# Patient Record
Sex: FEMALE | Race: WHITE | NOT HISPANIC OR LATINO | URBAN - METROPOLITAN AREA
[De-identification: names, ages, dates, MRNs, and addresses within clinical notes are randomized per-mention and may not be internally consistent; named-entity substitution may affect disease eponyms.]

---

## 2017-03-29 ENCOUNTER — OFFICE (OUTPATIENT)
Dept: URBAN - METROPOLITAN AREA CLINIC 75 | Facility: CLINIC | Age: 32
End: 2017-03-29

## 2017-03-29 VITALS
HEIGHT: 60 IN | DIASTOLIC BLOOD PRESSURE: 88 MMHG | SYSTOLIC BLOOD PRESSURE: 120 MMHG | WEIGHT: 180 LBS | HEART RATE: 72 BPM

## 2017-03-29 DIAGNOSIS — R11.0 NAUSEA: ICD-10-CM

## 2017-03-29 DIAGNOSIS — R93.3 ABNORMAL FINDINGS ON DIAGNOSTIC IMAGING OF OTHER PARTS OF DI: ICD-10-CM

## 2017-03-29 DIAGNOSIS — R10.13 EPIGASTRIC PAIN: ICD-10-CM

## 2017-03-29 DIAGNOSIS — R14.0 ABDOMINAL DISTENSION (GASEOUS): ICD-10-CM

## 2017-03-29 PROCEDURE — 99214 OFFICE O/P EST MOD 30 MIN: CPT | Performed by: INTERNAL MEDICINE

## 2017-03-29 RX ORDER — NITAZOXANIDE 500 MG/1
TABLET ORAL
Qty: 20 | Refills: 0 | Status: COMPLETED
Start: 2017-03-29 | End: 2017-08-31

## 2017-05-17 VITALS
SYSTOLIC BLOOD PRESSURE: 113 MMHG | RESPIRATION RATE: 23 BRPM | DIASTOLIC BLOOD PRESSURE: 66 MMHG | OXYGEN SATURATION: 100 % | HEART RATE: 98 BPM | HEART RATE: 120 BPM | OXYGEN SATURATION: 99 % | HEART RATE: 95 BPM | WEIGHT: 180 LBS | SYSTOLIC BLOOD PRESSURE: 137 MMHG | SYSTOLIC BLOOD PRESSURE: 128 MMHG | HEIGHT: 60 IN | OXYGEN SATURATION: 98 % | SYSTOLIC BLOOD PRESSURE: 139 MMHG | RESPIRATION RATE: 18 BRPM | DIASTOLIC BLOOD PRESSURE: 82 MMHG | HEART RATE: 96 BPM | DIASTOLIC BLOOD PRESSURE: 69 MMHG | HEART RATE: 104 BPM | DIASTOLIC BLOOD PRESSURE: 86 MMHG | SYSTOLIC BLOOD PRESSURE: 132 MMHG | HEART RATE: 106 BPM | HEART RATE: 86 BPM | HEART RATE: 102 BPM | SYSTOLIC BLOOD PRESSURE: 118 MMHG | HEART RATE: 93 BPM | RESPIRATION RATE: 22 BRPM | OXYGEN SATURATION: 97 % | RESPIRATION RATE: 16 BRPM | DIASTOLIC BLOOD PRESSURE: 87 MMHG | DIASTOLIC BLOOD PRESSURE: 89 MMHG | SYSTOLIC BLOOD PRESSURE: 110 MMHG | TEMPERATURE: 99.2 F | OXYGEN SATURATION: 94 % | HEART RATE: 97 BPM | SYSTOLIC BLOOD PRESSURE: 107 MMHG | DIASTOLIC BLOOD PRESSURE: 76 MMHG | RESPIRATION RATE: 20 BRPM | DIASTOLIC BLOOD PRESSURE: 78 MMHG | RESPIRATION RATE: 24 BRPM | SYSTOLIC BLOOD PRESSURE: 108 MMHG | SYSTOLIC BLOOD PRESSURE: 121 MMHG | TEMPERATURE: 98 F | DIASTOLIC BLOOD PRESSURE: 73 MMHG | SYSTOLIC BLOOD PRESSURE: 120 MMHG

## 2017-05-18 ENCOUNTER — AMBULATORY SURGICAL CENTER (OUTPATIENT)
Dept: URBAN - METROPOLITAN AREA SURGERY 17 | Facility: SURGERY | Age: 32
End: 2017-05-18
Payer: COMMERCIAL

## 2017-05-18 ENCOUNTER — OFFICE (OUTPATIENT)
Dept: URBAN - METROPOLITAN AREA CLINIC 64 | Facility: CLINIC | Age: 32
End: 2017-05-18
Payer: COMMERCIAL

## 2017-05-18 DIAGNOSIS — R14.0 ABDOMINAL DISTENSION (GASEOUS): ICD-10-CM

## 2017-05-18 DIAGNOSIS — R93.3 ABNORMAL FINDINGS ON DIAGNOSTIC IMAGING OF OTHER PARTS OF DI: ICD-10-CM

## 2017-05-18 DIAGNOSIS — R11.0 NAUSEA: ICD-10-CM

## 2017-05-18 DIAGNOSIS — K31.7 POLYP OF STOMACH AND DUODENUM: ICD-10-CM

## 2017-05-18 DIAGNOSIS — K21.0 GASTRO-ESOPHAGEAL REFLUX DISEASE WITH ESOPHAGITIS: ICD-10-CM

## 2017-05-18 DIAGNOSIS — R10.10 UPPER ABDOMINAL PAIN, UNSPECIFIED: ICD-10-CM

## 2017-05-18 DIAGNOSIS — K29.50 UNSPECIFIED CHRONIC GASTRITIS WITHOUT BLEEDING: ICD-10-CM

## 2017-05-18 DIAGNOSIS — R10.13 EPIGASTRIC PAIN: ICD-10-CM

## 2017-05-18 DIAGNOSIS — K29.70 GASTRITIS, UNSPECIFIED, WITHOUT BLEEDING: ICD-10-CM

## 2017-05-18 LAB
GI HISTOLOGY: A. SELECT: (no result)
GI HISTOLOGY: B. SELECT: (no result)
GI HISTOLOGY: C. UNSPECIFIED: (no result)
GI HISTOLOGY: D. UNSPECIFIED: (no result)
GI HISTOLOGY: PDF REPORT: (no result)

## 2017-05-18 PROCEDURE — 88305 TISSUE EXAM BY PATHOLOGIST: CPT | Performed by: INTERNAL MEDICINE

## 2017-05-18 PROCEDURE — 88342 IMHCHEM/IMCYTCHM 1ST ANTB: CPT | Performed by: INTERNAL MEDICINE

## 2017-05-18 PROCEDURE — 45378 DIAGNOSTIC COLONOSCOPY: CPT | Performed by: INTERNAL MEDICINE

## 2017-05-18 PROCEDURE — 43239 EGD BIOPSY SINGLE/MULTIPLE: CPT | Performed by: INTERNAL MEDICINE

## 2017-05-18 RX ADMIN — PROPOFOL 50 MG: 10 INJECTION, EMULSION INTRAVENOUS at 15:03

## 2017-05-18 RX ADMIN — LIDOCAINE HYDROCHLORIDE 25 MG: 10 INJECTION, SOLUTION EPIDURAL; INFILTRATION; INTRACAUDAL; PERINEURAL at 15:00

## 2017-05-18 RX ADMIN — PROPOFOL 50 MG: 10 INJECTION, EMULSION INTRAVENOUS at 15:06

## 2017-05-18 RX ADMIN — PROPOFOL 100 MG: 10 INJECTION, EMULSION INTRAVENOUS at 15:00

## 2017-05-18 RX ADMIN — PROPOFOL 50 MG: 10 INJECTION, EMULSION INTRAVENOUS at 15:07

## 2017-05-18 RX ADMIN — PROPOFOL 50 MG: 10 INJECTION, EMULSION INTRAVENOUS at 15:02

## 2017-05-18 RX ADMIN — PROPOFOL 50 MG: 10 INJECTION, EMULSION INTRAVENOUS at 15:13

## 2017-05-18 RX ADMIN — PROPOFOL 50 MG: 10 INJECTION, EMULSION INTRAVENOUS at 15:10

## 2017-05-18 RX ADMIN — PROPOFOL 50 MG: 10 INJECTION, EMULSION INTRAVENOUS at 15:18

## 2017-05-18 RX ADMIN — PROPOFOL 50 MG: 10 INJECTION, EMULSION INTRAVENOUS at 15:16

## 2017-08-31 ENCOUNTER — OFFICE (OUTPATIENT)
Dept: URBAN - METROPOLITAN AREA CLINIC 75 | Facility: CLINIC | Age: 32
End: 2017-08-31
Payer: COMMERCIAL

## 2017-08-31 VITALS — WEIGHT: 181 LBS | HEIGHT: 60 IN | SYSTOLIC BLOOD PRESSURE: 134 MMHG | DIASTOLIC BLOOD PRESSURE: 84 MMHG

## 2017-08-31 DIAGNOSIS — R93.3 ABNORMAL FINDINGS ON DIAGNOSTIC IMAGING OF OTHER PARTS OF DI: ICD-10-CM

## 2017-08-31 DIAGNOSIS — K58.9 IRRITABLE BOWEL SYNDROME WITHOUT DIARRHEA: ICD-10-CM

## 2017-08-31 DIAGNOSIS — R10.10 UPPER ABDOMINAL PAIN, UNSPECIFIED: ICD-10-CM

## 2017-08-31 PROCEDURE — 99213 OFFICE O/P EST LOW 20 MIN: CPT | Performed by: INTERNAL MEDICINE

## 2017-08-31 RX ORDER — HYOSCYAMINE SULFATE 0.12 MG/1
TABLET ORAL
Qty: 120 | Refills: 6 | Status: ACTIVE
Start: 2017-08-31

## 2018-05-16 ENCOUNTER — OFFICE VISIT (OUTPATIENT)
Dept: CARDIOLOGY | Facility: CLINIC | Age: 33
End: 2018-05-16

## 2018-05-16 VITALS
DIASTOLIC BLOOD PRESSURE: 76 MMHG | HEIGHT: 60 IN | SYSTOLIC BLOOD PRESSURE: 106 MMHG | WEIGHT: 176 LBS | HEART RATE: 82 BPM | BODY MASS INDEX: 34.55 KG/M2

## 2018-05-16 DIAGNOSIS — G90.A POTS (POSTURAL ORTHOSTATIC TACHYCARDIA SYNDROME): ICD-10-CM

## 2018-05-16 DIAGNOSIS — E78.5 HYPERLIPIDEMIA, UNSPECIFIED HYPERLIPIDEMIA TYPE: ICD-10-CM

## 2018-05-16 DIAGNOSIS — I10 ESSENTIAL HYPERTENSION: Primary | ICD-10-CM

## 2018-05-16 PROCEDURE — 93000 ELECTROCARDIOGRAM COMPLETE: CPT | Performed by: INTERNAL MEDICINE

## 2018-05-16 PROCEDURE — 99204 OFFICE O/P NEW MOD 45 MIN: CPT | Performed by: INTERNAL MEDICINE

## 2018-05-16 RX ORDER — AMLODIPINE BESYLATE 2.5 MG/1
2.5 TABLET ORAL DAILY
Qty: 90 TABLET | Refills: 3 | Status: SHIPPED | OUTPATIENT
Start: 2018-05-16 | End: 2018-11-20

## 2018-05-16 RX ORDER — DESONIDE 0.5 MG/ML
LOTION TOPICAL AS NEEDED
COMMUNITY
End: 2019-05-13

## 2018-05-16 RX ORDER — OMEPRAZOLE 20 MG/1
20 CAPSULE, DELAYED RELEASE ORAL AS NEEDED
COMMUNITY
End: 2018-07-09

## 2018-05-16 NOTE — PROGRESS NOTES
Date of Office Visit: 2018  Encounter Provider: Jazz Hardin MD  Place of Service: [facility] CARDIOLOGY  Patient Name: Marycarmen Gauthier  :1985      Patient ID:  Marycarmen Gauthier is a 33 y.o. female is here for          History of Present Illness    She had a tilt table study done 17 which showed inappropriate sinus tachycardia but no evidence of vasovagal syncope.  Tachycardia occurred after nitroglycerin.    She was seen at the [facility] 2018.  She went there for POTS.  In , she had premature rupture of membranes and had her son prematurely at 29 weeks.  He was in the  intensive care unit for 3 months.  After that, she felt like her nerves are shot.  She began having nausea and sweating.  She also started noticing episodes of anxiety, short windedness and tachycardia.  She was having difficulty eating caring for her son because when these episodes of dizziness and tachycardia, on, she has to lie down.  She's checked her blood pressure before when she's had the episodes and she could not even get a blood pressure on her blood pressure monitor.  He does have a history of fibromyalgia, hypermobile joints and posttraumatic stress disorder.  Her autonomic testing done at [facility] showed a normal QSART so no peripheral autonomic neuropathy.  Her tilt table study showed postural tachycardia but stable blood pressure.  They thought she had postural orthostatic tachycardia syndrome and recommended exercise and consideration of beta blockers.  In addition they recommended 3-5 g of sodium a day, to 2.5 L of fluid per day and sleeping with her head raised 6-10 inches as well as compression stockings avoiding alcohol and large meals.    She has had issues with her blood pressure being elevated.  She still has episodes of near syncope with her heart racing.  She particularly notices the heart racing at night.  She's not work since February  2018 due to severe fatigue and recurrent episodes of near syncope.  She has no lower extremity edema.  She does have short windedness activity.  She's trying to work with a  to increase her exercise tolerance.  She is not having chest pain or pressure.  She does have some mild nausea.  Her blood pressure still at times gets high.  She is on atenolol and hydrochlorothiazide currently for that.    She had an echocardiogram done 2017 which was normal.  She sees Dr. Lomas from neurology at [facility] for left arm numbness.    She does have joint hypermobility and sicca syndrome and sees Dr. Crook.  She has fibromyalgia.  She also has endometriosis.    She is  and has one child who was born premature.  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  She has occasional alcohol.  She has one cup of coffee per day.  There is no family history of premature cardiovascular disease.    Past Medical History:   Diagnosis Date   • Anxiety    • Asthma    • Autonomic dysfunction    • Chronic pain    • Chronic sinus infection    • Depression    • Dizziness    • Fatigue    • Fibromyalgia    • Flexor hallucis longus tendinitis 2013   • History of anemia    • Hypermobile joints    • Numbness and tingling in both hands    • Obesity    • Osteoarthritis    • Palpitations    • POTS (postural orthostatic tachycardia syndrome) 2017   • PROM (premature rupture of membranes)    • PTSD (post-traumatic stress disorder) 2014   • Snoring    • SOB (shortness of breath)    • Syncope and collapse          Past Surgical History:   Procedure Laterality Date   • ANKLE SURGERY Right     FHL tendon release   • BREAST SURGERY  10/15/2013    Reduction   • CARPAL TUNNEL RELEASE Right 2017    REVISE MEDIAN N/CARPAL TUNNEL SURG   •  SECTION     • ROTATOR CUFF REPAIR Right    • SINUS SURGERY     • WISDOM TOOTH EXTRACTION         Current Outpatient Prescriptions on File Prior  to Visit   Medication Sig Dispense Refill   • ATENOLOL PO Take 12.5 mg by mouth 2 (Two) Times a Day.     • Citalopram Hydrobromide (CELEXA PO) Take 60 mg by mouth Daily.     • DULoxetine HCl (CYMBALTA PO) Take 60 mg by mouth Daily.     • Fexofenadine HCl (ALLEGRA ALLERGY PO) Take 180 mg by mouth As Needed.     • fluticasone (FLONASE) 50 MCG/ACT nasal spray 2 sprays into each nostril Daily.     • Ibuprofen (ADVIL PO) Take 1 tablet by mouth Daily.     • Pregabalin (LYRICA PO) Take 75 mg by mouth 2 (Two) Times a Day.     • [DISCONTINUED] HYDROCHLOROTHIAZIDE PO Take 25 mg by mouth Daily.       No current facility-administered medications on file prior to visit.        Social History     Social History   • Marital status:      Spouse name: N/A   • Number of children: N/A   • Years of education: N/A     Occupational History   • Not on file.     Social History Main Topics   • Smoking status: Never Smoker   • Smokeless tobacco: Never Used      Comment: CAFFEINE USE   • Alcohol use 0.6 oz/week     1 Standard drinks or equivalent per week      Comment: occ   • Drug use: No   • Sexual activity: Yes     Partners: Male     Birth control/ protection: Condom, None     Other Topics Concern   • Not on file     Social History Narrative   • No narrative on file           Review of Systems   Constitution: Negative.   HENT: Negative for congestion.    Eyes: Negative for vision loss in left eye and vision loss in right eye.   Respiratory: Negative.  Negative for cough, hemoptysis, shortness of breath, sleep disturbances due to breathing, snoring, sputum production and wheezing.    Endocrine: Negative.    Hematologic/Lymphatic: Negative.    Skin: Negative for poor wound healing and rash.   Musculoskeletal: Negative for falls, gout, muscle cramps and myalgias.   Gastrointestinal: Negative for abdominal pain, diarrhea, dysphagia, hematemesis, melena, nausea and vomiting.   Neurological: Negative for excessive daytime sleepiness,  "dizziness, headaches, light-headedness, loss of balance, seizures and vertigo.   Psychiatric/Behavioral: Negative for depression and substance abuse. The patient is not nervous/anxious.        Procedures    ECG 12 Lead  Date/Time: 5/16/2018 12:07 PM  Performed by: THAI GIPSON  Authorized by: THAI GIPSON   Comparison: compared with previous ECG   Similar to previous ECG  Rhythm: sinus rhythm  Clinical impression: normal ECG                Objective:      Vitals:    05/16/18 1107 05/16/18 1111   BP: 110/72 106/76   BP Location: Right arm Left arm   Patient Position: Sitting Sitting   Pulse: 82    Weight: 79.8 kg (176 lb)    Height: 152.4 cm (60\")      Body mass index is 34.37 kg/m².    Physical Exam   Constitutional: She is oriented to person, place, and time. She appears well-developed and well-nourished. No distress.   HENT:   Head: Normocephalic and atraumatic.   Eyes: Conjunctivae are normal. No scleral icterus.   Neck: Neck supple. No JVD present. Carotid bruit is not present. No thyromegaly present.   Cardiovascular: Normal rate, regular rhythm, S1 normal, S2 normal, normal heart sounds and intact distal pulses.   No extrasystoles are present. PMI is not displaced.  Exam reveals no gallop.    No murmur heard.  Pulses:       Carotid pulses are 2+ on the right side, and 2+ on the left side.       Radial pulses are 2+ on the right side, and 2+ on the left side.        Dorsalis pedis pulses are 2+ on the right side, and 2+ on the left side.        Posterior tibial pulses are 2+ on the right side, and 2+ on the left side.   Pulmonary/Chest: Effort normal and breath sounds normal. No respiratory distress. She has no wheezes. She has no rhonchi. She has no rales. She exhibits no tenderness.   Abdominal: Soft. Bowel sounds are normal. She exhibits no distension, no abdominal bruit and no mass. There is no tenderness.   Musculoskeletal: She exhibits no edema or deformity.   Lymphadenopathy:     She has " no cervical adenopathy.   Neurological: She is alert and oriented to person, place, and time. No cranial nerve deficit.   Skin: Skin is warm and dry. No rash noted. She is not diaphoretic. No cyanosis. No pallor. Nails show no clubbing.   Psychiatric: She has a normal mood and affect. Judgment normal.   Vitals reviewed.      Lab Review:       Assessment:      Diagnosis Plan   1. Essential hypertension     2. Hyperlipidemia, unspecified hyperlipidemia type     3. POTS (postural orthostatic tachycardia syndrome)       1. POTS, consider corlanor.  Continue atenolol this time.  Stop HCTZ.  Use recumbent bike, pool therapy and yoga.  Continued to maintain hydration and salt intake.  Use lean protein sources and fresh fruits and vegetables.  Avoid carbohydrates.  Use cooling scars during the hot summer months as she is very sensitive to this.  2. Hypermobile joints.   3. Hypertension, try amlodipine 2.5mg daily.      Plan:       See back in 8 weeks.

## 2018-07-03 ENCOUNTER — OFFICE VISIT (OUTPATIENT)
Dept: CARDIOLOGY | Facility: CLINIC | Age: 33
End: 2018-07-03

## 2018-07-03 VITALS
DIASTOLIC BLOOD PRESSURE: 80 MMHG | BODY MASS INDEX: 33.02 KG/M2 | HEIGHT: 60 IN | SYSTOLIC BLOOD PRESSURE: 110 MMHG | WEIGHT: 168.2 LBS | HEART RATE: 95 BPM

## 2018-07-03 DIAGNOSIS — E78.5 HYPERLIPIDEMIA, UNSPECIFIED HYPERLIPIDEMIA TYPE: ICD-10-CM

## 2018-07-03 DIAGNOSIS — I10 ESSENTIAL HYPERTENSION: ICD-10-CM

## 2018-07-03 DIAGNOSIS — G90.A POTS (POSTURAL ORTHOSTATIC TACHYCARDIA SYNDROME): Primary | ICD-10-CM

## 2018-07-03 PROCEDURE — 99214 OFFICE O/P EST MOD 30 MIN: CPT | Performed by: INTERNAL MEDICINE

## 2018-07-03 PROCEDURE — 93000 ELECTROCARDIOGRAM COMPLETE: CPT | Performed by: INTERNAL MEDICINE

## 2018-07-03 NOTE — PROGRESS NOTES
Date of Office Visit: 2018  Encounter Provider: Jazz Hardin MD  Place of Service: [facility] CARDIOLOGY  Patient Name: Marycarmen Gauthier  :1985      Patient ID:  Marycarmen Gauthier is a 33 y.o. female is here for  followup for POTS.         History of Present Illness    She had a tilt table study done 17 which showed inappropriate sinus tachycardia but no evidence of vasovagal syncope.  Tachycardia occurred after nitroglycerin.     She was seen at the [facility] 2018.  She went there for POTS.  In , she had premature rupture of membranes and had her son prematurely at 29 weeks.  He was in the  intensive care unit for 3 months.  After that, she felt like her nerves are shot.  She began having nausea and sweating.  She also started noticing episodes of anxiety, short windedness and tachycardia.  She was having difficulty eating caring for her son because when these episodes of dizziness and tachycardia, on, she has to lie down.  She's checked her blood pressure before when she's had the episodes and she could not even get a blood pressure on her blood pressure monitor.  He does have a history of fibromyalgia, hypermobile joints and posttraumatic stress disorder.  Her autonomic testing done at [facility] showed a normal QSART so no peripheral autonomic neuropathy.  Her tilt table study showed postural tachycardia but stable blood pressure.  They thought she had postural orthostatic tachycardia syndrome and recommended exercise and consideration of beta blockers.  In addition they recommended 3-5 g of sodium a day, to 2.5 L of fluid per day and sleeping with her head raised 6-10 inches as well as compression stockings avoiding alcohol and large meals.       She had an echocardiogram done 2017 which was normal.  She sees Dr. Lomas from neurology at [facility] for left arm numbness.     She does have joint hypermobility and sicca  syndrome and sees Dr. Crook.  She has fibromyalgia.  She also has endometriosis.     She is  and has one child who was born premature.  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  She has occasional alcohol.  She has one cup of coffee per day.  There is no family history of premature cardiovascular disease.       At her first visit with me, I recommended discontinuation of hydrochlorothiazide.  I recommended exercise, hydration, discontinuation of carbohydrates, cooling scars and salt.  She is doing better with this.  She is not active baseline but her heart rate is better.  She's lost 10 pounds.  She's been exercising.  She's tolerating amlodipine for high blood pressure.    Past Medical History:   Diagnosis Date   • Anxiety    • Asthma    • Autonomic dysfunction    • Chronic pain    • Chronic sinus infection    • Depression    • Dizziness    • Essential hypertension    • Fatigue    • Fibromyalgia    • Flexor hallucis longus tendinitis 2013   • History of anemia    • Hyperlipidemia    • Hypermobile joints    • Numbness and tingling in both hands    • Obesity    • Osteoarthritis    • Palpitations    • POTS (postural orthostatic tachycardia syndrome) 2017   • PROM (premature rupture of membranes)    • PTSD (post-traumatic stress disorder) 2014   • Snoring    • SOB (shortness of breath)    • Syncope and collapse          Past Surgical History:   Procedure Laterality Date   • ANKLE SURGERY Right     FHL tendon release   • BREAST SURGERY  10/15/2013    Reduction   • CARPAL TUNNEL RELEASE Right 2017    REVISE MEDIAN N/CARPAL TUNNEL SURG   •  SECTION     • ROTATOR CUFF REPAIR Right    • SINUS SURGERY     • WISDOM TOOTH EXTRACTION         Current Outpatient Prescriptions on File Prior to Visit   Medication Sig Dispense Refill   • ALBUTEROL SULFATE HFA IN Inhale.     • amLODIPine (NORVASC) 2.5 MG tablet Take 1 tablet by mouth Daily. 90 tablet 3   • ATENOLOL  PO Take 12.5 mg by mouth 2 (Two) Times a Day.     • Citalopram Hydrobromide (CELEXA PO) Take 60 mg by mouth Daily.     • desonide (DESOWEN) 0.05 % lotion Apply  topically As Needed.     • DULoxetine HCl (CYMBALTA PO) Take 60 mg by mouth Daily.     • Fexofenadine HCl (ALLEGRA ALLERGY PO) Take 180 mg by mouth As Needed.     • fluticasone (FLONASE) 50 MCG/ACT nasal spray 2 sprays into each nostril Daily.     • Ibuprofen (ADVIL PO) Take 1 tablet by mouth Daily.     • Multiple Vitamin (MULTI-VITAMIN DAILY PO) Take 1 tablet by mouth Daily.     • omeprazole (priLOSEC) 20 MG capsule Take 20 mg by mouth As Needed.     • Pregabalin (LYRICA PO) Take 75 mg by mouth 2 (Two) Times a Day.     • Probiotic Product (PROBIOTIC PO) Take 1 tablet by mouth Daily.       No current facility-administered medications on file prior to visit.        Social History     Social History   • Marital status:      Spouse name: N/A   • Number of children: N/A   • Years of education: N/A     Occupational History   • Not on file.     Social History Main Topics   • Smoking status: Never Smoker   • Smokeless tobacco: Never Used      Comment: CAFFEINE USE   • Alcohol use 0.6 oz/week     1 Standard drinks or equivalent per week      Comment: occ   • Drug use: No   • Sexual activity: Yes     Partners: Male     Birth control/ protection: Condom, None     Other Topics Concern   • Not on file     Social History Narrative   • No narrative on file           Review of Systems   Constitution: Negative.   HENT: Negative for congestion.    Eyes: Negative for vision loss in left eye and vision loss in right eye.   Respiratory: Negative.  Negative for cough, hemoptysis, shortness of breath, sleep disturbances due to breathing, snoring, sputum production and wheezing.    Endocrine: Negative.    Hematologic/Lymphatic: Negative.    Skin: Negative for poor wound healing and rash.   Musculoskeletal: Negative for falls, gout, muscle cramps and myalgias.  "  Gastrointestinal: Negative for abdominal pain, diarrhea, dysphagia, hematemesis, melena, nausea and vomiting.   Neurological: Negative for excessive daytime sleepiness, dizziness, headaches, light-headedness, loss of balance, seizures and vertigo.   Psychiatric/Behavioral: Negative for depression and substance abuse. The patient is not nervous/anxious.        Procedures    ECG 12 Lead  Date/Time: 7/3/2018 9:51 AM  Performed by: THAI GIPSON  Authorized by: THAI GIPSON   Comparison: compared with previous ECG   Similar to previous ECG  Rhythm: sinus rhythm  Clinical impression: normal ECG                Objective:      Vitals:    07/03/18 0936   BP: 110/80   BP Location: Left arm   Patient Position: Sitting   Pulse: 95   Weight: 76.3 kg (168 lb 3.2 oz)   Height: 152.4 cm (60\")     Body mass index is 32.85 kg/m².    Physical Exam   Constitutional: She is oriented to person, place, and time. She appears well-developed and well-nourished. No distress.   HENT:   Head: Normocephalic and atraumatic.   Eyes: Conjunctivae are normal. No scleral icterus.   Neck: Neck supple. No JVD present. Carotid bruit is not present. No thyromegaly present.   Cardiovascular: Normal rate, regular rhythm, S1 normal, S2 normal, normal heart sounds and intact distal pulses.   No extrasystoles are present. PMI is not displaced.  Exam reveals no gallop.    No murmur heard.  Pulses:       Carotid pulses are 2+ on the right side, and 2+ on the left side.       Radial pulses are 2+ on the right side, and 2+ on the left side.        Dorsalis pedis pulses are 2+ on the right side, and 2+ on the left side.        Posterior tibial pulses are 2+ on the right side, and 2+ on the left side.   Pulmonary/Chest: Effort normal and breath sounds normal. No respiratory distress. She has no wheezes. She has no rhonchi. She has no rales. She exhibits no tenderness.   Abdominal: Soft. Bowel sounds are normal. She exhibits no distension, no " abdominal bruit and no mass. There is no tenderness.   Musculoskeletal: She exhibits no edema or deformity.   Lymphadenopathy:     She has no cervical adenopathy.   Neurological: She is alert and oriented to person, place, and time. No cranial nerve deficit.   Skin: Skin is warm and dry. No rash noted. She is not diaphoretic. No cyanosis. No pallor. Nails show no clubbing.   Psychiatric: She has a normal mood and affect. Judgment normal.   Vitals reviewed.      Lab Review:       Assessment:      Diagnosis Plan   1. POTS (postural orthostatic tachycardia syndrome)     2. Essential hypertension     3. Hyperlipidemia, unspecified hyperlipidemia type          1. POTS,  Stop atenolol this time.  Using recumbent bike, pool therapy and yoga.  Continued to maintain hydration and salt intake.  Use lean protein sources and fresh fruits and vegetables.  Avoid carbohydrates.  Use cooling scars during the hot summer months as she is very sensitive to this.  2. Hypermobile joints.   3. Hypertension,  amlodipine 2.5mg daily. Well controlled.      Plan:       See back in 4 months.

## 2018-07-09 ENCOUNTER — PATIENT MESSAGE (OUTPATIENT)
Dept: INTERNAL MEDICINE | Facility: CLINIC | Age: 33
End: 2018-07-09

## 2018-07-09 ENCOUNTER — OFFICE VISIT (OUTPATIENT)
Dept: INTERNAL MEDICINE | Facility: CLINIC | Age: 33
End: 2018-07-09

## 2018-07-09 VITALS
HEART RATE: 115 BPM | WEIGHT: 167 LBS | HEIGHT: 60 IN | OXYGEN SATURATION: 99 % | SYSTOLIC BLOOD PRESSURE: 118 MMHG | BODY MASS INDEX: 32.79 KG/M2 | DIASTOLIC BLOOD PRESSURE: 78 MMHG

## 2018-07-09 DIAGNOSIS — R53.82 CHRONIC FATIGUE: Chronic | ICD-10-CM

## 2018-07-09 DIAGNOSIS — I10 ESSENTIAL HYPERTENSION: Chronic | ICD-10-CM

## 2018-07-09 DIAGNOSIS — E78.01 FAMILIAL HYPERCHOLESTEROLEMIA: Chronic | ICD-10-CM

## 2018-07-09 DIAGNOSIS — G90.A POTS (POSTURAL ORTHOSTATIC TACHYCARDIA SYNDROME): Primary | Chronic | ICD-10-CM

## 2018-07-09 DIAGNOSIS — M35.7 HYPERMOBILITY SYNDROME: Chronic | ICD-10-CM

## 2018-07-09 PROBLEM — R00.0 TACHYCARDIA: Status: ACTIVE | Noted: 2017-09-07

## 2018-07-09 PROBLEM — R11.0 NAUSEA: Status: ACTIVE | Noted: 2017-01-01

## 2018-07-09 PROBLEM — R06.09 DOE (DYSPNEA ON EXERTION): Status: ACTIVE | Noted: 2017-08-07

## 2018-07-09 PROBLEM — D80.2 IGA DEFICIENCY, SELECTIVE (HCC): Status: ACTIVE | Noted: 2017-03-09

## 2018-07-09 PROBLEM — G62.9 SMALL FIBER NEUROPATHY: Status: ACTIVE | Noted: 2017-02-13

## 2018-07-09 PROBLEM — J30.1 CHRONIC SEASONAL ALLERGIC RHINITIS DUE TO POLLEN: Status: ACTIVE | Noted: 2017-11-16

## 2018-07-09 PROBLEM — M54.50 LBP (LOW BACK PAIN): Status: ACTIVE | Noted: 2018-07-09

## 2018-07-09 PROBLEM — N20.0 NEPHROLITHIASIS: Status: ACTIVE | Noted: 2017-08-07

## 2018-07-09 PROBLEM — R00.2 HEART PALPITATIONS: Status: ACTIVE | Noted: 2017-08-07

## 2018-07-09 PROBLEM — J32.1 CHRONIC FRONTAL SINUSITIS: Status: ACTIVE | Noted: 2017-11-16

## 2018-07-09 PROCEDURE — 99214 OFFICE O/P EST MOD 30 MIN: CPT | Performed by: INTERNAL MEDICINE

## 2018-07-09 RX ORDER — NITROFURANTOIN 25; 75 MG/1; MG/1
CAPSULE ORAL
COMMUNITY
Start: 2018-07-05 | End: 2018-08-27

## 2018-07-09 NOTE — TELEPHONE ENCOUNTER
From: Marycarmen Gauthier  To: Gene Diaz MD  Sent: 7/9/2018 9:19 AM EDT  Subject: Visit Follow-Up Question    Dr. Hardin didn’t complete paperwork all the way and wrote to see notes but she doesn’t reference any limitations or disabilities. Dr. Diaz and I spoke this am he said he would complete for me to help with my appeal to [employer]. Thanks   ----- Message -----  From: Denice PROCTOR  Sent: 7/9/2018 9:13 AM EDT  To: Marycarmen Gauthier  Subject: RE: Visit Follow-Up Question  Marycarmen,    I will talk to Dr. Diaz but Dr. Hardin is involved and completed paperwork as well. It should truly come from her anyway for POTS. Will check with Dr. Diaz.     Thank you,    Whit     ----- Message -----   From: Marycarmen Gauthier   Sent: 7/9/2018 8:45 AM EDT   To: Gene Diaz MD  Subject: Visit Follow-Up Question    Dr. CRAIG,   I have been out on medical leave per my ARNP (Primary Care) since 2/9/18. [employer]/Short term disability has denied my claim based off lack of documentation by a MD “specifically listing my limitations and restrictions.” I am hopeful you can help me with a note listing my limitations/restrictions due to POTS and filling out this form. (2 page form attached)  Thanks for your kindness and help,   Marycarmen   [phone]  [email or web address]

## 2018-07-09 NOTE — TELEPHONE ENCOUNTER
From: Marycarmen Gauthier  To: Gene Diaz MD  Sent: 7/9/2018 8:45 AM EDT  Subject: Visit Follow-Up Question    Dr. CRAIG,   I have been out on medical leave per my ARNP (Primary Care) since 2/9/18. [employer]/Short term disability has denied my claim based off lack of documentation by a MD “specifically listing my limitations and restrictions.” I am hopeful you can help me with a note listing my limitations/restrictions due to POTS and filling out this form. (2 page form attached)  Thanks for your kindness and help,   Marycarmen   [phone]  [email or web address]

## 2018-07-09 NOTE — PROGRESS NOTES
Subjective   Marycarmen Gauthier is a 33 y.o. female.     History of Present Illness     The following portions of the patient's history were reviewed and updated as appropriate: allergies, current medications, past family history, past medical history, past social history, past surgical history and problem list.  34 yo/f with a chief complaint of profound fatigue, myalgias, episodic blood pressure shifts, palpitations, HTN, Hyperlipidemia, depression here for follow up. She has been evaluated at the [facility] and currently is working with her cardiologist and her Rheumatologist. She remains unable to carry on AODL. She has tried multiple theraputic approaches with mixed results. She remains unable to return to work at this time.  Review of Systems   Constitutional: Positive for fatigue.   HENT: Negative.    Eyes: Negative.    Respiratory: Positive for shortness of breath.    Cardiovascular: Positive for palpitations.   Gastrointestinal: Negative.    Endocrine: Negative.    Genitourinary: Negative.    Musculoskeletal: Positive for arthralgias and myalgias.   Skin: Negative.    Allergic/Immunologic: Negative.    Neurological: Negative.    Hematological: Negative.    Psychiatric/Behavioral: Positive for dysphoric mood.       Objective   Physical Exam   Constitutional: She is oriented to person, place, and time. She appears well-developed and well-nourished.   HENT:   Head: Normocephalic and atraumatic.   Eyes: EOM are normal. Pupils are equal, round, and reactive to light.   Neck: Normal range of motion.   Cardiovascular: Normal rate, regular rhythm and normal heart sounds.    Pulmonary/Chest: Effort normal and breath sounds normal.   Abdominal: Soft. Bowel sounds are normal.   Musculoskeletal:   Hyper mobility of her joints   Neurological: She is alert and oriented to person, place, and time.   Skin: Skin is warm and dry.   Psychiatric: She has a normal mood and affect. Her behavior is normal. Judgment and  thought content normal.   Nursing note and vitals reviewed.        Assessment/Plan   Marycarmen was seen today for establish care.    Diagnoses and all orders for this visit:    POTS (postural orthostatic tachycardia syndrome)  Comments:  working with a cardiologist    Essential hypertension  Comments:  well controlled at present    Familial hypercholesterolemia  Comments:  diet and exercise controlled    Hypermobility syndrome  Comments:  currently working with a Rheumatologist    Chronic fatigue  Comments:  cannot function at a normal level for her AODL

## 2018-08-02 ENCOUNTER — TRANSCRIBE ORDERS (OUTPATIENT)
Dept: ADMINISTRATIVE | Facility: HOSPITAL | Age: 33
End: 2018-08-02

## 2018-08-02 DIAGNOSIS — R16.0 LIVER MASS: Primary | ICD-10-CM

## 2018-08-07 ENCOUNTER — TELEPHONE (OUTPATIENT)
Dept: INTERNAL MEDICINE | Facility: CLINIC | Age: 33
End: 2018-08-07

## 2018-08-07 NOTE — TELEPHONE ENCOUNTER
Need patient to come in Thursday over the noon hour (not an appointment), to go over the form with me before I sign off on it. I want to make sure that all the information relayed on the form is correct. If she can't come in on Thursday, I can do a phone conference with her Friday morning.

## 2018-08-07 NOTE — TELEPHONE ENCOUNTER
----- Message from Marjan Magdaleno sent at 8/7/2018 12:51 PM EDT -----  Contact: pt - Dr Diaz's pt - RE: FAMLA & disability forms  Pt calling and would like a return call regarding pt's FMLA & Disability paper work. Could you please call pt to discuss if forms were rec'd and if completed? Pt would like to inform emplyer of status. Please advise Thanks      Pt # [phone]

## 2018-08-08 ENCOUNTER — TELEPHONE (OUTPATIENT)
Dept: INTERNAL MEDICINE | Facility: CLINIC | Age: 33
End: 2018-08-08

## 2018-08-08 NOTE — TELEPHONE ENCOUNTER
Left message informing pt of comments.  Advised pt to contact the office to see which day works best for her.

## 2018-08-08 NOTE — TELEPHONE ENCOUNTER
----- Message from Windy Garcia sent at 8/8/2018  2:22 PM EDT -----  Contact: Pt  Pt would like to do a phone conference call Friday.  She will be available any time and will have her phone on her.      Pt# [phone]

## 2018-08-10 ENCOUNTER — APPOINTMENT (OUTPATIENT)
Dept: MRI IMAGING | Facility: HOSPITAL | Age: 33
End: 2018-08-10
Attending: UROLOGY

## 2018-08-27 ENCOUNTER — OFFICE VISIT (OUTPATIENT)
Dept: INTERNAL MEDICINE | Facility: CLINIC | Age: 33
End: 2018-08-27

## 2018-08-27 VITALS
DIASTOLIC BLOOD PRESSURE: 78 MMHG | WEIGHT: 167 LBS | SYSTOLIC BLOOD PRESSURE: 110 MMHG | HEIGHT: 60 IN | BODY MASS INDEX: 32.79 KG/M2

## 2018-08-27 DIAGNOSIS — R20.0 BILATERAL NUMBNESS AND TINGLING OF ARMS AND LEGS: Primary | ICD-10-CM

## 2018-08-27 DIAGNOSIS — R20.2 BILATERAL NUMBNESS AND TINGLING OF ARMS AND LEGS: Primary | ICD-10-CM

## 2018-08-27 DIAGNOSIS — M54.2 NECK PAIN: ICD-10-CM

## 2018-08-27 DIAGNOSIS — F43.10 PTSD (POST-TRAUMATIC STRESS DISORDER): Chronic | ICD-10-CM

## 2018-08-27 DIAGNOSIS — G90.A POTS (POSTURAL ORTHOSTATIC TACHYCARDIA SYNDROME): ICD-10-CM

## 2018-08-27 PROCEDURE — 99214 OFFICE O/P EST MOD 30 MIN: CPT | Performed by: INTERNAL MEDICINE

## 2018-08-27 RX ORDER — MINOCYCLINE HYDROCHLORIDE 100 MG/1
CAPSULE ORAL
COMMUNITY
Start: 2018-07-26 | End: 2018-11-20

## 2018-08-27 RX ORDER — DULOXETIN HYDROCHLORIDE 60 MG/1
CAPSULE, DELAYED RELEASE ORAL
COMMUNITY
Start: 2018-08-13 | End: 2018-11-20 | Stop reason: SDUPTHER

## 2018-08-27 RX ORDER — COBAMAMIDE 100 %
POWDER (GRAM) MISCELLANEOUS
COMMUNITY
Start: 2018-05-29 | End: 2020-02-24

## 2018-08-27 RX ORDER — OXYCODONE HYDROCHLORIDE AND ACETAMINOPHEN 5; 325 MG/1; MG/1
1 TABLET ORAL EVERY 8 HOURS PRN
Qty: 90 TABLET | Refills: 0 | Status: SHIPPED | OUTPATIENT
Start: 2018-08-27 | End: 2019-06-25

## 2018-08-27 NOTE — PROGRESS NOTES
Subjective   Marycarmen Gauthier is a 33 y.o. female.     History of Present Illness     The following portions of the patient's history were reviewed and updated as appropriate: allergies, current medications, past family history, past medical history, past social history, past surgical history and problem list.  32 yo/f with a chief complaint of worsening neck pain secondary to CDD, bilateral arm numbness, PTSD, POTS here for follow up. She is working with a neurosurgeon and I recommended PT as well. The pain is bad enough to interfere with her daily functioning.  Review of Systems   Constitutional: Positive for fatigue.   HENT: Positive for congestion.    Eyes: Positive for visual disturbance.   Respiratory: Negative.    Cardiovascular: Negative.    Gastrointestinal: Negative.    Endocrine: Positive for cold intolerance.   Genitourinary: Negative.    Musculoskeletal: Positive for arthralgias, back pain and neck pain.   Skin: Negative.    Allergic/Immunologic: Negative.    Neurological: Negative.    Hematological: Negative.    Psychiatric/Behavioral: Negative.        Objective   Physical Exam   Constitutional: She is oriented to person, place, and time. She appears well-developed and well-nourished.   HENT:   Head: Normocephalic and atraumatic.   Eyes: Pupils are equal, round, and reactive to light. EOM are normal.   Neck:   Reduced ROM with a step off at the cervical thoracic juncture   Cardiovascular: Normal rate, regular rhythm and normal heart sounds.    Pulmonary/Chest: Effort normal and breath sounds normal.   Abdominal: Soft. Bowel sounds are normal.   Musculoskeletal:   Chronic neck and back pain   Neurological: She is alert and oriented to person, place, and time.   Skin: Skin is warm and dry.   Psychiatric: She has a normal mood and affect. Her behavior is normal. Judgment and thought content normal.   Nursing note and vitals reviewed.        Assessment/Plan   Marycarmen was seen today for neck  pain.    Diagnoses and all orders for this visit:    Bilateral numbness and tingling of arms and legs  Comments:  has documented cervical disc disease per MRI    PTSD (post-traumatic stress disorder)  Comments:  stable on her current medications    Neck pain  Comments:  return to neurosurgeon  and will treat with percocet 5-325 mg TID  Orders:  -     Ambulatory Referral to Physical Therapy    POTS (postural orthostatic tachycardia syndrome)  Comments:  manageable with lifestyle changes    Other orders  -     oxyCODONE-acetaminophen (PERCOCET) 5-325 MG per tablet; Take 1 tablet by mouth Every 8 (Eight) Hours As Needed for Severe Pain .

## 2018-10-01 ENCOUNTER — TELEPHONE (OUTPATIENT)
Dept: INTERNAL MEDICINE | Facility: CLINIC | Age: 33
End: 2018-10-01

## 2018-11-20 ENCOUNTER — OFFICE VISIT (OUTPATIENT)
Dept: CARDIOLOGY | Facility: CLINIC | Age: 33
End: 2018-11-20

## 2018-11-20 VITALS
DIASTOLIC BLOOD PRESSURE: 78 MMHG | SYSTOLIC BLOOD PRESSURE: 120 MMHG | WEIGHT: 154.6 LBS | BODY MASS INDEX: 30.35 KG/M2 | HEIGHT: 60 IN | HEART RATE: 92 BPM

## 2018-11-20 DIAGNOSIS — I10 ESSENTIAL HYPERTENSION: ICD-10-CM

## 2018-11-20 DIAGNOSIS — R55 SYNCOPE AND COLLAPSE: ICD-10-CM

## 2018-11-20 DIAGNOSIS — G90.A POTS (POSTURAL ORTHOSTATIC TACHYCARDIA SYNDROME): Primary | ICD-10-CM

## 2018-11-20 PROCEDURE — 99214 OFFICE O/P EST MOD 30 MIN: CPT | Performed by: INTERNAL MEDICINE

## 2018-11-20 PROCEDURE — 93000 ELECTROCARDIOGRAM COMPLETE: CPT | Performed by: INTERNAL MEDICINE

## 2018-11-20 RX ORDER — DULOXETIN HYDROCHLORIDE 60 MG/1
1 CAPSULE, DELAYED RELEASE ORAL DAILY
COMMUNITY
Start: 2018-02-26 | End: 2019-05-13

## 2018-11-20 NOTE — PROGRESS NOTES
Date of Office Visit: 2018  Encounter Provider: Jazz Hardin MD  Place of Service: [facility] CARDIOLOGY  Patient Name: Marycarmen Gauthier  :1985      Patient ID:  Marycarmen Gauthier is a 33 y.o. female is here for  followup for         History of Present Illness    She had a tilt table study done 17 which showed inappropriate sinus tachycardia but no evidence of vasovagal syncope.  Tachycardia occurred after nitroglycerin.     She was seen at the [facility] 2018.  She went there for POTS.  In , she had premature rupture of membranes and had her son prematurely at 29 weeks.  He was in the  intensive care unit for 3 months.  After that, she felt like her nerves are shot.  She began having nausea and sweating.  She also started noticing episodes of anxiety, short windedness and tachycardia.  She was having difficulty eating caring for her son because when these episodes of dizziness and tachycardia, on, she has to lie down.  She's checked her blood pressure before when she's had the episodes and she could not even get a blood pressure on her blood pressure monitor.  He does have a history of fibromyalgia, hypermobile joints and posttraumatic stress disorder.  Her autonomic testing done at [facility] showed a normal QSART so no peripheral autonomic neuropathy.  Her tilt table study showed postural tachycardia but stable blood pressure.  They thought she had postural orthostatic tachycardia syndrome and recommended exercise and consideration of beta blockers.  In addition they recommended 3-5 g of sodium a day, to 2.5 L of fluid per day and sleeping with her head raised 6-10 inches as well as compression stockings avoiding alcohol and large meals.        She had an echocardiogram done 2017 which was normal.  She sees Dr. Lomas from neurology at [facility] for left arm numbness.     She does have joint hypermobility and sicca syndrome  and sees Dr. Crook.  She has fibromyalgia.  She also has endometriosis.     She is  and has one child who was born premature.  He is hyperactive.  She is an RN.  She doesn't smoke.  She has occasional alcohol.  She has one cup of coffee per day.  There is no family history of premature cardiovascular disease.    She's been diagnosed with lyme disease.  She is seeing occupational therapy appointment today as well as a physical therapist.  She is doing recumbent bike exercise.  She is not having as much tachycardia.  She's had no syncope.  She has days where she has severe fatigue.  Her blood pressure has been well-controlled.  She had a hysterectomy with Dr. Mason 2018.    Past Medical History:   Diagnosis Date   • Anxiety    • Asthma    • Autonomic dysfunction    • Chronic pain    • Chronic sinus infection    • Depression    • Dizziness    • Essential hypertension    • Fatigue    • Fibromyalgia    • Flexor hallucis longus tendinitis 2013   • History of anemia    • Hyperlipidemia    • Hypermobile joints    • Numbness and tingling in both hands    • Obesity    • Osteoarthritis    • Palpitations    • POTS (postural orthostatic tachycardia syndrome) 2017   • PROM (premature rupture of membranes)    • PTSD (post-traumatic stress disorder) 2014   • Snoring    • SOB (shortness of breath)    • Syncope and collapse          Past Surgical History:   Procedure Laterality Date   • ANKLE SURGERY Right     FHL tendon release   • BREAST SURGERY  10/15/2013    Reduction   • CARPAL TUNNEL RELEASE Right 2017    REVISE MEDIAN N/CARPAL TUNNEL SURG   •  SECTION     • ROTATOR CUFF REPAIR Right    • SINUS SURGERY     • WISDOM TOOTH EXTRACTION         Current Outpatient Medications on File Prior to Visit   Medication Sig Dispense Refill   • 7-Keto DHEA powder      • Acetylcysteine ( PO)      • ALBUTEROL SULFATE HFA IN Inhale.     • amLODIPine (NORVASC) 2.5 MG tablet Take 1  tablet by mouth Daily. 90 tablet 3   • ASHWAGANDHA PO Take 450 mg by mouth Daily.     • Charcoal 260 MG capsule      • Cholecalciferol (VITAMIN D-3) 1000 units capsule Take 1,000 Units by mouth Daily.     • Cobamamide (ADENOSYLCOBALAMIN) powder      • desonide (DESOWEN) 0.05 % lotion Apply  topically As Needed.     • DULoxetine (CYMBALTA) 60 MG capsule Take 1 capsule by mouth Daily.     • Fexofenadine HCl (ALLEGRA ALLERGY PO) Take 180 mg by mouth As Needed.     • Ibuprofen (ADVIL PO) Take 1 tablet by mouth Daily.     • LITHIUM PO Take 5 mg by mouth Daily.     • LYRICA 75 MG capsule      • Multiple Vitamin (MULTI-VITAMIN DAILY PO) Take 1 tablet by mouth Daily.     • Naltrexone HCl powder      • Omega-3 Fatty Acids (OMEGA-3 FISH OIL PO) Take 1 tablet by mouth Daily.     • oxyCODONE-acetaminophen (PERCOCET) 5-325 MG per tablet Take 1 tablet by mouth Every 8 (Eight) Hours As Needed for Severe Pain . 90 tablet 0   • Pregabalin (LYRICA PO) Take 75 mg by mouth 2 (Two) Times a Day.     • Probiotic Product (PROBIOTIC-10 ULTIMATE PO)      • Semaglutide (OZEMPIC) 1 MG/DOSE solution pen-injector      • Unable to find      • Unable to find      • Unable to find      • Unable to find      • Unable to find   1   • [DISCONTINUED] DULoxetine (CYMBALTA) 60 MG capsule      • [DISCONTINUED] DULoxetine HCl (CYMBALTA PO) Take 60 mg by mouth Daily.     • [DISCONTINUED] minocycline (MINOCIN,DYNACIN) 100 MG capsule      • [DISCONTINUED] Minocycline HCl 100 MG reconstituted solution      • [DISCONTINUED] TINIDAZOLE PO        No current facility-administered medications on file prior to visit.        Social History     Socioeconomic History   • Marital status:      Spouse name: Not on file   • Number of children: Not on file   • Years of education: Not on file   • Highest education level: Not on file   Social Needs   • Financial resource strain: Not on file   • Food insecurity - worry: Not on file   • Food insecurity - inability: Not  "on file   • Transportation needs - medical: Not on file   • Transportation needs - non-medical: Not on file   Occupational History   • Not on file   Tobacco Use   • Smoking status: Never Smoker   • Smokeless tobacco: Never Used   • Tobacco comment: CAFFEINE USE   Substance and Sexual Activity   • Alcohol use: Yes     Alcohol/week: 0.6 oz     Types: 1 Standard drinks or equivalent per week     Comment: occ   • Drug use: No   • Sexual activity: Yes     Partners: Male     Birth control/protection: Condom, None   Other Topics Concern   • Not on file   Social History Narrative   • Not on file           Review of Systems   Constitution: Negative.   HENT: Negative for congestion.    Eyes: Negative for vision loss in left eye and vision loss in right eye.   Respiratory: Negative.  Negative for cough, hemoptysis, shortness of breath, sleep disturbances due to breathing, snoring, sputum production and wheezing.    Endocrine: Negative.    Hematologic/Lymphatic: Negative.    Skin: Negative for poor wound healing and rash.   Musculoskeletal: Negative for falls, gout, muscle cramps and myalgias.   Gastrointestinal: Negative for abdominal pain, diarrhea, dysphagia, hematemesis, melena, nausea and vomiting.   Neurological: Negative for excessive daytime sleepiness, dizziness, headaches, light-headedness, loss of balance, seizures and vertigo.   Psychiatric/Behavioral: Negative for depression and substance abuse. The patient is not nervous/anxious.        Procedures    ECG 12 Lead  Date/Time: 11/20/2018 3:39 PM  Performed by: Jazz Hardin MD  Authorized by: Jazz Hardin MD   Comparison: compared with previous ECG   Similar to previous ECG  Rhythm: sinus rhythm  Clinical impression: normal ECG                Objective:      Vitals:    11/20/18 1458   BP: 120/78   BP Location: Right arm   Patient Position: Sitting   Pulse: 92   Weight: 70.1 kg (154 lb 9.6 oz)   Height: 152.4 cm (60\")     Body mass index is 30.19 " kg/m².    Physical Exam   Constitutional: She is oriented to person, place, and time. She appears well-developed and well-nourished. No distress.   HENT:   Head: Normocephalic and atraumatic.   Eyes: Conjunctivae are normal. No scleral icterus.   Neck: Neck supple. No JVD present. Carotid bruit is not present. No thyromegaly present.   Cardiovascular: Normal rate, regular rhythm, S1 normal, S2 normal, normal heart sounds and intact distal pulses.  No extrasystoles are present. PMI is not displaced. Exam reveals no gallop.   No murmur heard.  Pulses:       Carotid pulses are 2+ on the right side, and 2+ on the left side.       Radial pulses are 2+ on the right side, and 2+ on the left side.        Dorsalis pedis pulses are 2+ on the right side, and 2+ on the left side.        Posterior tibial pulses are 2+ on the right side, and 2+ on the left side.   Pulmonary/Chest: Effort normal and breath sounds normal. No respiratory distress. She has no wheezes. She has no rhonchi. She has no rales. She exhibits no tenderness.   Abdominal: Soft. Bowel sounds are normal. She exhibits no distension, no abdominal bruit and no mass. There is no tenderness.   Musculoskeletal: She exhibits no edema or deformity.   Lymphadenopathy:     She has no cervical adenopathy.   Neurological: She is alert and oriented to person, place, and time. No cranial nerve deficit.   Skin: Skin is warm and dry. No rash noted. She is not diaphoretic. No cyanosis. No pallor. Nails show no clubbing.   Psychiatric: She has a normal mood and affect. Judgment normal.   Vitals reviewed.      Lab Review:       Assessment:      Diagnosis Plan   1. POTS (postural orthostatic tachycardia syndrome)     2. Essential hypertension     3. Syncope and collapse       1. POTS,  Stop atenolol this time.  Using recumbent bike, pool therapy and yoga.  Continued to maintain hydration and salt intake.  Use lean protein sources and fresh fruits and vegetables.  Avoid  carbohydrates.  Use cooling scarves during the hot summer months as she is very sensitive to this.  2. Hypermobile joints.   3. Hypertension,  Well controlled.   4. Lyme disease, treated by Dr. Wesley.      Plan:       Stop amlodipine, watch BP at home and f/u with Francy in 6 months.  BP target is <120/80.

## 2018-12-27 ENCOUNTER — OFFICE VISIT (OUTPATIENT)
Dept: INTERNAL MEDICINE | Facility: CLINIC | Age: 33
End: 2018-12-27

## 2018-12-27 VITALS
SYSTOLIC BLOOD PRESSURE: 110 MMHG | WEIGHT: 154 LBS | DIASTOLIC BLOOD PRESSURE: 78 MMHG | HEIGHT: 60 IN | BODY MASS INDEX: 30.23 KG/M2

## 2018-12-27 DIAGNOSIS — D72.829 LEUKOCYTOSIS, UNSPECIFIED TYPE: ICD-10-CM

## 2018-12-27 DIAGNOSIS — Z90.710 H/O ABDOMINAL HYSTERECTOMY: Chronic | ICD-10-CM

## 2018-12-27 DIAGNOSIS — G90.A POTS (POSTURAL ORTHOSTATIC TACHYCARDIA SYNDROME): ICD-10-CM

## 2018-12-27 DIAGNOSIS — I10 ESSENTIAL HYPERTENSION: Primary | Chronic | ICD-10-CM

## 2018-12-27 PROBLEM — M50.30 DDD (DEGENERATIVE DISC DISEASE), CERVICAL: Status: ACTIVE | Noted: 2018-12-17

## 2018-12-27 PROBLEM — M47.812 CERVICAL SPONDYLOSIS: Status: ACTIVE | Noted: 2018-11-16

## 2018-12-27 PROCEDURE — 99214 OFFICE O/P EST MOD 30 MIN: CPT | Performed by: INTERNAL MEDICINE

## 2018-12-27 RX ORDER — PEN NEEDLE, DIABETIC 29 G X1/2"
NEEDLE, DISPOSABLE MISCELLANEOUS
COMMUNITY
Start: 2018-12-26 | End: 2020-02-24

## 2018-12-27 RX ORDER — METRONIDAZOLE 7.5 MG/G
GEL VAGINAL
Refills: 0 | COMMUNITY
Start: 2018-12-11 | End: 2019-05-13

## 2018-12-27 RX ORDER — AMLODIPINE BESYLATE AND ATORVASTATIN CALCIUM 2.5; 1 MG/1; MG/1
1 TABLET, FILM COATED ORAL DAILY
COMMUNITY
End: 2018-12-27

## 2018-12-27 RX ORDER — TINIDAZOLE 250 MG/1
TABLET ORAL DAILY
COMMUNITY
End: 2019-05-13

## 2018-12-27 RX ORDER — MULTIVIT WITH MINERALS/LUTEIN
1000 TABLET ORAL DAILY
COMMUNITY
End: 2021-02-09

## 2018-12-27 NOTE — PROGRESS NOTES
Subjective   Marycarmen Gauthier is a 33 y.o. female. Presents with a chief complaint of POTS, Hypertension, fatigue, s/p hysterectomy with a recent set of labs that showed an elevated WBC of 13.2!    History of Present Illness worsening fatigue and associated temperatures with night sweats    The following portions of the patient's history were reviewed and updated as appropriate: allergies, current medications, past family history, past medical history, past social history, past surgical history and problem list.    Review of Systems   Cardiovascular: Positive for palpitations.   Gastrointestinal: Positive for abdominal pain and nausea.   Musculoskeletal: Positive for back pain and neck pain.   Neurological: Positive for dizziness and light-headedness. Negative for confusion.       Objective   Physical Exam   Constitutional: She is oriented to person, place, and time. She appears well-developed and well-nourished.   HENT:   Head: Normocephalic and atraumatic.   Eyes: EOM are normal. Pupils are equal, round, and reactive to light.   Neck: Normal range of motion. Neck supple.   Cardiovascular: Normal rate, regular rhythm and normal heart sounds.   Pulmonary/Chest: Effort normal and breath sounds normal.   Abdominal: Soft. Bowel sounds are normal.   Genitourinary:   Genitourinary Comments: S/p abdominal hysterectomy   Musculoskeletal:   Diffuse joint and muscle pain   Neurological: She is alert and oriented to person, place, and time.   Skin: Skin is warm and dry.   Psychiatric: She has a normal mood and affect. Her behavior is normal. Judgment and thought content normal.   Nursing note and vitals reviewed.        Assessment/Plan   Diagnoses and all orders for this visit:    Essential hypertension  Comments:  well controlled    POTS (postural orthostatic tachycardia syndrome)  Comments:  stable for now    H/O abdominal hysterectomy  Comments:  doing well post surgery    Leukocytosis, unspecified  type  Comments:  Hematology follow up  Orders:  -     Ambulatory Referral to Hematology

## 2019-01-14 ENCOUNTER — APPOINTMENT (OUTPATIENT)
Dept: MRI IMAGING | Facility: HOSPITAL | Age: 34
End: 2019-01-14
Attending: EMERGENCY MEDICINE

## 2019-01-14 ENCOUNTER — HOSPITAL ENCOUNTER (EMERGENCY)
Facility: HOSPITAL | Age: 34
Discharge: HOME | End: 2019-01-14
Attending: EMERGENCY MEDICINE | Admitting: EMERGENCY MEDICINE

## 2019-01-14 VITALS
DIASTOLIC BLOOD PRESSURE: 83 MMHG | HEIGHT: 60 IN | OXYGEN SATURATION: 97 % | SYSTOLIC BLOOD PRESSURE: 123 MMHG | RESPIRATION RATE: 18 BRPM | WEIGHT: 155 LBS | TEMPERATURE: 98.5 F | BODY MASS INDEX: 30.43 KG/M2 | HEART RATE: 115 BPM

## 2019-01-14 DIAGNOSIS — H53.2 DIPLOPIA: Primary | ICD-10-CM

## 2019-01-14 DIAGNOSIS — R10.30 LOWER ABDOMINAL PAIN: ICD-10-CM

## 2019-01-14 LAB
ALBUMIN SERPL-MCNC: 4.2 G/DL (ref 3.5–5.2)
ALBUMIN/GLOB SERPL: 1.4 G/DL
ALP SERPL-CCNC: 81 U/L (ref 39–117)
ALT SERPL W P-5'-P-CCNC: 17 U/L (ref 1–33)
ANION GAP SERPL CALCULATED.3IONS-SCNC: 10.2 MMOL/L
AST SERPL-CCNC: 18 U/L (ref 1–32)
BASOPHILS # BLD AUTO: 0.03 10*3/MM3 (ref 0–0.2)
BASOPHILS NFR BLD AUTO: 0.4 % (ref 0–1.5)
BILIRUB SERPL-MCNC: 0.2 MG/DL (ref 0.1–1.2)
BILIRUB UR QL STRIP: NEGATIVE
BUN BLD-MCNC: 8 MG/DL (ref 6–20)
BUN/CREAT SERPL: 14.3 (ref 7–25)
CALCIUM SPEC-SCNC: 9.1 MG/DL (ref 8.6–10.5)
CHLORIDE SERPL-SCNC: 102 MMOL/L (ref 98–107)
CLARITY UR: CLEAR
CO2 SERPL-SCNC: 28.8 MMOL/L (ref 22–29)
COLOR UR: YELLOW
CREAT BLD-MCNC: 0.56 MG/DL (ref 0.57–1)
DEPRECATED RDW RBC AUTO: 44.4 FL (ref 37–54)
EOSINOPHIL # BLD AUTO: 0.14 10*3/MM3 (ref 0–0.7)
EOSINOPHIL NFR BLD AUTO: 1.9 % (ref 0.3–6.2)
ERYTHROCYTE [DISTWIDTH] IN BLOOD BY AUTOMATED COUNT: 13.2 % (ref 11.7–13)
GFR SERPL CREATININE-BSD FRML MDRD: 122 ML/MIN/1.73
GLOBULIN UR ELPH-MCNC: 3.1 GM/DL
GLUCOSE BLD-MCNC: 86 MG/DL (ref 65–99)
GLUCOSE UR STRIP-MCNC: NEGATIVE MG/DL
HCT VFR BLD AUTO: 40.5 % (ref 35.6–45.5)
HGB BLD-MCNC: 13.5 G/DL (ref 11.9–15.5)
HGB UR QL STRIP.AUTO: NEGATIVE
HOLD SPECIMEN: NORMAL
HOLD SPECIMEN: NORMAL
IMM GRANULOCYTES # BLD AUTO: 0.03 10*3/MM3 (ref 0–0.03)
IMM GRANULOCYTES NFR BLD AUTO: 0.4 % (ref 0–0.5)
KETONES UR QL STRIP: NEGATIVE
LEUKOCYTE ESTERASE UR QL STRIP.AUTO: NEGATIVE
LYMPHOCYTES # BLD AUTO: 2.81 10*3/MM3 (ref 0.9–4.8)
LYMPHOCYTES NFR BLD AUTO: 38.2 % (ref 19.6–45.3)
MCH RBC QN AUTO: 30.8 PG (ref 26.9–32)
MCHC RBC AUTO-ENTMCNC: 33.3 G/DL (ref 32.4–36.3)
MCV RBC AUTO: 92.5 FL (ref 80.5–98.2)
MONOCYTES # BLD AUTO: 0.54 10*3/MM3 (ref 0.2–1.2)
MONOCYTES NFR BLD AUTO: 7.3 % (ref 5–12)
NEUTROPHILS # BLD AUTO: 3.83 10*3/MM3 (ref 1.9–8.1)
NEUTROPHILS NFR BLD AUTO: 52.2 % (ref 42.7–76)
NITRITE UR QL STRIP: NEGATIVE
NRBC BLD AUTO-RTO: 0 /100 WBC (ref 0–0)
PH UR STRIP.AUTO: 7 [PH] (ref 5–8)
PLATELET # BLD AUTO: 335 10*3/MM3 (ref 140–500)
PMV BLD AUTO: 9.2 FL (ref 6–12)
POTASSIUM BLD-SCNC: 3.7 MMOL/L (ref 3.5–5.2)
PROT SERPL-MCNC: 7.3 G/DL (ref 6–8.5)
PROT UR QL STRIP: NEGATIVE
RBC # BLD AUTO: 4.38 10*6/MM3 (ref 3.9–5.2)
SODIUM BLD-SCNC: 141 MMOL/L (ref 136–145)
SP GR UR STRIP: 1.02 (ref 1–1.03)
UROBILINOGEN UR QL STRIP: NORMAL
WBC NRBC COR # BLD: 7.35 10*3/MM3 (ref 4.5–10.7)
WHOLE BLOOD HOLD SPECIMEN: NORMAL
WHOLE BLOOD HOLD SPECIMEN: NORMAL

## 2019-01-14 PROCEDURE — A9577 INJ MULTIHANCE: HCPCS | Performed by: EMERGENCY MEDICINE

## 2019-01-14 PROCEDURE — 80053 COMPREHEN METABOLIC PANEL: CPT | Performed by: EMERGENCY MEDICINE

## 2019-01-14 PROCEDURE — 70553 MRI BRAIN STEM W/O & W/DYE: CPT

## 2019-01-14 PROCEDURE — 99283 EMERGENCY DEPT VISIT LOW MDM: CPT

## 2019-01-14 PROCEDURE — 0 GADOBENATE DIMEGLUMINE 529 MG/ML SOLUTION: Performed by: EMERGENCY MEDICINE

## 2019-01-14 PROCEDURE — 85025 COMPLETE CBC W/AUTO DIFF WBC: CPT | Performed by: EMERGENCY MEDICINE

## 2019-01-14 PROCEDURE — 81003 URINALYSIS AUTO W/O SCOPE: CPT | Performed by: EMERGENCY MEDICINE

## 2019-01-14 RX ORDER — SODIUM CHLORIDE 0.9 % (FLUSH) 0.9 %
10 SYRINGE (ML) INJECTION AS NEEDED
Status: DISCONTINUED | OUTPATIENT
Start: 2019-01-14 | End: 2019-01-14 | Stop reason: HOSPADM

## 2019-01-14 RX ADMIN — GADOBENATE DIMEGLUMINE 15 ML: 529 INJECTION, SOLUTION INTRAVENOUS at 11:45

## 2019-01-14 NOTE — ED PROVIDER NOTES
EMERGENCY DEPARTMENT ENCOUNTER    CHIEF COMPLAINT  Chief Complaint: Diplopia  History given by: Patient  Room Number: 02/02  PMD: Gene Diaz MD   Neurologist: Dr. Frandy Lomas      HPI:  Pt is a 34 y.o. female with hx of Lyme and autonomic dysfunction with POTS who presents complaining of intermittent horizontal diplopia that began upon waking up this morning. Reports episodes are persistent for a few minutes, self resolve, and then return. Notes similar episode ~1 week ago that lasted ~1 hour and resolved after sleep. Also reports intermittent moderate to severe abd pain and distention x2-3 weeks, and worsening fatigue x1 month. Denies headache. No other complaints at this time.     Duration/Onset/Timing: hours, sudden, intermittent   Location: Bilateral eyes   Radiation: n/a  Quality: n/a  Intensity/Severity: n/a  Associated Symptoms: abd pain, fatigue  Aggravating or Alleviating Factors: none reported   Previous Episodes: Similar symptoms 1 week ago, resolved after sleep, was not evaluated by a provider at that time.       PAST MEDICAL HISTORY  Active Ambulatory Problems     Diagnosis Date Noted   • POTS (postural orthostatic tachycardia syndrome) 05/16/2018   • Essential hypertension 05/16/2018   • Hyperlipidemia 05/16/2018   • Aftercare following left ankle joint replacement surgery 07/30/2015   • Bilateral numbness and tingling of arms and legs 02/03/2016   • Anxiety 12/12/2012   • Carpal tunnel syndrome on both sides 06/06/2016   • Chronic frontal sinusitis 11/16/2017   • Chronic seasonal allergic rhinitis due to pollen 11/16/2017   • Chronic sinusitis 01/01/2006   • VICKERS (dyspnea on exertion) 08/07/2017   • Falls 01/01/2014   • Fatigue 12/18/2014   • Heart palpitations 08/07/2017   • Hypermobile joint syndrome of multiple sites 08/29/2016   • Hypermobility syndrome 01/01/2014   • IgA deficiency, selective (CMS/HCC) 03/09/2017   • LBP (low back pain) 07/09/2018   • Low back pain at multiple sites  2015   • Myalgia 2016   • Nausea 2017   • Nephrolithiasis 2017   • Nystagmus, optokinetic 2016   • Osteoarthritis 2016   • PTSD (post-traumatic stress disorder) 2014   • RAD (reactive airway disease) 2016   • Reflux 2014   • Small fiber neuropathy 2017   • Syncope and collapse 2016   • Tachycardia 2017   • Cervical spondylosis 2018   • DDD (degenerative disc disease), cervical 2018   • H/O abdominal hysterectomy 2018   • Leukocytosis 2018     Resolved Ambulatory Problems     Diagnosis Date Noted   • No Resolved Ambulatory Problems     Past Medical History:   Diagnosis Date   • Anxiety    • Asthma    • Autonomic dysfunction    • Chronic pain    • Chronic sinus infection    • Depression    • Dizziness    • Essential hypertension    • Fatigue    • Fibromyalgia    • Flexor hallucis longus tendinitis 2013   • History of anemia    • Hyperlipidemia    • Hypermobile joints    • Leukocytosis    • Numbness and tingling in both hands    • Obesity    • Osteoarthritis    • Palpitations    • POTS (postural orthostatic tachycardia syndrome) 2017   • PROM (premature rupture of membranes)    • PTSD (post-traumatic stress disorder) 2014   • PTSD (post-traumatic stress disorder)    • Snoring    • SOB (shortness of breath)    • Syncope and collapse        PAST SURGICAL HISTORY  Past Surgical History:   Procedure Laterality Date   • ANKLE SURGERY Right 2014    FHL tendon release   • BREAST SURGERY  10/15/2013    Reduction   • CARPAL TUNNEL RELEASE Right 2017    REVISE MEDIAN N/CARPAL TUNNEL SURG   •  SECTION     • ROTATOR CUFF REPAIR Right    • SINUS SURGERY     • WISDOM TOOTH EXTRACTION         FAMILY HISTORY  Family History   Problem Relation Age of Onset   • Hypertension Mother    • Other Mother         Lipids   • Obesity Mother    • Cancer Father         Non-Hodgkin's Lymphoma   • Obesity Father     • Cancer Sister         Hodgkin's Lymphoma   • Depression Sister    • Obesity Sister    • Colon cancer Maternal Grandmother    • Hypertension Maternal Grandmother    • Stroke Maternal Grandmother    • Heart disease Paternal Grandfather    • Hyperlipidemia Paternal Grandfather    • Sudden death Maternal Grandfather    • Aneurysm Maternal Grandfather    • Clotting disorder Neg Hx    • Diabetes Neg Hx        SOCIAL HISTORY  Social History     Socioeconomic History   • Marital status:      Spouse name: Not on file   • Number of children: 1   • Years of education: College   • Highest education level: Not on file   Social Needs   • Financial resource strain: Not on file   • Food insecurity - worry: Not on file   • Food insecurity - inability: Not on file   • Transportation needs - medical: Not on file   • Transportation needs - non-medical: Not on file   Occupational History   • Occupation: RN     Employer: [employer]   Tobacco Use   • Smoking status: Never Smoker   • Smokeless tobacco: Never Used   • Tobacco comment: CAFFEINE USE   Substance and Sexual Activity   • Alcohol use: Yes     Alcohol/week: 0.6 oz     Types: 1 Standard drinks or equivalent per week     Comment: occasional   • Drug use: No   • Sexual activity: Yes     Partners: Male     Birth control/protection: Condom, None   Other Topics Concern   • Not on file   Social History Narrative   • Not on file       ALLERGIES  Benadryl  [diphenhydramine hcl]; Diphenhydramine; Latex; and Sulfamethoxazole-trimethoprim    REVIEW OF SYSTEMS  Review of Systems   Constitutional: Positive for fatigue. Negative for fever.   HENT: Negative.  Negative for sore throat.    Eyes: Positive for visual disturbance (diplopia).   Respiratory: Negative.  Negative for cough and shortness of breath.    Cardiovascular: Negative.  Negative for chest pain.   Gastrointestinal: Positive for abdominal pain (moderate). Negative for blood in stool and diarrhea.   Genitourinary:  Negative.  Negative for dysuria and hematuria.   Musculoskeletal: Negative.  Negative for back pain.   Skin: Negative.  Negative for rash.   Neurological: Negative.  Negative for headaches.   All other systems reviewed and are negative.      PHYSICAL EXAM  ED Triage Vitals   Temp Heart Rate Resp BP SpO2   01/14/19 0917 01/14/19 0917 01/14/19 0937 01/14/19 0936 01/14/19 0917   98.5 °F (36.9 °C) 115 18 123/83 97 %      Temp src Heart Rate Source Patient Position BP Location FiO2 (%)   01/14/19 0917 01/14/19 0917 -- -- --   Tympanic Monitor          Physical Exam   Constitutional: No distress.   HENT:   Head: Normocephalic and atraumatic.   Mouth/Throat: Oropharynx is clear and moist.   Eyes: Conjunctivae and EOM are normal.   Cardiovascular: Regular rhythm. Tachycardia present.   Pulmonary/Chest: Breath sounds normal. No respiratory distress.   Abdominal: There is no tenderness.   Musculoskeletal: She exhibits no edema or tenderness.   Neurological: She is alert. She has normal motor skills, normal sensation, normal strength and intact cranial nerves. She displays normal speech. No cranial nerve deficit or sensory deficit. She has a normal Finger-Nose-Finger Test and a normal Heel to Oconnell Test.   Skin: No rash noted.   Nursing note and vitals reviewed.      LAB RESULTS  Lab Results (last 24 hours)     Procedure Component Value Units Date/Time    CBC & Differential [[id number]] Collected:  01/14/19 0939    Specimen:  Blood Updated:  01/14/19 1054    Narrative:       The following orders were created for panel order CBC & Differential.  Procedure                               Abnormality         Status                     ---------                               -----------         ------                     CBC Auto Differential[[id number]]        Abnormal            Final result                 Please view results for these tests on the individual orders.    Comprehensive Metabolic Panel [[id number]]  (Abnormal)  Collected:  01/14/19 0939    Specimen:  Blood Updated:  01/14/19 1105     Glucose 86 mg/dL      BUN 8 mg/dL      Creatinine 0.56 mg/dL      Sodium 141 mmol/L      Potassium 3.7 mmol/L      Chloride 102 mmol/L      CO2 28.8 mmol/L      Calcium 9.1 mg/dL      Total Protein 7.3 g/dL      Albumin 4.20 g/dL      ALT (SGPT) 17 U/L      AST (SGOT) 18 U/L      Alkaline Phosphatase 81 U/L      Total Bilirubin 0.2 mg/dL      eGFR Non African Amer 122 mL/min/1.73      Globulin 3.1 gm/dL      A/G Ratio 1.4 g/dL      BUN/Creatinine Ratio 14.3     Anion Gap 10.2 mmol/L     CBC Auto Differential [[id number]]  (Abnormal) Collected:  01/14/19 0939    Specimen:  Blood Updated:  01/14/19 1054     WBC 7.35 10*3/mm3      RBC 4.38 10*6/mm3      Hemoglobin 13.5 g/dL      Hematocrit 40.5 %      MCV 92.5 fL      MCH 30.8 pg      MCHC 33.3 g/dL      RDW 13.2 %      RDW-SD 44.4 fl      MPV 9.2 fL      Platelets 335 10*3/mm3      Neutrophil % 52.2 %      Lymphocyte % 38.2 %      Monocyte % 7.3 %      Eosinophil % 1.9 %      Basophil % 0.4 %      Immature Grans % 0.4 %      Neutrophils, Absolute 3.83 10*3/mm3      Lymphocytes, Absolute 2.81 10*3/mm3      Monocytes, Absolute 0.54 10*3/mm3      Eosinophils, Absolute 0.14 10*3/mm3      Basophils, Absolute 0.03 10*3/mm3      Immature Grans, Absolute 0.03 10*3/mm3      nRBC 0.0 /100 WBC     Urinalysis With Culture If Indicated - Urine, Clean Catch [[id number]]  (Normal) Collected:  01/14/19 0943    Specimen:  Urine, Clean Catch Updated:  01/14/19 1011     Color, UA Yellow     Appearance, UA Clear     pH, UA 7.0     Specific Gravity, UA 1.018     Glucose, UA Negative     Ketones, UA Negative     Bilirubin, UA Negative     Blood, UA Negative     Protein, UA Negative     Leuk Esterase, UA Negative     Nitrite, UA Negative     Urobilinogen, UA 0.2 E.U./dL    Narrative:       Urine microscopic not indicated.          I ordered the above labs and reviewed the results    RADIOLOGY  MRI Brain With & Without  Contrast   Final Result   No acute infarct. No enhancing lesions of brain. No prominent white   matter disease. Follow-up as indications persist.           This report was finalized on 1/14/2019 12:35 PM by Dr. Rafal Landa M.D.               I ordered the above noted radiological studies. Interpreted by radiologist. Reviewed by me in PACS.       PROCEDURES  Procedures      PROGRESS AND CONSULTS       1040: Informed of plan for labs, MRI brain.     1254: MRI and labs reviewed, negative for acute pathology.     1301: Rechecked pt. Pt is resting comfortably. Notified pt of normal results. Discussed the plan to discharge the pt home. I instructed the pt to follow up with PCP, GI, opthomology. Pt understands and agrees with the plan, all questions answered.        MEDICAL DECISION MAKING  Results were reviewed/discussed with the patient and they were also made aware of online access. Pt also made aware that some labs, such as cultures, will not be resulted during ER visit and follow up with PMD is necessary.     MDM       DIAGNOSIS  Final diagnoses:   Diplopia   Lower abdominal pain       DISPOSITION  DISCHARGE    Patient discharged in stable condition.    Reviewed implications of results, diagnosis, meds, responsibility to follow up, warning signs and symptoms of possible worsening, potential complications and reasons to return to ER.    Patient/Family voiced understanding of above instructions.    Discussed plan for discharge, as there is no emergent indication for admission. Patient referred to primary care provider for BP management due to today's BP. Pt/family is agreeable and understands need for follow up and repeat testing.  Pt is aware that discharge does not mean that nothing is wrong but it indicates no emergency is present that requires admission and they must continue care with follow-up as given below or physician of their choice.     FOLLOW-UP  Gene Diaz MD  [facility] [place]  [facility]  [address] [place] [address]  [phone]    In 1 week  If Not Better         Medication List      No changes were made to your prescriptions during this visit.           Latest Documented Vital Signs:  As of 1:02 PM  BP- 123/83 HR- 115 Temp- 98.5 °F (36.9 °C) (Tympanic) O2 sat- 97%    --  Documentation assistance provided by karla Rapp for Dr. Sanz. Information recorded by the scribe was done at my direction and has been verified and validated by me.            Uma Rapp  01/14/19 [id number]       Yfn Sanz MD  01/14/19 [id number]

## 2019-01-16 ENCOUNTER — APPOINTMENT (OUTPATIENT)
Dept: LAB | Facility: HOSPITAL | Age: 34
End: 2019-01-16

## 2019-01-16 ENCOUNTER — CONSULT (OUTPATIENT)
Dept: ONCOLOGY | Facility: CLINIC | Age: 34
End: 2019-01-16

## 2019-01-16 VITALS
RESPIRATION RATE: 16 BRPM | HEART RATE: 100 BPM | BODY MASS INDEX: 30.73 KG/M2 | OXYGEN SATURATION: 100 % | DIASTOLIC BLOOD PRESSURE: 97 MMHG | HEIGHT: 60 IN | WEIGHT: 156.5 LBS | TEMPERATURE: 98.1 F | SYSTOLIC BLOOD PRESSURE: 140 MMHG

## 2019-01-16 DIAGNOSIS — R14.0 ABDOMINAL DISTENSION: Primary | ICD-10-CM

## 2019-01-16 DIAGNOSIS — R06.09 DOE (DYSPNEA ON EXERTION): ICD-10-CM

## 2019-01-16 PROCEDURE — 99245 OFF/OP CONSLTJ NEW/EST HI 55: CPT | Performed by: INTERNAL MEDICINE

## 2019-01-16 PROCEDURE — G0463 HOSPITAL OUTPT CLINIC VISIT: HCPCS | Performed by: INTERNAL MEDICINE

## 2019-01-16 RX ORDER — SENNOSIDES 8.6 MG
650 CAPSULE ORAL AS NEEDED
COMMUNITY

## 2019-01-16 RX ORDER — CYCLOSPORINE 0.5 MG/ML
1 EMULSION OPHTHALMIC 2 TIMES DAILY
COMMUNITY

## 2019-01-16 RX ORDER — BORIC ACID
600 POWDER (GRAM) MISCELLANEOUS
COMMUNITY
End: 2019-06-27

## 2019-01-16 NOTE — PROGRESS NOTES
REFERRING PROVIDER:    Gene Diaz MD  [address] [address]    REASON FOR CONSULTATION:    Leukocytosis    HISTORY OF PRESENT ILLNESS:  Marycarmen Gauthier is a 34 y.o. female who is referred today for further evaluation of leukocytosis.    On  she was found to have a mild leukocytosis with white blood cell count 13.2.  The differential showed 84% neutrophils and 10% lymphocytes.  Hemoglobin and platelets were normal.  She was referred for further evaluation of this.    Her recent history dates to about 6 years ago.  She had a  section and prematurely delivered her son who spent several months in the  intensive care unit.  She developed some PTSD as result of this.  She subsequently has been diagnosed with autonomic dysfunction.  She was seen at the [facility] for evaluation of this.  She was also evaluated by rheumatology there.    She's had numbness in her fingertips for couple of years.  This started with burning in her fingers.  She has seen neurology for this.  She is on medication with improvement in the burning but she still has numbness.  She has chronic shortness of breath and dyspnea on exertion.    More recently over the past 4-6 weeks she has had some worsening fatigue.  She has had some night sweats.  No fevers.    She had a hysterectomy in 2018.  She subsequently had some vaginal infections following this.  After surgery she continues to have some issues with abdominal fullness and early satiety with anorexia.  She is worried that something is going on in her abdomen.  She has occasional abdominal pain but mostly discomfort associated with the bloating.  No urinary symptoms.  She does have some nausea that seems to be associated with the autonomic dysfunction.    She was diagnosed with possible chronic Lyme disease.  She was treated with antibiotics for 3 months with no change in her issues.    There is concern for hematologic  disorder as her father has non-Hodgkin's lymphoma and her sister had Hodgkin's lymphoma diagnosed at age 27.        Past Medical History:   Diagnosis Date   • Anxiety    • Asthma    • Autonomic dysfunction    • Bulging lumbar disc    • Chronic pain    • Chronic sinus infection    • Depression    • Dizziness    • Essential hypertension    • Fatigue    • Fibromyalgia    • Flexor hallucis longus tendinitis 2013   • H/O Bilateral carpal tunnel syndrome    • History of abnormal cervical Pap smear    • History of anemia    • History of tachycardia     POTS   • Hyperlipidemia    • Hypermobile joint syndrome of multiple sites    • Hypermobile joints    • Leukocytosis    • Numbness and tingling in both hands    • Obesity    • Osteoarthritis    • Palpitations    • POTS (postural orthostatic tachycardia syndrome) 2017   • PROM (premature rupture of membranes)    • PTSD (post-traumatic stress disorder) 2014   • PTSD (post-traumatic stress disorder)    • Seasonal allergies    • Snoring    • SOB (shortness of breath)    • Syncope and collapse        Past Surgical History:   Procedure Laterality Date   • ANKLE SURGERY Right 2014    FHL tendon release   • BREAST SURGERY  10/15/2013    Reduction   • CARPAL TUNNEL RELEASE Right 2017    REVISE MEDIAN N/CARPAL TUNNEL SURG   •  SECTION     • ROTATOR CUFF REPAIR Right    • SINUS SURGERY     • WISDOM TOOTH EXTRACTION         SOCIAL HISTORY:   reports that  has never smoked. she has never used smokeless tobacco. She reports that she does not drink alcohol or use drugs.    FAMILY HISTORY:  family history includes Aneurysm in her maternal grandfather; Cancer in her father and sister; Colon cancer in her maternal grandmother; Depression in her sister; Heart disease in her paternal grandfather; Hyperlipidemia in her paternal grandfather; Hypertension in her maternal grandmother and mother; Obesity in her father, mother, and sister; Other in her  mother; Stroke in her maternal grandmother; Sudden death in her maternal grandfather.    ALLERGIES:  Allergies   Allergen Reactions   • Benadryl  [Diphenhydramine Hcl] Other (See Comments)   • Diphenhydramine Other (See Comments)     Agitated, and restless   • Latex Rash   • Sulfamethoxazole-Trimethoprim Itching and Rash       MEDICATIONS:  The medication list has been reviewed with the patient by the medical assistant, and the list has been updated in the electronic medical record, which I reviewed.  Medication dosages and frequencies were confirmed to be accurate.    Review of Systems   Constitutional: Positive for diaphoresis and fatigue. Negative for appetite change, chills, fever and unexpected weight change.   HENT: Negative for congestion, dental problem, facial swelling, hearing loss, mouth sores, nosebleeds, rhinorrhea, sore throat, tinnitus, trouble swallowing and voice change.    Eyes: Positive for visual disturbance.   Respiratory: Positive for cough. Negative for chest tightness, shortness of breath, wheezing and stridor.    Cardiovascular: Positive for chest pain. Negative for palpitations and leg swelling.   Gastrointestinal: Positive for abdominal distention, constipation and nausea. Negative for abdominal pain, blood in stool, diarrhea and vomiting.   Endocrine: Negative.    Genitourinary: Negative for difficulty urinating, dysuria, flank pain, frequency, hematuria, menstrual problem, pelvic pain, vaginal bleeding and vaginal discharge.   Musculoskeletal: Positive for arthralgias and back pain. Negative for joint swelling, myalgias, neck pain and neck stiffness.   Skin: Negative for color change, rash and wound.   Allergic/Immunologic: Negative for environmental allergies.   Neurological: Positive for dizziness, light-headedness and numbness. Negative for seizures, syncope, weakness and headaches.   Hematological: Negative for adenopathy. Does not bruise/bleed easily.   Psychiatric/Behavioral:  "Negative for agitation, confusion and sleep disturbance. The patient is not nervous/anxious.    All other systems reviewed and are negative.      Vitals:    01/16/19 1514   BP: 140/97   Pulse: 100   Resp: 16   Temp: 98.1 °F (36.7 °C)   TempSrc: Oral   SpO2: 100%   Weight: 71 kg (156 lb 8 oz)   Height: 152.4 cm (60\")   PainSc: 0-No pain  Comment: leukocytosis       Physical Exam   Constitutional: She is oriented to person, place, and time. She appears well-developed and well-nourished.   HENT:   Head: Normocephalic and atraumatic.   Nose: Nose normal.   Eyes: Conjunctivae and EOM are normal. Pupils are equal, round, and reactive to light.   Neck: Neck supple.   Cardiovascular: Normal rate, regular rhythm, S1 normal, S2 normal and normal heart sounds. Exam reveals no gallop and no friction rub.   No murmur heard.  Pulmonary/Chest: Effort normal and breath sounds normal. No stridor. No respiratory distress. She has no wheezes. She has no rhonchi. She has no rales. She exhibits no tenderness.   Abdominal: Soft. Bowel sounds are normal. She exhibits no distension and no mass. There is tenderness (mild diffuse). There is no rebound and no guarding.   Musculoskeletal: Normal range of motion. She exhibits no edema.   Lymphadenopathy:     She has no cervical adenopathy.     She has no axillary adenopathy.        Right: No inguinal and no supraclavicular adenopathy present.        Left: No inguinal and no supraclavicular adenopathy present.   Neurological: She is alert and oriented to person, place, and time. No cranial nerve deficit or sensory deficit.   Skin: Skin is warm and dry. No rash noted. No erythema.   Psychiatric: She has a normal mood and affect. Her behavior is normal. Judgment and thought content normal.   Vitals reviewed.      DIAGNOSTIC DATA:  Results for orders placed or performed during the hospital encounter of 01/14/19   Urinalysis With Culture If Indicated - Urine, Clean Catch   Result Value Ref Range    " Color, UA Yellow Yellow, Straw    Appearance, UA Clear Clear    pH, UA 7.0 5.0 - 8.0    Specific Gravity, UA 1.018 1.005 - 1.030    Glucose, UA Negative Negative    Ketones, UA Negative Negative    Bilirubin, UA Negative Negative    Blood, UA Negative Negative    Protein, UA Negative Negative    Leuk Esterase, UA Negative Negative    Nitrite, UA Negative Negative    Urobilinogen, UA 0.2 E.U./dL 0.2 - 1.0 E.U./dL   Comprehensive Metabolic Panel   Result Value Ref Range    Glucose 86 65 - 99 mg/dL    BUN 8 6 - 20 mg/dL    Creatinine 0.56 (L) 0.57 - 1.00 mg/dL    Sodium 141 136 - 145 mmol/L    Potassium 3.7 3.5 - 5.2 mmol/L    Chloride 102 98 - 107 mmol/L    CO2 28.8 22.0 - 29.0 mmol/L    Calcium 9.1 8.6 - 10.5 mg/dL    Total Protein 7.3 6.0 - 8.5 g/dL    Albumin 4.20 3.50 - 5.20 g/dL    ALT (SGPT) 17 1 - 33 U/L    AST (SGOT) 18 1 - 32 U/L    Alkaline Phosphatase 81 39 - 117 U/L    Total Bilirubin 0.2 0.1 - 1.2 mg/dL    eGFR Non African Amer 122 >60 mL/min/1.73    Globulin 3.1 gm/dL    A/G Ratio 1.4 g/dL    BUN/Creatinine Ratio 14.3 7.0 - 25.0    Anion Gap 10.2 mmol/L   CBC Auto Differential   Result Value Ref Range    WBC 7.35 4.50 - 10.70 10*3/mm3    RBC 4.38 3.90 - 5.20 10*6/mm3    Hemoglobin 13.5 11.9 - 15.5 g/dL    Hematocrit 40.5 35.6 - 45.5 %    MCV 92.5 80.5 - 98.2 fL    MCH 30.8 26.9 - 32.0 pg    MCHC 33.3 32.4 - 36.3 g/dL    RDW 13.2 (H) 11.7 - 13.0 %    RDW-SD 44.4 37.0 - 54.0 fl    MPV 9.2 6.0 - 12.0 fL    Platelets 335 140 - 500 10*3/mm3    Neutrophil % 52.2 42.7 - 76.0 %    Lymphocyte % 38.2 19.6 - 45.3 %    Monocyte % 7.3 5.0 - 12.0 %    Eosinophil % 1.9 0.3 - 6.2 %    Basophil % 0.4 0.0 - 1.5 %    Immature Grans % 0.4 0.0 - 0.5 %    Neutrophils, Absolute 3.83 1.90 - 8.10 10*3/mm3    Lymphocytes, Absolute 2.81 0.90 - 4.80 10*3/mm3    Monocytes, Absolute 0.54 0.20 - 1.20 10*3/mm3    Eosinophils, Absolute 0.14 0.00 - 0.70 10*3/mm3    Basophils, Absolute 0.03 0.00 - 0.20 10*3/mm3    Immature Grans, Absolute  0.03 0.00 - 0.03 10*3/mm3    nRBC 0.0 0.0 - 0.0 /100 WBC   Light Blue Top   Result Value Ref Range    Extra Tube hold for add-on    Green Top (Gel)   Result Value Ref Range    Extra Tube Hold for add-ons.    Lavender Top   Result Value Ref Range    Extra Tube hold for add-on    Gold Top - SST   Result Value Ref Range    Extra Tube Hold for add-ons.        IMAGING:    I personally reviewed MRI images of her brain from 1/14/2019.  No infarction.  No lesions.    ASSESSMENT:  This is a 34 y.o. female with:  1.  Multiple medical problems including autonomic dysfunction, possible Lyme disease, postural orthostatic tachycardia syndrome, cervical radiculopathy, PTSD, fibromyalgia, chronic fatigue, history of endometriosis status post hysterectomy in September 2018, SICCA syndrome, and others outlined above.  2.  Recent mild leukocytosis with neutrophilia which has resolved.  Her white blood cell count and differential on 1/14/2019 were normal.  3.  Abdominal distention and discomfort: CT scan of the abdomen and pelvis with contrast  4.  Chest pain and dyspnea on exertion: CT chest with contrast  5.  Family history of lymphoma:  Father with non-Hodgkin lymphoma diagnosed at age 61.  Sr. with a history of Hodgkin lymphoma diagnosed at age 27.  No obvious signs of lymphoma by her history or physical exam at this point.     PLAN:   1.  Her CBC is normal but given her family history and given her symptoms we will go ahead and obtain a CT scan of her chest abdomen and pelvis with contrast within the next couple of weeks.  I will see her back after that with a CBC.

## 2019-01-22 ENCOUNTER — TELEPHONE (OUTPATIENT)
Dept: ONCOLOGY | Facility: CLINIC | Age: 34
End: 2019-01-22

## 2019-01-22 ENCOUNTER — APPOINTMENT (OUTPATIENT)
Dept: ONCOLOGY | Facility: CLINIC | Age: 34
End: 2019-01-22

## 2019-01-22 ENCOUNTER — APPOINTMENT (OUTPATIENT)
Dept: OTHER | Facility: HOSPITAL | Age: 34
End: 2019-01-22

## 2019-01-22 NOTE — TELEPHONE ENCOUNTER
Called [facility] and cancelled CT also called patient and left message that CT had been cancelled and we are waiting to hear from Dr. Carbajal as to how he would like to proceed.  FARHEEN Urban 1/22/19 16:30    ----- Message from Rosei Calix sent at 1/22/2019  4:18 PM EST -----  Regarding: please cancel CT scans that are scheduled tomorrow 01/23/2019  Hello,    Can someone please cancel CT scans that are scheduled tomorrow @ [facility] .    Patients insurance has denied & I am still waiting on an answer from Dr Carbajal, as well on what he would like to do.      Thank you

## 2019-01-23 ENCOUNTER — TELEPHONE (OUTPATIENT)
Dept: ONCOLOGY | Facility: CLINIC | Age: 34
End: 2019-01-23

## 2019-01-23 DIAGNOSIS — R06.09 DOE (DYSPNEA ON EXERTION): Primary | ICD-10-CM

## 2019-01-23 DIAGNOSIS — R14.0 ABDOMINAL DISTENSION: ICD-10-CM

## 2019-01-23 NOTE — TELEPHONE ENCOUNTER
----- Message from Rosie Calix sent at 1/23/2019  9:12 AM EST -----  Regarding: PLEASE SCHEDULE SCANS BEFORE 02/08/19 PATIENTS NEXT APPT WITH DR CARBAJAL   Good Morning,    New orders are being placed per Dr Carbajal. Once Mag Teague enters in epic please schedule scans before patients next appointment with Dr Carbajal on 02/08/2019.    Questions, please let me know  Thank you  Have a blessed day      ----- Message -----  From: Adam Carbajal MD  Sent: 1/23/2019   7:56 AM  To: Rosie Calix, #  Subject: RE: insurance has denied.  wanting xray & US#    Can start with CXR and abd u/s as suggested.  [facility]    ----- Message -----  From: Rosie Calix  Sent: 1/22/2019   3:07 PM  To: Adam Carbajal MD, #  Subject: insurance has denied.  wanting xray & US fir#    Hello,  Can someone please review reason CT SCANS scheduled tomorrow @ [facility] MRI has denied.    Please advise if you agree with chest xray & US AP.  If so please place orders.  IF not then I believe a peer to peer is the next step      [address] CT CHEST; with contrast Denied Based on [employer] Chest Imaging Guidelines, we are unable to approve the requested study. A recent chest x-ray is supported within the last 60 days for the clinical indication(s) presented. The clinical information provided does not describe these results and, therefore, the requested chest imaging is not indicated at this time.    [address] 1 CT ABDOMEN and PELVIS; with contrast Denied Based on [employer] Abdomen Imaging Guidelines, we are unable to approve the requested procedure. Guidelines support a recent ultrasound for the clinical indication(s) presented. Ultrasound may help confirm the diagnosis or may help determine the most appropriate next imaging test. Advanced imaging might be indicated when recent ultrasounds have been performed that are technically limited or non-diagnostic. The clinical information provided does not describe these results and, therefore, the requested imaging is not  indicated at this time.         Thank you

## 2019-01-23 NOTE — PROGRESS NOTES
Orders placed for U/S abd and CXR per VO Dr Carbajal for Dyspnea on exertion and abd distension    Insurance denied CT chest abd pelvis

## 2019-01-30 ENCOUNTER — HOSPITAL ENCOUNTER (OUTPATIENT)
Dept: GENERAL RADIOLOGY | Facility: HOSPITAL | Age: 34
Discharge: HOME | End: 2019-01-30
Attending: INTERNAL MEDICINE

## 2019-01-30 ENCOUNTER — HOSPITAL ENCOUNTER (OUTPATIENT)
Dept: ULTRASOUND IMAGING | Facility: HOSPITAL | Age: 34
Discharge: HOME | End: 2019-01-30
Attending: INTERNAL MEDICINE | Admitting: INTERNAL MEDICINE

## 2019-01-30 DIAGNOSIS — R06.09 DOE (DYSPNEA ON EXERTION): ICD-10-CM

## 2019-01-30 DIAGNOSIS — R14.0 ABDOMINAL DISTENSION: ICD-10-CM

## 2019-01-30 PROCEDURE — 76700 US EXAM ABDOM COMPLETE: CPT

## 2019-01-30 PROCEDURE — 71046 X-RAY EXAM CHEST 2 VIEWS: CPT

## 2019-02-08 ENCOUNTER — OFFICE VISIT (OUTPATIENT)
Dept: ONCOLOGY | Facility: CLINIC | Age: 34
End: 2019-02-08

## 2019-02-08 ENCOUNTER — LAB (OUTPATIENT)
Dept: OTHER | Facility: HOSPITAL | Age: 34
End: 2019-02-08

## 2019-02-08 ENCOUNTER — APPOINTMENT (OUTPATIENT)
Dept: OTHER | Facility: HOSPITAL | Age: 34
End: 2019-02-08

## 2019-02-08 VITALS
OXYGEN SATURATION: 100 % | DIASTOLIC BLOOD PRESSURE: 88 MMHG | HEART RATE: 100 BPM | WEIGHT: 155.9 LBS | BODY MASS INDEX: 30.61 KG/M2 | TEMPERATURE: 98 F | RESPIRATION RATE: 16 BRPM | HEIGHT: 60 IN | SYSTOLIC BLOOD PRESSURE: 133 MMHG

## 2019-02-08 DIAGNOSIS — D72.829 LEUKOCYTOSIS, UNSPECIFIED TYPE: Primary | ICD-10-CM

## 2019-02-08 DIAGNOSIS — R06.09 DOE (DYSPNEA ON EXERTION): ICD-10-CM

## 2019-02-08 LAB
BASOPHILS # BLD AUTO: 0.03 10*3/MM3 (ref 0–0.2)
BASOPHILS NFR BLD AUTO: 0.4 % (ref 0–1.5)
DEPRECATED RDW RBC AUTO: 40.6 FL (ref 37–54)
EOSINOPHIL # BLD AUTO: 0.14 10*3/MM3 (ref 0–0.7)
EOSINOPHIL NFR BLD AUTO: 1.8 % (ref 0.3–6.2)
ERYTHROCYTE [DISTWIDTH] IN BLOOD BY AUTOMATED COUNT: 12.6 % (ref 11.7–13)
HCT VFR BLD AUTO: 37.7 % (ref 35.6–45.5)
HGB BLD-MCNC: 12.9 G/DL (ref 11.9–15.5)
IMM GRANULOCYTES # BLD AUTO: 0.02 10*3/MM3 (ref 0–0.03)
IMM GRANULOCYTES NFR BLD AUTO: 0.3 % (ref 0–0.5)
LYMPHOCYTES # BLD AUTO: 3.26 10*3/MM3 (ref 0.9–4.8)
LYMPHOCYTES NFR BLD AUTO: 41.8 % (ref 19.6–45.3)
MCH RBC QN AUTO: 30.1 PG (ref 26.9–32)
MCHC RBC AUTO-ENTMCNC: 34.2 G/DL (ref 32.4–36.3)
MCV RBC AUTO: 87.9 FL (ref 80.5–98.2)
MONOCYTES # BLD AUTO: 0.59 10*3/MM3 (ref 0.2–1.2)
MONOCYTES NFR BLD AUTO: 7.6 % (ref 5–12)
NEUTROPHILS # BLD AUTO: 3.76 10*3/MM3 (ref 1.9–8.1)
NEUTROPHILS NFR BLD AUTO: 48.1 % (ref 42.7–76)
NRBC BLD AUTO-RTO: 0 /100 WBC (ref 0–0)
PLATELET # BLD AUTO: 305 10*3/MM3 (ref 140–500)
PMV BLD AUTO: 9 FL (ref 6–12)
RBC # BLD AUTO: 4.29 10*6/MM3 (ref 3.9–5.2)
WBC NRBC COR # BLD: 7.8 10*3/MM3 (ref 4.5–10.7)

## 2019-02-08 PROCEDURE — 99214 OFFICE O/P EST MOD 30 MIN: CPT | Performed by: INTERNAL MEDICINE

## 2019-02-08 PROCEDURE — 85025 COMPLETE CBC W/AUTO DIFF WBC: CPT | Performed by: INTERNAL MEDICINE

## 2019-02-08 RX ORDER — VALACYCLOVIR HYDROCHLORIDE 1 G/1
TABLET, FILM COATED ORAL
COMMUNITY
Start: 2019-02-07 | End: 2019-05-13 | Stop reason: SDUPTHER

## 2019-02-08 NOTE — PROGRESS NOTES
[facility] GROUP OUTPATIENT FOLLOW UP CLINIC VISIT    REASON FOR FOLLOW-UP:    Leukocytosis    HISTORY OF PRESENT ILLNESS:  Marycarmen Gauthier is a 34 y.o. female who returns today for follow up of the above issue.      She was seen initially on 2019.  At that time we plan for CT imaging of her chest abdomen and pelvis.  However, only a chest x-ray and abdominal ultrasound were approved by her insurance.    She continues to feel about the same no new problems today.  Please see her history below.        HEMATOLOGIC HISTORY:  On  she was found to have a mild leukocytosis with white blood cell count 13.2.  The differential showed 84% neutrophils and 10% lymphocytes.  Hemoglobin and platelets were normal.  She was referred for further evaluation of this.     Her recent history dates to about 6 years ago.  She had a  section and prematurely delivered her son who spent several months in the  intensive care unit.  She developed some PTSD as result of this.  She subsequently has been diagnosed with autonomic dysfunction.  She was seen at the [facility] for evaluation of this.  She was also evaluated by rheumatology there.     She's had numbness in her fingertips for couple of years.  This started with burning in her fingers.  She has seen neurology for this.  She is on medication with improvement in the burning but she still has numbness.  She has chronic shortness of breath and dyspnea on exertion.     More recently over the past 4-6 weeks she has had some worsening fatigue.  She has had some night sweats.  No fevers.     She had a hysterectomy in 2018.  She subsequently had some vaginal infections following this.  After surgery she continues to have some issues with abdominal fullness and early satiety with anorexia.  She is worried that something is going on in her abdomen.  She has occasional abdominal pain but mostly discomfort associated with the bloating.  No  urinary symptoms.  She does have some nausea that seems to be associated with the autonomic dysfunction.     She was diagnosed with possible chronic Lyme disease.  She was treated with antibiotics for 3 months with no change in her issues.     There is concern for hematologic disorder as her father has non-Hodgkin's lymphoma and her sister had Hodgkin's lymphoma diagnosed at age 27.            ALLERGIES:  Allergies   Allergen Reactions   • Benadryl  [Diphenhydramine Hcl] Other (See Comments)   • Diphenhydramine Other (See Comments)     Agitated, and restless   • Latex Rash   • Sulfamethoxazole-Trimethoprim Itching and Rash       MEDICATIONS:  The medication list has been reviewed with the patient by the medical assistant, and the list has been updated in the electronic medical record, which I reviewed.  Medication dosages and frequencies were confirmed to be accurate.    REVIEW OF SYSTEMS:  Constitutional: Positive for diaphoresis and fatigue. Negative for appetite change, chills, fever and unexpected weight change.   HENT: Negative for congestion, dental problem, facial swelling, hearing loss, mouth sores, nosebleeds, rhinorrhea, sore throat, tinnitus, trouble swallowing and voice change.    Eyes: Positive for visual disturbance.   Respiratory: Positive for cough. Negative for chest tightness, shortness of breath, wheezing and stridor.    Cardiovascular: Positive for chest pain. Negative for palpitations and leg swelling.   Gastrointestinal: Positive for abdominal distention, constipation and nausea. Negative for abdominal pain, blood in stool, diarrhea and vomiting.   Endocrine: Negative.    Genitourinary: Negative for difficulty urinating, dysuria, flank pain, frequency, hematuria, menstrual problem, pelvic pain, vaginal bleeding and vaginal discharge.   Musculoskeletal: Positive for arthralgias and back pain. Negative for joint swelling, myalgias, neck pain and neck stiffness.   Skin: Negative for color change,  "rash and wound.   Allergic/Immunologic: Negative for environmental allergies.   Neurological: Positive for dizziness, light-headedness and numbness. Negative for seizures, syncope, weakness and headaches.   Hematological: Negative for adenopathy. Does not bruise/bleed easily.   Psychiatric/Behavioral: Negative for agitation, confusion and sleep disturbance. The patient is not nervous/anxious.    All other systems reviewed and are negative.          Vitals:    02/08/19 1410   BP: 133/88   Pulse: 100   Resp: 16   Temp: 98 °F (36.7 °C)   TempSrc: Oral   SpO2: 100%   Weight: 70.7 kg (155 lb 14.4 oz)   Height: 152.4 cm (60\")   PainSc: 0-No pain       PHYSICAL EXAMINATION:  GENERAL:  Well-developed well-nourished female; awake, alert and oriented, in no acute distress.  SKIN:  Warm and dry, without rashes, purpura, or petechiae.  HEAD:  Normocephalic, atraumatic.  EYES:  Pupils equal, round and reactive to light.  Extraocular movements intact.  Conjunctivae normal.  EARS:  Hearing intact.  NOSE:  Septum midline.  No excoriations or nasal discharge.  MOUTH:  No stomatitis or ulcers.  Lips are normal.  THROAT:  Oropharynx without lesions or exudates.  NECK:  Supple with good range of motion; no thyromegaly or masses; no JVD or bruits.  LYMPHATICS:  No cervical, supraclavicular, or axillary lymphadenopathy.  CHEST:  Lungs are clear to auscultation bilaterally.  No wheezes, rales, or rhonchi.  HEART:  Regular rate; normal rhythm.  No murmurs, gallops or rubs.  ABDOMEN:  Soft, non-tender, non-distended.  Normal active bowel sounds.  No organomegaly.  EXTREMITIES:  No clubbing cyanosis or edema.  NEUROLOGICAL:  No focal neurologic deficits.    DIAGNOSTIC DATA:  Results for orders placed or performed in visit on 02/08/19   CBC Auto Differential   Result Value Ref Range    WBC 7.80 4.50 - 10.70 10*3/mm3    RBC 4.29 3.90 - 5.20 10*6/mm3    Hemoglobin 12.9 11.9 - 15.5 g/dL    Hematocrit 37.7 35.6 - 45.5 %    MCV 87.9 80.5 - 98.2 fL "    MCH 30.1 26.9 - 32.0 pg    MCHC 34.2 32.4 - 36.3 g/dL    RDW 12.6 11.7 - 13.0 %    RDW-SD 40.6 37.0 - 54.0 fl    MPV 9.0 6.0 - 12.0 fL    Platelets 305 140 - 500 10*3/mm3    Neutrophil % 48.1 42.7 - 76.0 %    Lymphocyte % 41.8 19.6 - 45.3 %    Monocyte % 7.6 5.0 - 12.0 %    Eosinophil % 1.8 0.3 - 6.2 %    Basophil % 0.4 0.0 - 1.5 %    Immature Grans % 0.3 0.0 - 0.5 %    Neutrophils, Absolute 3.76 1.90 - 8.10 10*3/mm3    Lymphocytes, Absolute 3.26 0.90 - 4.80 10*3/mm3    Monocytes, Absolute 0.59 0.20 - 1.20 10*3/mm3    Eosinophils, Absolute 0.14 0.00 - 0.70 10*3/mm3    Basophils, Absolute 0.03 0.00 - 0.20 10*3/mm3    Immature Grans, Absolute 0.02 0.00 - 0.03 10*3/mm3    nRBC 0.0 0.0 - 0.0 /100 WBC       IMAGING:  Chest x-ray images 1/30/2019 images personally reviewed.  No acute cardiopulmonary findings.    Ultrasound abdomen 1/30/2019  1. No abnormalities demonstrated in the gallbladder.  2. There are 2 echogenic lesions in the liver which may represent  hemangiomas. Suggest correlation to any prior outside CT scans of the  abdomen or abdominal ultrasound studies. If none are available  short-term followup abdominal ultrasound or a hemangioma protocol CT of  the abdomen with contrast recommended.      ASSESSMENT:  This is a 34 y.o. female with:  1. Multiple medical problems including autonomic dysfunction, possible Lyme disease, postural orthostatic tachycardia syndrome, cervical radiculopathy, PTSD, fibromyalgia, chronic fatigue, history of endometriosis status post hysterectomy in September 2018, SICCA syndrome, and others outlined above.  2.  Recent mild leukocytosis with neutrophilia which resolved.  Her white blood cell count remains normal today.  3.  Abdominal distention and discomfort: Abdominal ultrasound unremarkable aside from too small liver lesions.  She had an MRI of her abdomen done last year in August that confirmed two hemangiomas but there were no other acute findings.  I don't think any further  evaluation is necessary.  4.  History of chest pain and dyspnea on exertion: No chest x-ray was normal.  We will not pursue CT imaging.  5.  Family history of lymphoma: Father with non-Hodgkin lymphoma diagnosed at age 61 and a sister with a history of Hodgkin lymphoma diagnosed at age 27.  She has no specific signs or symptoms of lymphoma.    PLAN:  1.  At this point no further imaging is planned and because her CBC is normal I can see her back as needed.  She will follow-up with her other physicians otherwise.

## 2019-05-13 ENCOUNTER — OFFICE VISIT (OUTPATIENT)
Dept: INTERNAL MEDICINE | Facility: CLINIC | Age: 34
End: 2019-05-13

## 2019-05-13 VITALS
WEIGHT: 186 LBS | DIASTOLIC BLOOD PRESSURE: 70 MMHG | HEIGHT: 60 IN | TEMPERATURE: 98.4 F | BODY MASS INDEX: 36.52 KG/M2 | OXYGEN SATURATION: 99 % | SYSTOLIC BLOOD PRESSURE: 110 MMHG | HEART RATE: 121 BPM

## 2019-05-13 DIAGNOSIS — R11.2 NON-INTRACTABLE VOMITING WITH NAUSEA, UNSPECIFIED VOMITING TYPE: Primary | ICD-10-CM

## 2019-05-13 DIAGNOSIS — R51.9 NONINTRACTABLE HEADACHE, UNSPECIFIED CHRONICITY PATTERN, UNSPECIFIED HEADACHE TYPE: ICD-10-CM

## 2019-05-13 DIAGNOSIS — R10.10 PAIN OF UPPER ABDOMEN: ICD-10-CM

## 2019-05-13 DIAGNOSIS — R19.7 DIARRHEA, UNSPECIFIED TYPE: ICD-10-CM

## 2019-05-13 LAB
ALBUMIN SERPL-MCNC: 4.1 G/DL (ref 3.5–5.2)
ALBUMIN/GLOB SERPL: 1.2 G/DL
ALP SERPL-CCNC: 100 U/L (ref 39–117)
ALT SERPL W P-5'-P-CCNC: 12 U/L (ref 1–33)
ANION GAP SERPL CALCULATED.3IONS-SCNC: 10.4 MMOL/L
AST SERPL-CCNC: 15 U/L (ref 1–32)
BASOPHILS # BLD AUTO: 0.01 10*3/MM3 (ref 0–0.2)
BASOPHILS NFR BLD AUTO: 0.1 % (ref 0–1.5)
BILIRUB SERPL-MCNC: 0.5 MG/DL (ref 0.2–1.2)
BILIRUB UR QL STRIP: NEGATIVE
BUN BLD-MCNC: 9 MG/DL (ref 6–20)
BUN/CREAT SERPL: 12.7 (ref 7–25)
CALCIUM SPEC-SCNC: 9.4 MG/DL (ref 8.6–10.5)
CHLORIDE SERPL-SCNC: 102 MMOL/L (ref 98–107)
CLARITY UR: ABNORMAL
CO2 SERPL-SCNC: 25.6 MMOL/L (ref 22–29)
COLOR UR: YELLOW
CREAT BLD-MCNC: 0.71 MG/DL (ref 0.57–1)
DEPRECATED RDW RBC AUTO: 43.3 FL (ref 37–54)
EOSINOPHIL # BLD AUTO: 0.33 10*3/MM3 (ref 0–0.4)
EOSINOPHIL NFR BLD AUTO: 2.2 % (ref 0.3–6.2)
ERYTHROCYTE [DISTWIDTH] IN BLOOD BY AUTOMATED COUNT: 12.8 % (ref 12.3–15.4)
GFR SERPL CREATININE-BSD FRML MDRD: 93 ML/MIN/1.73
GLOBULIN UR ELPH-MCNC: 3.3 GM/DL
GLUCOSE BLD-MCNC: 91 MG/DL (ref 65–99)
GLUCOSE UR STRIP-MCNC: NEGATIVE MG/DL
HCT VFR BLD AUTO: 41.8 % (ref 34–46.6)
HGB BLD-MCNC: 14 G/DL (ref 12–15.9)
HGB UR QL STRIP.AUTO: NEGATIVE
KETONES UR QL STRIP: ABNORMAL
LEUKOCYTE ESTERASE UR QL STRIP.AUTO: NEGATIVE
LYMPHOCYTES # BLD AUTO: 2.04 10*3/MM3 (ref 0.7–3.1)
LYMPHOCYTES NFR BLD AUTO: 13.4 % (ref 19.6–45.3)
MCH RBC QN AUTO: 31.8 PG (ref 26.6–33)
MCHC RBC AUTO-ENTMCNC: 33.5 G/DL (ref 31.5–35.7)
MCV RBC AUTO: 95 FL (ref 79–97)
MONOCYTES # BLD AUTO: 1.07 10*3/MM3 (ref 0.1–0.9)
MONOCYTES NFR BLD AUTO: 7 % (ref 5–12)
NEUTROPHILS # BLD AUTO: 11.75 10*3/MM3 (ref 1.7–7)
NEUTROPHILS NFR BLD AUTO: 77.3 % (ref 42.7–76)
NITRITE UR QL STRIP: NEGATIVE
PH UR STRIP.AUTO: 6 [PH] (ref 5–8)
PLATELET # BLD AUTO: 319 10*3/MM3 (ref 140–450)
PMV BLD AUTO: 9.6 FL (ref 6–12)
POTASSIUM BLD-SCNC: 4.3 MMOL/L (ref 3.5–5.2)
PROT SERPL-MCNC: 7.4 G/DL (ref 6–8.5)
PROT UR QL STRIP: ABNORMAL
RBC # BLD AUTO: 4.4 10*6/MM3 (ref 3.77–5.28)
SODIUM BLD-SCNC: 138 MMOL/L (ref 136–145)
SP GR UR STRIP: 1.02 (ref 1–1.03)
UROBILINOGEN UR QL STRIP: ABNORMAL
WBC NRBC COR # BLD: 15.2 10*3/MM3 (ref 3.4–10.8)

## 2019-05-13 PROCEDURE — 85025 COMPLETE CBC W/AUTO DIFF WBC: CPT | Performed by: NURSE PRACTITIONER

## 2019-05-13 PROCEDURE — 99214 OFFICE O/P EST MOD 30 MIN: CPT | Performed by: NURSE PRACTITIONER

## 2019-05-13 PROCEDURE — 80053 COMPREHEN METABOLIC PANEL: CPT | Performed by: NURSE PRACTITIONER

## 2019-05-13 PROCEDURE — 81003 URINALYSIS AUTO W/O SCOPE: CPT | Performed by: NURSE PRACTITIONER

## 2019-05-13 PROCEDURE — 36415 COLL VENOUS BLD VENIPUNCTURE: CPT | Performed by: NURSE PRACTITIONER

## 2019-05-13 RX ORDER — VALACYCLOVIR HYDROCHLORIDE 500 MG/1
500 TABLET, FILM COATED ORAL DAILY
Refills: 0 | COMMUNITY
Start: 2019-05-10

## 2019-05-13 RX ORDER — DULOXETINE 40 MG/1
2 CAPSULE, DELAYED RELEASE ORAL DAILY
Refills: 3 | COMMUNITY
Start: 2019-05-08 | End: 2020-01-07 | Stop reason: SDUPTHER

## 2019-05-13 RX ORDER — ONDANSETRON 4 MG/1
4 TABLET, FILM COATED ORAL EVERY 8 HOURS PRN
Qty: 30 TABLET | Refills: 0 | Status: SHIPPED | OUTPATIENT
Start: 2019-05-13 | End: 2019-09-09

## 2019-05-13 NOTE — PROGRESS NOTES
Subjective   Marycarmen Gauthier is a 34 y.o. female.     No recent air travel. Her son was sick last weekend (ear infection-vomiting, diarrhea, fever). She reports on Saturday morning, she started with belching, then vomiting. On Friday night her family and her made home made pizzas. She rested. She has vomited three times since then. She started with diarrhea on Saturday (two episodes). She has had hot and cold flashes. No fever. No vision changes. She is having abdominal cramps, intermittent. It is relieved with vomiting.  She started with headache yesterday. Her appetite has been decreased. She has not been drinking caffeine  (coke -2 day) since Saturday. She does have her gallbladder. She denies any urinary symptoms.       Vomiting    This is a new problem. The current episode started in the past 7 days. The problem occurs 2 to 4 times per day. The problem has been unchanged. There has been no fever. Associated symptoms include abdominal pain, arthralgias, chills, diarrhea, headaches and myalgias. Pertinent negatives include no chest pain, coughing, dizziness, fever, sweats, URI or weight loss. Risk factors include ill contacts. She has tried nothing for the symptoms.   Diarrhea    This is a new problem. The current episode started in the past 7 days. The problem occurs 2 to 4 times per day. The problem has been unchanged. The stool consistency is described as watery. The patient states that diarrhea does not awaken her from sleep. Associated symptoms include abdominal pain, arthralgias, bloating, chills, headaches, increased flatus, myalgias and vomiting. Pertinent negatives include no coughing, fever, sweats, URI or weight loss. Risk factors include ill contacts. She has tried nothing for the symptoms.   Headache    This is a new problem. The current episode started in the past 7 days. The problem occurs constantly. The pain is located in the frontal region. The pain does not radiate. The pain quality is not  similar to prior headaches. Quality: sharp. Associated symptoms include abdominal pain, phonophobia, sinus pressure (maxillary) and vomiting. Pertinent negatives include no coughing, dizziness, fever, photophobia or weight loss. Exacerbated by: loud noises  She has tried nothing for the symptoms. Her past medical history is significant for sinus disease. There is no history of migraine headaches or recent head traumas.        The following portions of the patient's history were reviewed and updated as appropriate: allergies, current medications, past family history, past medical history, past social history, past surgical history and problem list.    Review of Systems   Constitutional: Positive for chills. Negative for activity change, appetite change, fatigue, fever and weight loss.   HENT: Positive for sinus pressure (maxillary).    Eyes: Negative for photophobia.   Respiratory: Negative for cough, shortness of breath and wheezing.    Cardiovascular: Negative for chest pain, palpitations and leg swelling.   Gastrointestinal: Positive for abdominal pain, bloating, diarrhea, flatus and vomiting.   Musculoskeletal: Positive for arthralgias and myalgias.   Neurological: Positive for headaches. Negative for dizziness.       Objective   Physical Exam   Constitutional: She is oriented to person, place, and time. She appears well-developed and well-nourished.   HENT:   Head: Normocephalic.   Nose: Nose normal.   Cardiovascular: Regular rhythm and normal heart sounds. Exam reveals no S3 and no S4.   No murmur heard.  Pulmonary/Chest: Effort normal and breath sounds normal. She has no decreased breath sounds. She has no wheezes. She has no rhonchi. She has no rales.   Abdominal: Normal appearance and bowel sounds are normal. There is tenderness in the left upper quadrant and left lower quadrant. There is no CVA tenderness.   Musculoskeletal: She exhibits no edema.   Neurological: She is alert and oriented to person, place,  and time. Gait normal.   Skin: Skin is warm and dry.   Psychiatric: She has a normal mood and affect.       Assessment/Plan   Marycarmen was seen today for vomiting, diarrhea and headache.    Diagnoses and all orders for this visit:    Non-intractable vomiting with nausea, unspecified vomiting type  -     Comprehensive Metabolic Panel; Future  -     CBC Auto Differential; Future  -     Urinalysis With Microscopic If Indicated (No Culture) - Urine, Clean Catch; Future  -     ondansetron (ZOFRAN) 4 MG tablet; Take 1 tablet by mouth Every 8 (Eight) Hours As Needed for Nausea or Vomiting.  -     esomeprazole (NEXIUM 24HR) 20 MG capsule; Take 1 capsule by mouth Every Morning Before Breakfast.  -     Comprehensive Metabolic Panel  -     CBC Auto Differential  -     Urinalysis With Microscopic If Indicated (No Culture) - Urine, Clean Catch    Diarrhea, unspecified type  Comments:  may pepto     Nonintractable headache, unspecified chronicity pattern, unspecified headache type  Comments:  r/t caffiene     Pain of upper abdomen  -     US Gallbladder; Future    She is accompanied by her mom. Questionable gastroenteritis, cholecystitis. Will check labs and if abnormal will obtain imaging of abd. She was advised clear liquids and advance to bland as tolerated. She was advised If any worsening of symptoms to go to ER.   Her WBC is elevated. Will obtain US gallbladder. Patient informed via telephone.

## 2019-05-13 NOTE — PATIENT INSTRUCTIONS
Viral Gastroenteritis, Adult  Viral gastroenteritis is also known as the stomach flu. This condition is caused by certain germs (viruses). These germs can be passed from person to person very easily (are very contagious). This condition can cause sudden watery poop (diarrhea), fever, and throwing up (vomiting).  Having watery poop and throwing up can make you feel weak and cause you to get dehydrated. Dehydration can make you tired and thirsty, make you have a dry mouth, and make it so you pee (urinate) less often. Older adults and people with other diseases or a weak defense system (immune system) are at higher risk for dehydration. It is important to replace the fluids that you lose from having watery poop and throwing up.  Follow these instructions at home:  Follow instructions from your doctor about how to care for yourself at home.  Eating and drinking  Follow these instructions as told by your doctor:  · Take an oral rehydration solution (ORS). This is a drink that is sold at pharmacies and stores.  · Drink clear fluids in small amounts as you are able, such as:  ? Water.  ? Ice chips.  ? Diluted fruit juice.  ? Low-calorie sports drinks.  · Eat bland, easy-to-digest foods in small amounts as you are able, such as:  ? Bananas.  ? Applesauce.  ? Rice.  ? Low-fat (lean) meats.  ? Toast.  ? Crackers.  · Avoid fluids that have a lot of sugar or caffeine in them.  · Avoid alcohol.  · Avoid spicy or fatty foods.    General instructions  · Drink enough fluid to keep your pee (urine) clear or pale yellow.  · Wash your hands often. If you cannot use soap and water, use hand .  · Make sure that all people in your home wash their hands well and often.  · Rest at home while you get better.  · Take over-the-counter and prescription medicines only as told by your doctor.  · Watch your condition for any changes.  · Take a warm bath to help with any burning or pain from having watery poop.  · Keep all follow-up  visits as told by your doctor. This is important.  Contact a doctor if:  · You cannot keep fluids down.  · Your symptoms get worse.  · You have new symptoms.  · You feel light-headed or dizzy.  · You have muscle cramps.  Get help right away if:  · You have chest pain.  · You feel very weak or you pass out (faint).  · You see blood in your throw-up.  · Your throw-up looks like coffee grounds.  · You have bloody or black poop (stools) or poop that look like tar.  · You have a very bad headache, a stiff neck, or both.  · You have a rash.  · You have very bad pain, cramping, or bloating in your belly (abdomen).  · You have trouble breathing.  · You are breathing very quickly.  · Your heart is beating very quickly.  · Your skin feels cold and clammy.  · You feel confused.  · You have pain when you pee.  · You have signs of dehydration, such as:  ? Dark pee, hardly any pee, or no pee.  ? Cracked lips.  ? Dry mouth.  ? Sunken eyes.  ? Sleepiness.  ? Weakness.  This information is not intended to replace advice given to you by your health care provider. Make sure you discuss any questions you have with your health care provider.  Document Released: 06/05/2009 Document Revised: 02/16/2018 Document Reviewed: 08/23/2016  [employer] Patient Education © 2019 [employer]

## 2019-05-14 ENCOUNTER — HOSPITAL ENCOUNTER (OUTPATIENT)
Dept: ULTRASOUND IMAGING | Facility: HOSPITAL | Age: 34
Discharge: HOME | End: 2019-05-14
Admitting: NURSE PRACTITIONER

## 2019-05-14 DIAGNOSIS — R10.10 PAIN OF UPPER ABDOMEN: ICD-10-CM

## 2019-05-14 PROCEDURE — 76705 ECHO EXAM OF ABDOMEN: CPT

## 2019-05-20 ENCOUNTER — OFFICE VISIT (OUTPATIENT)
Dept: INTERNAL MEDICINE | Facility: CLINIC | Age: 34
End: 2019-05-20

## 2019-05-20 VITALS
BODY MASS INDEX: 31.64 KG/M2 | DIASTOLIC BLOOD PRESSURE: 72 MMHG | OXYGEN SATURATION: 99 % | HEART RATE: 93 BPM | SYSTOLIC BLOOD PRESSURE: 112 MMHG | WEIGHT: 162 LBS

## 2019-05-20 DIAGNOSIS — R11.2 NON-INTRACTABLE VOMITING WITH NAUSEA, UNSPECIFIED VOMITING TYPE: ICD-10-CM

## 2019-05-20 DIAGNOSIS — R19.7 DIARRHEA, UNSPECIFIED TYPE: ICD-10-CM

## 2019-05-20 DIAGNOSIS — D72.829 LEUKOCYTOSIS, UNSPECIFIED TYPE: Primary | ICD-10-CM

## 2019-05-20 LAB
BASOPHILS # BLD AUTO: 0.02 10*3/MM3 (ref 0–0.2)
BASOPHILS NFR BLD AUTO: 0.2 % (ref 0–1.5)
DEPRECATED RDW RBC AUTO: 42.3 FL (ref 37–54)
EOSINOPHIL # BLD AUTO: 0.17 10*3/MM3 (ref 0–0.4)
EOSINOPHIL NFR BLD AUTO: 1.9 % (ref 0.3–6.2)
ERYTHROCYTE [DISTWIDTH] IN BLOOD BY AUTOMATED COUNT: 12.5 % (ref 12.3–15.4)
HCT VFR BLD AUTO: 39.4 % (ref 34–46.6)
HGB BLD-MCNC: 13.1 G/DL (ref 12–15.9)
LYMPHOCYTES # BLD AUTO: 2.17 10*3/MM3 (ref 0.7–3.1)
LYMPHOCYTES NFR BLD AUTO: 24.5 % (ref 19.6–45.3)
MCH RBC QN AUTO: 31.8 PG (ref 26.6–33)
MCHC RBC AUTO-ENTMCNC: 33.2 G/DL (ref 31.5–35.7)
MCV RBC AUTO: 95.6 FL (ref 79–97)
MONOCYTES # BLD AUTO: 0.67 10*3/MM3 (ref 0.1–0.9)
MONOCYTES NFR BLD AUTO: 7.6 % (ref 5–12)
NEUTROPHILS # BLD AUTO: 5.82 10*3/MM3 (ref 1.7–7)
NEUTROPHILS NFR BLD AUTO: 65.8 % (ref 42.7–76)
PLATELET # BLD AUTO: 350 10*3/MM3 (ref 140–450)
PMV BLD AUTO: 9.7 FL (ref 6–12)
RBC # BLD AUTO: 4.12 10*6/MM3 (ref 3.77–5.28)
WBC NRBC COR # BLD: 8.85 10*3/MM3 (ref 3.4–10.8)

## 2019-05-20 PROCEDURE — 85025 COMPLETE CBC W/AUTO DIFF WBC: CPT | Performed by: NURSE PRACTITIONER

## 2019-05-20 PROCEDURE — 99213 OFFICE O/P EST LOW 20 MIN: CPT | Performed by: NURSE PRACTITIONER

## 2019-05-20 NOTE — PROGRESS NOTES
Subjective   Marycarmen Gauthier is a 34 y.o. female.     She presents for 1 wk f/u for nausea, vomiting and diarrhea-- possible cholecystitis or gastroenteritis. This is a new problem to me. Her sx have significantly improved with bland diet, nexium and zofran. Gallbladder U/S was negative for acute findings. She has an extensive medical history that includes chronic nausea, POTS, tachycardia, leukocytosis, and autonomic dysfunction.      Vomiting    This is a new problem. The current episode started 1 to 4 weeks ago. The problem has been resolved. There has been no fever. Associated symptoms include abdominal pain (improved) and URI (r/t allergies). Pertinent negatives include no chest pain, diarrhea or fever. Treatments tried: nexium, zofran, bland diet. The treatment provided significant relief.   Nausea   This is a chronic problem. The problem has been rapidly improving. Associated symptoms include abdominal pain (improved), fatigue, nausea and vomiting (resolved). Pertinent negatives include no chest pain or fever. Treatments tried: zofran, nexium, bland diet. The treatment provided significant relief.        The following portions of the patient's history were reviewed and updated as appropriate: allergies, current medications, past family history, past medical history, past social history, past surgical history and problem list.    Review of Systems   Constitutional: Positive for fatigue. Negative for fever.   HENT: Positive for postnasal drip and rhinorrhea.    Respiratory: Negative for shortness of breath.    Cardiovascular: Negative for chest pain, palpitations and leg swelling.   Gastrointestinal: Positive for abdominal pain (improved), nausea and vomiting (resolved). Negative for abdominal distention, constipation and diarrhea.   Allergic/Immunologic: Positive for environmental allergies.       Objective   Physical Exam   Constitutional: She appears well-developed and well-nourished.   HENT:   Head:  Normocephalic and atraumatic.   Cardiovascular: Normal rate, regular rhythm and normal heart sounds.   No murmur heard.  Pulmonary/Chest: Effort normal and breath sounds normal. No respiratory distress.   Abdominal: Soft. Bowel sounds are normal. She exhibits no distension and no mass. There is no hepatosplenomegaly. There is tenderness in the right upper quadrant, right lower quadrant, epigastric area and left upper quadrant. There is no rebound and no guarding.   Neurological: She is alert.   Psychiatric: She has a normal mood and affect. Her behavior is normal.   Vitals reviewed.      Assessment/Plan   Marycarmen was seen today for vomiting and nausea.    Diagnoses and all orders for this visit:    Leukocytosis, unspecified type  -     CBC Auto Differential    Diarrhea, unspecified type  Comments:  resolved    Non-intractable vomiting with nausea, unspecified vomiting type  Comments:  resolved    Continue using zofran and nexium. Since sx have improved significantly, HIDA scan not needed at this time. If sx develop once more will consider HIDA scan.     WBC were 15.2 one week ago, rechecking today to see if they have decreased. Pt does have a history of leukocytosis and has been seen by hematologist.

## 2019-05-22 ENCOUNTER — OFFICE VISIT (OUTPATIENT)
Dept: CARDIOLOGY | Facility: CLINIC | Age: 34
End: 2019-05-22

## 2019-05-22 VITALS
SYSTOLIC BLOOD PRESSURE: 114 MMHG | BODY MASS INDEX: 31.77 KG/M2 | HEIGHT: 60 IN | WEIGHT: 161.8 LBS | HEART RATE: 100 BPM | DIASTOLIC BLOOD PRESSURE: 70 MMHG

## 2019-05-22 DIAGNOSIS — G90.A POTS (POSTURAL ORTHOSTATIC TACHYCARDIA SYNDROME): ICD-10-CM

## 2019-05-22 DIAGNOSIS — R53.82 CHRONIC FATIGUE: ICD-10-CM

## 2019-05-22 DIAGNOSIS — R00.0 TACHYCARDIA: Primary | ICD-10-CM

## 2019-05-22 DIAGNOSIS — R06.09 DOE (DYSPNEA ON EXERTION): ICD-10-CM

## 2019-05-22 DIAGNOSIS — I10 ESSENTIAL HYPERTENSION: ICD-10-CM

## 2019-05-22 PROBLEM — M54.50 LOW BACK PAIN: Status: RESOLVED | Noted: 2018-07-09 | Resolved: 2019-05-22

## 2019-05-22 PROBLEM — R14.0 ABDOMINAL DISTENSION: Status: RESOLVED | Noted: 2019-01-16 | Resolved: 2019-05-22

## 2019-05-22 PROBLEM — R00.2 HEART PALPITATIONS: Status: RESOLVED | Noted: 2017-08-07 | Resolved: 2019-05-22

## 2019-05-22 PROBLEM — Z90.710 H/O ABDOMINAL HYSTERECTOMY: Status: RESOLVED | Noted: 2018-12-27 | Resolved: 2019-05-22

## 2019-05-22 PROCEDURE — 99214 OFFICE O/P EST MOD 30 MIN: CPT | Performed by: NURSE PRACTITIONER

## 2019-05-22 PROCEDURE — 93000 ELECTROCARDIOGRAM COMPLETE: CPT | Performed by: NURSE PRACTITIONER

## 2019-05-22 RX ORDER — NICOTINE POLACRILEX 4 MG/1
40 GUM, CHEWING ORAL DAILY
COMMUNITY

## 2019-05-22 NOTE — PROGRESS NOTES
Date of Office Visit: 2019  Encounter Provider: MAGGI Sue  Place of Service: [facility] CARDIOLOGY  Patient Name: Marycarmen Gauthier  :1985      Chief Complaint   Patient presents with   • POTS (postural orthostatic tachycardia syndrome)   :     Dear Dr. Gene Diaz,     HPI: Marycarmen Gauthier is a pleasant 34 y.o. female who presents today for cardiac follow up. She is a new patient to me and her previous records have been reviewed. She has been diagnosed with hyper mobility of joints, fibromyalgia, endometriosis, and posttraumatic stress disorder.    She has been evaluated in the past for tachycardia.  On 2017 she had a tilt table study completed which showed inappropriate sinus tachycardia, but no evidence of vasovagal syncope.  She had an echocardiogram in 2017 which showed normal ejection fraction and no valvular disease.    She was evaluated at the [facility] 2018 for POTS.  Tilt table study showed postural tachycardia and no peripheral autonomic neuropathy.  She was felt to have postural orthostatic tachycardic syndrome and recommended exercise, compression stockings, 3-5 g of sodium daily, 2.5 L of fluid, and consideration of beta-blockers.    She was last seen in the office 2018 by Dr. Hardin.  She told her at that time that she had been diagnosed with Lyme disease.  Dr. Hardin stopped her atenolol and amlodipine.  She recommended that she monitor her blood pressures at home with a target goal of less than 120/80.    She presents today for six-month follow-up.  She had a hysterectomy back in 2018 and really has not felt well since then.  She says she has had multiple infections, blurred vision, chronic nausea, possible gallbladder issues, and fatigue.  As far as her POTS syndrome she experiences dyspnea with exertion, tachycardia, and dizziness in which she frequently needs to lie down.  Her blood  pressure is in normal range today and heart rate at 100 bpm.  She denies chest pain or syncope.    Past Medical History:   Diagnosis Date   • Anxiety    • Asthma    • Autonomic dysfunction    • Bulging lumbar disc    • Chronic pain    • Chronic sinus infection    • Depression    • Dizziness    • Essential hypertension    • Fatigue    • Fibromyalgia    • Flexor hallucis longus tendinitis 2013   • H/O Bilateral carpal tunnel syndrome    • H/O transfusion of packed red blood cells    • History of abnormal cervical Pap smear    • History of anemia    • History of infectious mononucleosis    • History of tachycardia     POTS   • Hyperlipidemia    • Hypermobile joint syndrome of multiple sites    • Hypermobile joints    • Leukocytosis    • Numbness and tingling in both hands    • Obesity    • Osteoarthritis    • Palpitations    • POTS (postural orthostatic tachycardia syndrome) 2017   • PROM (premature rupture of membranes)    • PTSD (post-traumatic stress disorder) 2014   • Seasonal allergies    • Snoring    • SOB (shortness of breath)    • Syncope and collapse        Past Surgical History:   Procedure Laterality Date   • ANKLE SURGERY Right 2014    FHL tendon release   • BREAST SURGERY  10/15/2013    Reduction   • CARPAL TUNNEL RELEASE Right 2017    REVISE MEDIAN N/CARPAL TUNNEL SURG   •  SECTION     • HYSTERECTOMY  2018   • ROTATOR CUFF REPAIR Right    • SINUS SURGERY  2005   • WISDOM TOOTH EXTRACTION         Social History     Socioeconomic History   • Marital status:      Spouse name: Roe   • Number of children: 1   • Years of education: College   • Highest education level: Not on file   Occupational History   • Occupation: RN     Employer: [employer]   Tobacco Use   • Smoking status: Never Smoker   • Smokeless tobacco: Never Used   • Tobacco comment: CAFFEINE USE   Substance and Sexual Activity   • Alcohol use: Yes     Alcohol/week: 0.6 oz     Types: 1  Standard drinks or equivalent per week     Frequency: Never     Comment: occasional   • Drug use: No   • Sexual activity: Yes     Partners: Male     Birth control/protection: Condom, None       Family History   Problem Relation Age of Onset   • Hypertension Mother    • Other Mother         Lipids   • Obesity Mother    • Cancer Father 61        Non-Hodgkin's Lymphoma   • Obesity Father    • Cancer Sister 27        Hodgkin's Lymphoma   • Depression Sister    • Obesity Sister    • Colon cancer Maternal Grandmother    • Hypertension Maternal Grandmother    • Stroke Maternal Grandmother    • Heart disease Maternal Grandmother    • Depression Maternal Grandmother    • Heart disease Paternal Grandfather    • Hyperlipidemia Paternal Grandfather    • Sudden death Maternal Grandfather    • Aneurysm Maternal Grandfather    • Anemia Paternal Grandmother    • Depression Paternal Grandmother    • Clotting disorder Neg Hx    • Diabetes Neg Hx        The following portion of the patient's history were reviewed and updated as appropriate: past medical history, past surgical history, past social history, past family history, allergies, current medications, and problem list.    Review of Systems   Constitution: Positive for malaise/fatigue. Negative for chills, diaphoresis, fever, night sweats, weight gain and weight loss.   HENT: Negative for hearing loss, nosebleeds, sore throat and tinnitus.    Eyes: Positive for blurred vision and double vision. Negative for pain and visual disturbance.   Cardiovascular: Positive for dyspnea on exertion. Negative for chest pain, claudication, cyanosis, irregular heartbeat, leg swelling, near-syncope, orthopnea, palpitations, paroxysmal nocturnal dyspnea and syncope.   Respiratory: Positive for cough and shortness of breath. Negative for hemoptysis, snoring and wheezing.    Endocrine: Positive for cold intolerance and heat intolerance. Negative for polyuria.   Hematologic/Lymphatic: Negative for  "bleeding problem. Does not bruise/bleed easily.   Skin: Positive for poor wound healing. Negative for color change, dry skin, flushing and itching.   Musculoskeletal: Positive for joint pain. Negative for falls, joint swelling, muscle cramps, muscle weakness and myalgias.   Gastrointestinal: Positive for abdominal pain, nausea and vomiting. Negative for constipation, heartburn and melena.   Genitourinary: Positive for decreased libido. Negative for dysuria and hematuria.   Neurological: Positive for dizziness, light-headedness and numbness. Negative for excessive daytime sleepiness, loss of balance, paresthesias, seizures and vertigo.   Psychiatric/Behavioral: Positive for depression. Negative for altered mental status, memory loss and substance abuse. The patient is nervous/anxious. The patient does not have insomnia.    Allergic/Immunologic: Negative for environmental allergies.       Allergies   Allergen Reactions   • Diphenhydramine Other (See Comments)     Agitated, and restless   • Latex Rash   • Sulfamethoxazole-Trimethoprim Itching and Rash         Current Outpatient Medications:   •  7-Keto DHEA powder, , Disp: , Rfl:   •  acetaminophen (TYLENOL) 650 MG 8 hr tablet, Take 650 mg by mouth As Needed for Mild Pain ., Disp: , Rfl:   •  Acetylcysteine ( PO), , Disp: , Rfl:   •  ALBUTEROL SULFATE HFA IN, Inhale., Disp: , Rfl:   •  Alpha-Lipoic Acid 100 MG capsule, Take  by mouth 2 (Two) Times a Day., Disp: , Rfl:   •  ASHWAGANDHA PO, Take 450 mg by mouth Daily., Disp: , Rfl:   •  BD INSULIN SYRINGE U/F 31G X 5/16\" 0.5 ML misc, , Disp: , Rfl:   •  Boric Acid powder, Insert 600 mg into the vagina every night at bedtime., Disp: , Rfl:   •  Charcoal 260 MG capsule, Take  by mouth 2 (Two) Times a Day., Disp: , Rfl:   •  Cholecalciferol (VITAMIN D-3) 1000 units capsule, Take 1,000 Units by mouth Daily., Disp: , Rfl:   •  Cobamamide (ADENOSYLCOBALAMIN) powder, , Disp: , Rfl:   •  Cream Base (LIPOSOMAL HEAVY EX), " Take 1,000 mg by mouth 2 (Two) Times a Day., Disp: , Rfl:   •  Cyanocobalamin (VITAMIN B-12 PO), Take  by mouth. b12 adenosylocobalamin 300 ug with folic acid logenges 1 in the morning, Disp: , Rfl:   •  cycloSPORINE (RESTASIS) 0.05 % ophthalmic emulsion, 1 drop 2 (Two) Times a Day., Disp: , Rfl:   •  diclofenac (VOLTAREN) 1 % gel gel, Apply 4 g topically to the appropriate area as directed 4 (Four) Times a Day As Needed., Disp: , Rfl:   •  DULoxetine HCl 40 MG capsule delayed-release particles, Take 2 capsules by mouth Daily., Disp: , Rfl: 3  •  Fexofenadine HCl (ALLEGRA ALLERGY PO), Take 180 mg by mouth As Needed., Disp: , Rfl:   •  Ibuprofen (ADVIL PO), Take 1 tablet by mouth Daily., Disp: , Rfl:   •  L-THEANINE PO, Take 200 mg by mouth 2 (Two) Times a Day., Disp: , Rfl:   •  LYRICA 100 MG capsule, Take 100 mg by mouth 3 (Three) Times a Day., Disp: , Rfl: 1  •  Magnesium 125 MG capsule, Take 125 mg by mouth 2 (Two) Times a Day., Disp: , Rfl:   •  Multiple Vitamin (MULTI-VITAMIN DAILY PO), Take 1 tablet by mouth Daily., Disp: , Rfl:   •  Omega-3 Fatty Acids (OMEGA-3 FISH OIL PO), Take 1 tablet by mouth Daily., Disp: , Rfl:   •  omeprazole (priLOSEC) 40 MG capsule, Take 40 mg by mouth 2 (Two) Times a Day., Disp: , Rfl:   •  ondansetron (ZOFRAN) 4 MG tablet, Take 1 tablet by mouth Every 8 (Eight) Hours As Needed for Nausea or Vomiting., Disp: 30 tablet, Rfl: 0  •  oxyCODONE-acetaminophen (PERCOCET) 5-325 MG per tablet, Take 1 tablet by mouth Every 8 (Eight) Hours As Needed for Severe Pain ., Disp: 90 tablet, Rfl: 0  •  Polyvinyl Alcohol-Povidone (REFRESH OP), Apply  to eye(s) as directed by provider 2 (Two) Times a Day., Disp: , Rfl:   •  Probiotic Product (PROBIOTIC-10 ULTIMATE PO), , Disp: , Rfl:   •  Semaglutide (OZEMPIC) 1 MG/DOSE solution pen-injector, , Disp: , Rfl:   •  Sennosides-Docusate Sodium (ONEL-COLACE PO), Take  by mouth As Needed., Disp: , Rfl:   •  valACYclovir (VALTREX) 500 MG tablet, TAKE 1 (ONE)  "TABLET TWICE A DAY, Disp: , Rfl: 0  •  esomeprazole (NEXIUM 24HR) 20 MG capsule, Take 1 capsule by mouth Every Morning Before Breakfast., Disp: 7 capsule, Rfl: 0  •  Pregabalin (LYRICA PO), Take 75 mg by mouth 2 (Two) Times a Day., Disp: , Rfl:   •  vitamin C (ASCORBIC ACID) 250 MG tablet, Take 1,000 mg by mouth Daily., Disp: , Rfl:         Objective:     Vitals:    05/22/19 1105   BP: 114/70   BP Location: Left arm   Pulse: 100   Weight: 73.4 kg (161 lb 12.8 oz)   Height: 152.4 cm (60\")     Body mass index is 31.6 kg/m².    PHYSICAL EXAM:    Vitals Reviewed.   General Appearance: No acute distress, well developed and well nourished.   Eyes: Conjunctiva and lids: No erythema, swelling, or discharge. Sclera non-icteric.   HENT: Atraumatic, normocephalic. External eyes, ears, and nose normal. No hearing loss noted. Mucous membranes normal. Lips not cyanotic. Neck supple with no tenderness.  Respiratory: No signs of respiratory distress. Respiration rhythm and depth normal.   Clear to auscultation. No rales, crackles, rhonchi, or wheezing auscultated.   Cardiovascular:  Jugular Venous Pressure: Normal  Heart Rate and Rhythm: Normal rhythm; tachycardic.  Heart Sounds: Normal S1 and S2. No S3 or S4 noted.  Murmurs: No murmurs noted. No rubs, thrills, or gallops.   Arterial Pulses: Carotid pulses normal. No carotid bruit noted. Posterior tibialis and dorsalis pedis pulses normal.   Lower Extremities: No edema noted.  Gastrointestinal:  Abdomen soft, non-distended, non-tender. Normal bowel sounds. No hepatomegaly.   Musculoskeletal: Normal movement of extremities  Skin and Nails: General appearance normal. No pallor, cyanosis, diaphoresis. Skin temperature normal. No clubbing of fingernails.   Psychiatric: Patient alert and oriented to person, place, and time. Speech and behavior appropriate. Normal mood and affect.       ECG 12 Lead  Date/Time: 5/22/2019 11:09 AM  Performed by: Francy Saldaña APRN  Authorized by: " Francy Saldaña APRN   Comparison: compared with previous ECG from 11/20/2018  Comparison to previous ECG: Normal sinus rhythm, heart rate 92  Rhythm: sinus tachycardia  Rate: tachycardic  BPM: 100  ST Segments: ST segments normal  T Waves: T waves normal  QRS axis: normal    Clinical impression: non-specific ECG              Assessment:       Diagnosis Plan   1. Tachycardia     2. POTS (postural orthostatic tachycardia syndrome)     3. Chronic fatigue     4. VICKERS (dyspnea on exertion)     5. Essential hypertension            Plan:       1/2.  Tachycardia/POTS: Her heart rate today is 100 bpm and she is aware of her heart racing on occasion.  I discussed the plan of care with Dr. Jazz Hardin.  I provided patient the algorithm for treatment of POTS syndrome including the exercise regimen which Dr. Hardin feels is very important in treating this.  We also discussed starting Corlanor.  Mrs. Gauthier would like to try the natural treatment of pots syndrome with this algorithm including increasing her exercise. She also asked for referral to [facility] and the referral has been placed.    3/4.  Chronic Fatigue and Dyspnea: I feel that this is multifactorial.    5.  Hypertension: Blood pressure is under good control.    6.  I have placed a referral to the [facility] and the proper paperwork will be completed.    7.  I have recommended follow-up with Dr. Hardin in 3 months, unless otherwise needed sooner.    As always, it has been a pleasure to participate in your patient's care. Thank you.       Sincerely,         MAGGI Arora        **Dragon Disclaimer:**  Much of this encounter note is an electronic transcription/translation of spoken language to printed text. The electronic translation of spoken language may permit erroneous, or at times, nonsensical words or phrases to be inadvertently transcribed. Although I have reviewed the note for such  errors, some may still exist.

## 2019-05-23 ENCOUNTER — TELEPHONE (OUTPATIENT)
Dept: INTERNAL MEDICINE | Facility: CLINIC | Age: 34
End: 2019-05-23

## 2019-05-23 DIAGNOSIS — R10.10 PAIN OF UPPER ABDOMEN: Primary | ICD-10-CM

## 2019-05-23 NOTE — TELEPHONE ENCOUNTER
----- Message from Marjan Magdaleno sent at 5/23/2019 10:17 AM EDT -----  Contact: pt - Dr CRAIG's pt - RE: return call  Pt calling and would like a return call regarding abd pain. She was seen for abd pain on Monday. She informs nausea & feeling of getting sick as well as intense pain have gotten worse. Could you please call pt to discuss? Please advise. Thanks      Pt # [phone]

## 2019-05-23 NOTE — TELEPHONE ENCOUNTER
Spoke to pt, her abdominal pain and nausea has gotten worse in the last 2 days. The pain is epigastric. She is also bloated and now constipated. No bloody stools reported. She has a h/o of constipation and has used miralax as recently as last month. I told her to use miralax once again to help with the constipation which could also possibly help her nausea. She took one Zofran last night which did not provide any relief. I encouraged her to continue using Zofran and that we could possibly switch to phernergan if the Zofran isn't helping but that it will make her sleepy. Encouraged continuation of bland diet. She has not vomited. She does not have heartburn. Educated her to go to ER if abdominal pain increases especially with a holiday weekend coming up. I ordered a HIDA scan to further evaluate her gallbladder since her gallbladder U/S was normal.

## 2019-05-24 ENCOUNTER — HOSPITAL ENCOUNTER (EMERGENCY)
Facility: HOSPITAL | Age: 34
Discharge: HOME | End: 2019-05-24
Attending: EMERGENCY MEDICINE | Admitting: EMERGENCY MEDICINE

## 2019-05-24 ENCOUNTER — APPOINTMENT (OUTPATIENT)
Dept: CT IMAGING | Facility: HOSPITAL | Age: 34
End: 2019-05-24

## 2019-05-24 VITALS
HEIGHT: 60 IN | WEIGHT: 161 LBS | RESPIRATION RATE: 18 BRPM | DIASTOLIC BLOOD PRESSURE: 83 MMHG | BODY MASS INDEX: 31.61 KG/M2 | SYSTOLIC BLOOD PRESSURE: 131 MMHG | OXYGEN SATURATION: 95 % | HEART RATE: 129 BPM | TEMPERATURE: 98.3 F

## 2019-05-24 DIAGNOSIS — R10.10 UPPER ABDOMINAL PAIN: Primary | ICD-10-CM

## 2019-05-24 LAB
ALBUMIN SERPL-MCNC: 4.5 G/DL (ref 3.5–5.2)
ALBUMIN/GLOB SERPL: 1.4 G/DL
ALP SERPL-CCNC: 97 U/L (ref 39–117)
ALT SERPL W P-5'-P-CCNC: 16 U/L (ref 1–33)
ANION GAP SERPL CALCULATED.3IONS-SCNC: 12.6 MMOL/L
AST SERPL-CCNC: 17 U/L (ref 1–32)
BASOPHILS # BLD AUTO: 0.05 10*3/MM3 (ref 0–0.2)
BASOPHILS NFR BLD AUTO: 0.4 % (ref 0–1.5)
BILIRUB SERPL-MCNC: 0.3 MG/DL (ref 0.2–1.2)
BILIRUB UR QL STRIP: NEGATIVE
BUN BLD-MCNC: 10 MG/DL (ref 6–20)
BUN/CREAT SERPL: 13.9 (ref 7–25)
CALCIUM SPEC-SCNC: 9.3 MG/DL (ref 8.6–10.5)
CHLORIDE SERPL-SCNC: 104 MMOL/L (ref 98–107)
CLARITY UR: CLEAR
CO2 SERPL-SCNC: 23.4 MMOL/L (ref 22–29)
COLOR UR: YELLOW
CREAT BLD-MCNC: 0.72 MG/DL (ref 0.57–1)
D-LACTATE SERPL-SCNC: 1 MMOL/L (ref 0.5–2)
DEPRECATED RDW RBC AUTO: 43.4 FL (ref 37–54)
EOSINOPHIL # BLD AUTO: 0.17 10*3/MM3 (ref 0–0.4)
EOSINOPHIL NFR BLD AUTO: 1.4 % (ref 0.3–6.2)
ERYTHROCYTE [DISTWIDTH] IN BLOOD BY AUTOMATED COUNT: 12.6 % (ref 12.3–15.4)
GFR SERPL CREATININE-BSD FRML MDRD: 92 ML/MIN/1.73
GLOBULIN UR ELPH-MCNC: 3.3 GM/DL
GLUCOSE BLD-MCNC: 95 MG/DL (ref 65–99)
GLUCOSE UR STRIP-MCNC: NEGATIVE MG/DL
HCT VFR BLD AUTO: 45.3 % (ref 34–46.6)
HGB BLD-MCNC: 15.1 G/DL (ref 12–15.9)
HGB UR QL STRIP.AUTO: NEGATIVE
IMM GRANULOCYTES # BLD AUTO: 0.06 10*3/MM3 (ref 0–0.05)
IMM GRANULOCYTES NFR BLD AUTO: 0.5 % (ref 0–0.5)
KETONES UR QL STRIP: NEGATIVE
LEUKOCYTE ESTERASE UR QL STRIP.AUTO: NEGATIVE
LIPASE SERPL-CCNC: 28 U/L (ref 13–60)
LYMPHOCYTES # BLD AUTO: 3.63 10*3/MM3 (ref 0.7–3.1)
LYMPHOCYTES NFR BLD AUTO: 29.2 % (ref 19.6–45.3)
MCH RBC QN AUTO: 31.6 PG (ref 26.6–33)
MCHC RBC AUTO-ENTMCNC: 33.3 G/DL (ref 31.5–35.7)
MCV RBC AUTO: 94.8 FL (ref 79–97)
MONOCYTES # BLD AUTO: 0.77 10*3/MM3 (ref 0.1–0.9)
MONOCYTES NFR BLD AUTO: 6.2 % (ref 5–12)
NEUTROPHILS # BLD AUTO: 7.77 10*3/MM3 (ref 1.7–7)
NEUTROPHILS NFR BLD AUTO: 62.3 % (ref 42.7–76)
NITRITE UR QL STRIP: NEGATIVE
NRBC BLD AUTO-RTO: 0 /100 WBC (ref 0–0.2)
PH UR STRIP.AUTO: 5.5 [PH] (ref 5–8)
PLATELET # BLD AUTO: 397 10*3/MM3 (ref 140–450)
PMV BLD AUTO: 9.1 FL (ref 6–12)
POTASSIUM BLD-SCNC: 4.1 MMOL/L (ref 3.5–5.2)
PROT SERPL-MCNC: 7.8 G/DL (ref 6–8.5)
PROT UR QL STRIP: NEGATIVE
RBC # BLD AUTO: 4.78 10*6/MM3 (ref 3.77–5.28)
SODIUM BLD-SCNC: 140 MMOL/L (ref 136–145)
SP GR UR STRIP: 1.02 (ref 1–1.03)
UROBILINOGEN UR QL STRIP: NORMAL
WBC NRBC COR # BLD: 12.45 10*3/MM3 (ref 3.4–10.8)

## 2019-05-24 PROCEDURE — 80053 COMPREHEN METABOLIC PANEL: CPT | Performed by: EMERGENCY MEDICINE

## 2019-05-24 PROCEDURE — 81003 URINALYSIS AUTO W/O SCOPE: CPT | Performed by: EMERGENCY MEDICINE

## 2019-05-24 PROCEDURE — 25010000002 PROMETHAZINE PER 50 MG: Performed by: EMERGENCY MEDICINE

## 2019-05-24 PROCEDURE — 25010000002 IOPAMIDOL 61 % SOLUTION: Performed by: EMERGENCY MEDICINE

## 2019-05-24 PROCEDURE — 96374 THER/PROPH/DIAG INJ IV PUSH: CPT

## 2019-05-24 PROCEDURE — 25010000002 MORPHINE PER 10 MG: Performed by: EMERGENCY MEDICINE

## 2019-05-24 PROCEDURE — 83690 ASSAY OF LIPASE: CPT | Performed by: EMERGENCY MEDICINE

## 2019-05-24 PROCEDURE — 96375 TX/PRO/DX INJ NEW DRUG ADDON: CPT

## 2019-05-24 PROCEDURE — 74177 CT ABD & PELVIS W/CONTRAST: CPT

## 2019-05-24 PROCEDURE — 85025 COMPLETE CBC W/AUTO DIFF WBC: CPT | Performed by: EMERGENCY MEDICINE

## 2019-05-24 PROCEDURE — 99283 EMERGENCY DEPT VISIT LOW MDM: CPT

## 2019-05-24 PROCEDURE — 83605 ASSAY OF LACTIC ACID: CPT | Performed by: EMERGENCY MEDICINE

## 2019-05-24 RX ORDER — DICYCLOMINE HYDROCHLORIDE 10 MG/1
10 CAPSULE ORAL 4 TIMES DAILY PRN
Qty: 20 CAPSULE | Refills: 0 | Status: SHIPPED | OUTPATIENT
Start: 2019-05-24 | End: 2019-06-27

## 2019-05-24 RX ORDER — PROMETHAZINE HYDROCHLORIDE 25 MG/ML
12.5 INJECTION, SOLUTION INTRAMUSCULAR; INTRAVENOUS ONCE
Status: COMPLETED | OUTPATIENT
Start: 2019-05-24 | End: 2019-05-24

## 2019-05-24 RX ORDER — PROMETHAZINE HYDROCHLORIDE 25 MG/1
25 TABLET ORAL EVERY 6 HOURS PRN
Qty: 20 TABLET | Refills: 0 | Status: SHIPPED | OUTPATIENT
Start: 2019-05-24 | End: 2019-05-31 | Stop reason: SDUPTHER

## 2019-05-24 RX ORDER — MORPHINE SULFATE 2 MG/ML
4 INJECTION, SOLUTION INTRAMUSCULAR; INTRAVENOUS ONCE
Status: COMPLETED | OUTPATIENT
Start: 2019-05-24 | End: 2019-05-24

## 2019-05-24 RX ORDER — HYDROCODONE BITARTRATE AND ACETAMINOPHEN 5; 325 MG/1; MG/1
1 TABLET ORAL EVERY 6 HOURS PRN
Qty: 12 TABLET | Refills: 0 | Status: SHIPPED | OUTPATIENT
Start: 2019-05-24 | End: 2020-01-07

## 2019-05-24 RX ADMIN — MORPHINE SULFATE 4 MG: 2 INJECTION, SOLUTION INTRAMUSCULAR; INTRAVENOUS at 11:41

## 2019-05-24 RX ADMIN — PROMETHAZINE HYDROCHLORIDE 12.5 MG: 25 INJECTION INTRAMUSCULAR; INTRAVENOUS at 11:40

## 2019-05-24 RX ADMIN — IOPAMIDOL 85 ML: 612 INJECTION, SOLUTION INTRAVENOUS at 12:22

## 2019-05-24 RX ADMIN — SODIUM CHLORIDE 1000 ML: 9 INJECTION, SOLUTION INTRAVENOUS at 11:41

## 2019-05-28 ENCOUNTER — OFFICE VISIT (OUTPATIENT)
Dept: SURGERY | Facility: CLINIC | Age: 34
End: 2019-05-28

## 2019-05-28 ENCOUNTER — TELEPHONE (OUTPATIENT)
Dept: INTERNAL MEDICINE | Facility: CLINIC | Age: 34
End: 2019-05-28

## 2019-05-28 VITALS
HEART RATE: 100 BPM | RESPIRATION RATE: 16 BRPM | HEIGHT: 60 IN | DIASTOLIC BLOOD PRESSURE: 78 MMHG | WEIGHT: 164.8 LBS | BODY MASS INDEX: 32.35 KG/M2 | SYSTOLIC BLOOD PRESSURE: 118 MMHG

## 2019-05-28 DIAGNOSIS — R10.11 RIGHT UPPER QUADRANT ABDOMINAL PAIN: Primary | ICD-10-CM

## 2019-05-28 DIAGNOSIS — R11.12 PROJECTILE VOMITING WITH NAUSEA: ICD-10-CM

## 2019-05-28 DIAGNOSIS — R10.10 PAIN OF UPPER ABDOMEN: Primary | ICD-10-CM

## 2019-05-28 DIAGNOSIS — R10.13 EPIGASTRIC PAIN: ICD-10-CM

## 2019-05-28 PROCEDURE — 99243 OFF/OP CNSLTJ NEW/EST LOW 30: CPT | Performed by: SURGERY

## 2019-05-28 NOTE — TELEPHONE ENCOUNTER
----- Message from Marjan Magdaleno sent at 5/28/2019 10:49 AM EDT -----  Contact: pt - Dr CRAIG's pt - RE: ER visit // symptoms returning  Pt calling and would like a return call regarding her still having abd pain. She was seen in ER & given Phenergan & Bentyl. She states it helped but symptoms have reappeared. Could you please call pt to discuss? Please advise. Thanks      Pt #[phone]

## 2019-05-28 NOTE — PROGRESS NOTES
Chief Complaint   Patient presents with   • Abdominal Pain     epi     HPI     Marycarmen Gauthier 34 y.o. female presents @ the req of Dr. Diaz as urgent work in for eval epi pain X 2 mos off and on, N/V, and bloating X 2 weeks.  Pt reports recent GB US and CT Abd. Patient reports she has been to the emergency room and has had 3 days in a row of nausea, vomiting epigastric pain radiating into the right side that feels like a stabbing burning sensation.  Patient denies jaundice, nikko colored stools, dark urine, fever or chills.  Patient had never had this in the past.  Patient reports that the nausea vomiting and pain is associated and made worse by eating.  Patient has been unable to eat much of anything over the last 3 to 5 days.  Nothing seems to be making it better.  Patient reports her ultrasound and CT scan are negative and she has not had a HIDA scan.    Review of Systems  General: Patient reports during good health  Eyes: No eye problems  Ears, nose, mouth and throat: No rhinitis, no hearing problems, no chronic cough  Cardiovascular/heart: Denies palpitations, syncope or chest pain  Respiratory/lung: Denies shortness of breath, hemoptysis, dyspnea on exertion   Genital/urinary: No frequency, hematuria or dysuria  Hematological/lymphatic: Denies anemia or other problems  Musculoskeletal: Positive joint and back pain  Skin: No psoriasis or other skin issues  Neurological: No seizures or other neurological problems  Psychiatric: Anxiety, PTSD  Endocrine: Negative  Gastro-intestinal: No constipation, no diarrhea, no melena, no hematochezia      Current Outpatient Medications:   •  7-Keto DHEA powder, , Disp: , Rfl:   •  acetaminophen (TYLENOL) 650 MG 8 hr tablet, Take 650 mg by mouth As Needed for Mild Pain ., Disp: , Rfl:   •  Acetylcysteine ( PO), , Disp: , Rfl:   •  ALBUTEROL SULFATE HFA IN, Inhale., Disp: , Rfl:   •  Alpha-Lipoic Acid 100 MG capsule, Take  by mouth 2 (Two) Times a Day., Disp: ,  "Rfl:   •  ASHWAGANDHA PO, Take 450 mg by mouth Daily., Disp: , Rfl:   •  BD INSULIN SYRINGE U/F 31G X 5/16\" 0.5 ML misc, , Disp: , Rfl:   •  Boric Acid powder, Insert 600 mg into the vagina every night at bedtime., Disp: , Rfl:   •  Charcoal 260 MG capsule, Take  by mouth 2 (Two) Times a Day., Disp: , Rfl:   •  Cholecalciferol (VITAMIN D-3) 1000 units capsule, Take 1,000 Units by mouth Daily., Disp: , Rfl:   •  Cobamamide (ADENOSYLCOBALAMIN) powder, , Disp: , Rfl:   •  Cream Base (LIPOSOMAL HEAVY EX), Take 1,000 mg by mouth 2 (Two) Times a Day., Disp: , Rfl:   •  Cyanocobalamin (VITAMIN B-12 PO), Take  by mouth. b12 adenosylocobalamin 300 ug with folic acid logenges 1 in the morning, Disp: , Rfl:   •  cycloSPORINE (RESTASIS) 0.05 % ophthalmic emulsion, 1 drop 2 (Two) Times a Day., Disp: , Rfl:   •  diclofenac (VOLTAREN) 1 % gel gel, Apply 4 g topically to the appropriate area as directed 4 (Four) Times a Day As Needed., Disp: , Rfl:   •  dicyclomine (BENTYL) 10 MG capsule, Take 1 capsule by mouth 4 (Four) Times a Day As Needed (pain)., Disp: 20 capsule, Rfl: 0  •  DULoxetine HCl 40 MG capsule delayed-release particles, Take 2 capsules by mouth Daily., Disp: , Rfl: 3  •  esomeprazole (NEXIUM 24HR) 20 MG capsule, Take 1 capsule by mouth Every Morning Before Breakfast., Disp: 7 capsule, Rfl: 0  •  Fexofenadine HCl (ALLEGRA ALLERGY PO), Take 180 mg by mouth As Needed., Disp: , Rfl:   •  HYDROcodone-acetaminophen (NORCO) 5-325 MG per tablet, Take 1 tablet by mouth Every 6 (Six) Hours As Needed for Moderate Pain  or Severe Pain ., Disp: 12 tablet, Rfl: 0  •  Ibuprofen (ADVIL PO), Take 1 tablet by mouth Daily., Disp: , Rfl:   •  L-THEANINE PO, Take 200 mg by mouth 2 (Two) Times a Day., Disp: , Rfl:   •  LYRICA 100 MG capsule, Take 100 mg by mouth 3 (Three) Times a Day., Disp: , Rfl: 1  •  Magnesium 125 MG capsule, Take 125 mg by mouth 2 (Two) Times a Day., Disp: , Rfl:   •  Melatonin-Theanine (MELATONIN FORTE/L-THEANINE " PO), Take 200 mg by mouth. Take two in evening, Disp: , Rfl:   •  Multiple Vitamin (MULTI-VITAMIN DAILY PO), Take 1 tablet by mouth Daily., Disp: , Rfl:   •  Omega-3 Fatty Acids (OMEGA-3 FISH OIL PO), Take 1 tablet by mouth Daily., Disp: , Rfl:   •  omeprazole (priLOSEC) 40 MG capsule, Take 40 mg by mouth 2 (Two) Times a Day., Disp: , Rfl:   •  ondansetron (ZOFRAN) 4 MG tablet, Take 1 tablet by mouth Every 8 (Eight) Hours As Needed for Nausea or Vomiting., Disp: 30 tablet, Rfl: 0  •  oxyCODONE-acetaminophen (PERCOCET) 5-325 MG per tablet, Take 1 tablet by mouth Every 8 (Eight) Hours As Needed for Severe Pain ., Disp: 90 tablet, Rfl: 0  •  Polyvinyl Alcohol-Povidone (REFRESH OP), Apply  to eye(s) as directed by provider 2 (Two) Times a Day., Disp: , Rfl:   •  Pregabalin (LYRICA PO), Take 75 mg by mouth 2 (Two) Times a Day., Disp: , Rfl:   •  Probiotic Product (PROBIOTIC-10 ULTIMATE PO), , Disp: , Rfl:   •  promethazine (PHENERGAN) 25 MG tablet, Take 1 tablet by mouth Every 6 (Six) Hours As Needed for Nausea or Vomiting., Disp: 20 tablet, Rfl: 0  •  Semaglutide (OZEMPIC) 1 MG/DOSE solution pen-injector, , Disp: , Rfl:   •  Semaglutide (OZEMPIC) 1 MG/DOSE solution pen-injector, Inject  under the skin into the appropriate area as directed 1 (One) Time Per Week., Disp: , Rfl:   •  Sennosides-Docusate Sodium (ONEL-COLACE PO), Take  by mouth As Needed., Disp: , Rfl:   •  valACYclovir (VALTREX) 500 MG tablet, TAKE 1 (ONE) TABLET TWICE A DAY, Disp: , Rfl: 0  •  vitamin C (ASCORBIC ACID) 250 MG tablet, Take 1,000 mg by mouth Daily., Disp: , Rfl:     Allergies   Allergen Reactions   • Diphenhydramine Other (See Comments)     Agitated, and restless   • Latex Rash   • Sulfamethoxazole-Trimethoprim Itching and Rash       Past Medical History:   Diagnosis Date   • Anxiety    • Asthma    • Autonomic dysfunction 2017   • Bulging lumbar disc 2015   • Chronic pain    • Chronic sinus infection    • Depression    • Dizziness    •  Essential hypertension    • Fatigue    • Fibromyalgia    • Flexor hallucis longus tendinitis 2013   • H/O Bilateral carpal tunnel syndrome    • H/O transfusion of packed red blood cells    • History of abnormal cervical Pap smear    • History of anemia    • History of infectious mononucleosis    • History of tachycardia     POTS   • Hyperlipidemia    • Hypermobile joint syndrome of multiple sites    • Hypermobile joints    • Leukocytosis    • Numbness and tingling in both hands    • Obesity    • Osteoarthritis    • Palpitations    • POTS (postural orthostatic tachycardia syndrome) 2017   • PROM (premature rupture of membranes)    • PTSD (post-traumatic stress disorder) 2014   • Seasonal allergies    • Snoring    • SOB (shortness of breath)    • Syncope and collapse        Past Surgical History:   Procedure Laterality Date   • ANKLE SURGERY Right 2014    FHL tendon release   • BREAST SURGERY  10/15/2013    Reduction   • CARPAL TUNNEL RELEASE Right 2017    REVISE MEDIAN N/CARPAL TUNNEL SURG   •  SECTION     • HYSTERECTOMY  2018   • ROTATOR CUFF REPAIR Right    • SINUS SURGERY     • WISDOM TOOTH EXTRACTION         Social History     Tobacco Use   • Smoking status: Never Smoker   • Smokeless tobacco: Never Used   • Tobacco comment: CAFFEINE USE   Substance Use Topics   • Alcohol use: Yes     Alcohol/week: 0.6 oz     Types: 1 Standard drinks or equivalent per week     Frequency: Never     Comment: occasional   • Drug use: No       Immunization History   Administered Date(s) Administered   • Flu Vaccine Quad PF >36MO 10/01/2016   • Influenza Split Preservative Free ID 2015   • Pneumococcal Polysaccharide (PPSV23) 2017       Physical Exam  General: Alert and oriented x3 in no acute distress  HEENT: Normal cephalic, atraumatic, PERRLA, EOMI, sclera anicteric, moist mucous membranes, neck is supple, no JVD, no carotid bruits, no thyromegaly no adenopathy  Chest: CTA  "and percussion  CVA: RRR, normal S1-S2, no murmurs, no gallops or rubs  Abdomen: Positive BS, soft, nondistended, nontender, no rebound, no guarding, no hernias, no organomegaly and no masses  Extremities: Full range of motion, no clubbing, no cyanosis or edema  Neurovascular: Grossly intact  Debilities/Disabilities Identified: None    Emotional Behavior: Appropriate      /78   Pulse 100   Resp 16   Ht 152.4 cm (60\")   Wt 74.8 kg (164 lb 12.8 oz)   BMI 32.19 kg/m²     I have personally reviewed her ultrasound which reveals no gallstones, have reviewed her CT scan which is normal    Assessment and plan  Epigastric and right upper quadrant abdominal pain, nausea and vomiting  I have recommended further evaluation with a HIDA scan I do suspect that this is a biliary dyskinesia as it is associated with postprandial symptoms.  I have scheduled a HIDA scan and will make further recommendations based on the HIDA scan.  I have discussed with her if the HIDA scan is positive I would recommend a laparoscopic cholecystectomy.  I have discussed this procedure in detail with the patient given her patient teaching pamphlet.  I have discussed the risks, benefits and alternatives.  I have discussed the risk of anesthesia, bleeding, infection, common bile duct injuries and bowel injuries.  Patient understands these risks, benefits and alternatives.  I have her scheduled for her HIDA scan and we will go from there.  Patient is currently on Phenergan for her nausea.      "

## 2019-05-28 NOTE — TELEPHONE ENCOUNTER
I spoke to pt on the phone and let her know I had talked to Dr. Diaz about her persistent abdominal pain and he thought it was best to send her to Dr. Anthony with general surgery. She was agreeable.

## 2019-05-31 ENCOUNTER — TELEPHONE (OUTPATIENT)
Dept: INTERNAL MEDICINE | Facility: CLINIC | Age: 34
End: 2019-05-31

## 2019-05-31 ENCOUNTER — HOSPITAL ENCOUNTER (OUTPATIENT)
Dept: NUCLEAR MEDICINE | Facility: HOSPITAL | Age: 34
Discharge: HOME | End: 2019-05-31

## 2019-05-31 DIAGNOSIS — R10.10 PAIN OF UPPER ABDOMEN: ICD-10-CM

## 2019-05-31 PROCEDURE — 0 TECHNETIUM TC 99M MEBROFENIN KIT: Performed by: NURSE PRACTITIONER

## 2019-05-31 PROCEDURE — A9537 TC99M MEBROFENIN: HCPCS | Performed by: NURSE PRACTITIONER

## 2019-05-31 PROCEDURE — 78226 HEPATOBILIARY SYSTEM IMAGING: CPT

## 2019-05-31 RX ORDER — KIT FOR THE PREPARATION OF TECHNETIUM TC 99M MEBROFENIN 45 MG/10ML
1 INJECTION, POWDER, LYOPHILIZED, FOR SOLUTION INTRAVENOUS
Status: COMPLETED | OUTPATIENT
Start: 2019-05-31 | End: 2019-05-31

## 2019-05-31 RX ORDER — PROMETHAZINE HYDROCHLORIDE 25 MG/1
25 TABLET ORAL EVERY 6 HOURS PRN
Qty: 20 TABLET | Refills: 0 | Status: SHIPPED | OUTPATIENT
Start: 2019-05-31 | End: 2020-02-24

## 2019-05-31 RX ADMIN — MEBROFENIN 1 DOSE: 45 INJECTION, POWDER, LYOPHILIZED, FOR SOLUTION INTRAVENOUS at 07:10

## 2019-05-31 NOTE — TELEPHONE ENCOUNTER
Spoke to pt and let her know the HIDA scan showed her gallbladder is not funcitoning properly. She will call Dr. Anthony's  to schedule surgery as recommended by Dr. Anthony.

## 2019-06-07 ENCOUNTER — OUTSIDE FACILITY SERVICE (OUTPATIENT)
Dept: SURGERY | Facility: CLINIC | Age: 34
End: 2019-06-07

## 2019-06-07 PROCEDURE — 47563 LAPARO CHOLECYSTECTOMY/GRAPH: CPT | Performed by: SURGERY

## 2019-06-07 PROCEDURE — 88304 TISSUE EXAM BY PATHOLOGIST: CPT | Performed by: SURGERY

## 2019-06-10 ENCOUNTER — LAB REQUISITION (OUTPATIENT)
Dept: LAB | Facility: HOSPITAL | Age: 34
End: 2019-06-10

## 2019-06-10 DIAGNOSIS — K82.8 OTHER SPECIFIED DISEASES OF GALLBLADDER: ICD-10-CM

## 2019-06-12 LAB
LAB AP CASE REPORT: NORMAL
LAB AP CLINICAL INFORMATION: NORMAL
LAB AP DIAGNOSIS COMMENT: NORMAL
PATH REPORT.FINAL DX SPEC: NORMAL
PATH REPORT.GROSS SPEC: NORMAL

## 2019-06-17 ENCOUNTER — OFFICE VISIT (OUTPATIENT)
Dept: SURGERY | Facility: CLINIC | Age: 34
End: 2019-06-17

## 2019-06-17 ENCOUNTER — TELEPHONE (OUTPATIENT)
Dept: SURGERY | Facility: CLINIC | Age: 34
End: 2019-06-17

## 2019-06-17 VITALS — DIASTOLIC BLOOD PRESSURE: 64 MMHG | SYSTOLIC BLOOD PRESSURE: 120 MMHG | TEMPERATURE: 98.3 F

## 2019-06-17 DIAGNOSIS — Z09 SURGICAL FOLLOW-UP CARE: Primary | ICD-10-CM

## 2019-06-17 PROCEDURE — 99024 POSTOP FOLLOW-UP VISIT: CPT | Performed by: SURGERY

## 2019-06-17 NOTE — TELEPHONE ENCOUNTER
I would need to see the patient in the office today or tomorrow if she is not coming in today she needs to get in the shower twice daily

## 2019-06-17 NOTE — TELEPHONE ENCOUNTER
PT CALLED COMPLAINING OF WOUND  OPENING AND SWOLLEN.  USED BACTRICACIN   THINKS SHE MAY BE ALLERGIC TO IT.  HOT TO THE TOUCH.  NO FEVER .  PT STATED THIS HAPPENED 06/16.  STATES IT IS BROWN AND GEL LOOKING STUFF COMING FROM INCISION.  POST OP IS 06/25

## 2019-06-17 NOTE — PROGRESS NOTES
10 day PO lap sylvain - Wound check - states she is having drainage and not feeling well after surgery  She is 10 days status post a laparoscopic cholecystectomy by Dr. Anthony for what appears to be biliary dyskinesia.  She complains of some minor drainage from the infraumbilical incision.  The operative note is is not available for me to review.  She has been treating the area with Aquaphor and bacitracin.  She has mild skin separation with some adherent exudate on the 5 mm infraumbilical incision.  There is no evidence of cellulitis or abscess.  I recommend she stop using bacitracin to the site and clean the area with hydrogen peroxide and shower daily.  She should keep her regular appointment with Dr. Anthony.

## 2019-06-25 ENCOUNTER — OFFICE VISIT (OUTPATIENT)
Dept: SURGERY | Facility: CLINIC | Age: 34
End: 2019-06-25

## 2019-06-25 VITALS
HEIGHT: 60 IN | HEART RATE: 105 BPM | BODY MASS INDEX: 33.95 KG/M2 | DIASTOLIC BLOOD PRESSURE: 80 MMHG | OXYGEN SATURATION: 99 % | WEIGHT: 172.9 LBS | SYSTOLIC BLOOD PRESSURE: 140 MMHG

## 2019-06-25 DIAGNOSIS — Z09 FOLLOW UP: Primary | ICD-10-CM

## 2019-06-25 PROCEDURE — 99024 POSTOP FOLLOW-UP VISIT: CPT | Performed by: SURGERY

## 2019-06-25 NOTE — PROGRESS NOTES
"Chief complaint:  Post-op  Follow up    History of Present Illness    This is Marycarmen Gauthier 34 y.o. status post laparoscopic cholecystectomy and is doing very well.  Patient denies fever, chills, nausea or vomiting.  Patient's pain is well-controlled.      The following portions of the patient's history were reviewed and updated as appropriate: allergies, current medications, past family history, past medical history, past social history, past surgical history and problem list.    Vitals:    06/25/19 1337   BP: 140/80   Pulse: 105   SpO2: 99%   Weight: 78.4 kg (172 lb 14.4 oz)   Height: 152.4 cm (60\")       Physical Exam  Gen.: Patient is alert and oriented ×3, no acute distress  HEENT: Normal cephalic, atraumatic, moist mucous membranes, anicteric  Incision is well-healed without evidence of infection, herniation or seroma.    Assessment/plan:    This is Marycarmen Gauthier 34 y.o. status post laparoscopic cholecystectomy and is doing very well.  I have instructed the patient not lift greater than 10 pounds for total of 6 weeks from the time of surgery. I have instructed the patient follow-up as needed.    Padmini Anthony MD  General, Minimally Invasive and Endoscopic Surgery  [facility]    [address] [facility] [address] [facility]   [address]    [address]  [address] [address]    P: [phone]  F: [phone]    Cc:  Gene Diaz MD  "

## 2019-06-27 ENCOUNTER — OFFICE VISIT (OUTPATIENT)
Dept: INTERNAL MEDICINE | Facility: CLINIC | Age: 34
End: 2019-06-27

## 2019-06-27 VITALS
WEIGHT: 174 LBS | BODY MASS INDEX: 34.16 KG/M2 | HEART RATE: 109 BPM | DIASTOLIC BLOOD PRESSURE: 82 MMHG | HEIGHT: 60 IN | OXYGEN SATURATION: 100 % | SYSTOLIC BLOOD PRESSURE: 130 MMHG

## 2019-06-27 DIAGNOSIS — I10 ESSENTIAL HYPERTENSION: Chronic | ICD-10-CM

## 2019-06-27 DIAGNOSIS — G90.A POTS (POSTURAL ORTHOSTATIC TACHYCARDIA SYNDROME): Primary | Chronic | ICD-10-CM

## 2019-06-27 DIAGNOSIS — E78.01 FAMILIAL HYPERCHOLESTEROLEMIA: Chronic | ICD-10-CM

## 2019-06-27 DIAGNOSIS — Z90.49 S/P CHOLECYSTECTOMY: ICD-10-CM

## 2019-06-27 DIAGNOSIS — M24.80 HYPERMOBILE JOINT SYNDROME OF MULTIPLE SITES: ICD-10-CM

## 2019-06-27 DIAGNOSIS — M79.10 MYALGIA: Chronic | ICD-10-CM

## 2019-06-27 PROCEDURE — 99214 OFFICE O/P EST MOD 30 MIN: CPT | Performed by: INTERNAL MEDICINE

## 2019-06-27 NOTE — PROGRESS NOTES
Subjective   Marycarmen Gauthier is a 34 y.o. female.  Presents with a chief complaint of a recent laparoscopic cholecystectomy with some issues regarding wound closure, especially her umbilical wound.  Concurrently the patient has significant issues with pots disease, hypermobile joint syndrome, episodic hypertension, hyperlipidemia, profound fatigue and malaise, unexpected weight gain postsurgically, here for evaluation and treatment.  Unfortunately the patient has not made any real progress in terms of improving her joint pain, fatigue myalgias.    History of Present Illness here for follow-up regarding multiple medical problems, she is status post a recent laparoscopic cholecystectomy    The following portions of the patient's history were reviewed and updated as appropriate: allergies, current medications, past family history, past medical history, past social history, past surgical history and problem list.    Review of Systems   Constitutional: Positive for fatigue and unexpected weight gain.   HENT: Positive for congestion.    Eyes: Negative.    Respiratory: Negative.    Cardiovascular: Positive for palpitations.   Genitourinary: Positive for pelvic pain.   Musculoskeletal: Positive for arthralgias and myalgias.   Skin: Negative.    Allergic/Immunologic: Negative.    Neurological: Negative.    Hematological: Negative.    Psychiatric/Behavioral: Negative.        Objective   Physical Exam   Constitutional: She is oriented to person, place, and time. She appears well-developed and well-nourished.   HENT:   Head: Normocephalic and atraumatic.   Eyes: EOM are normal. Pupils are equal, round, and reactive to light.   Neck: Normal range of motion. Neck supple.   Cardiovascular: Normal rate, regular rhythm and normal heart sounds.   Pulmonary/Chest: Effort normal and breath sounds normal.   Abdominal: Soft. Bowel sounds are normal.   Status post laparoscopic cholecystectomy with delayed wound closure of her umbilical  wound  Currently working with her general surgeon on wound healing   Genitourinary:   Genitourinary Comments: Vaginal lichen planus   Musculoskeletal:   Hypermobile joint syndrome with diffuse myalgias and arthralgias   Neurological: She is alert and oriented to person, place, and time.   Skin: Skin is warm.   Psychiatric: She has a normal mood and affect.   Nursing note and vitals reviewed.        Assessment/Plan   Marycarmen was seen today for hypertension and leukocytosis.    Diagnoses and all orders for this visit:    POTS (postural orthostatic tachycardia syndrome)  Comments:  stable for now    Familial hypercholesterolemia  Comments:  check a lipid level  Orders:  -     Lipid Panel; Future  -     Comprehensive Metabolic Panel; Future    Essential hypertension  Comments:  well controlled    Myalgia  Comments:  stable for now    Hypermobile joint syndrome of multiple sites  Comments:  continue conservative management    S/P cholecystectomy  Comments:  doing well with some issues with wound closure

## 2019-06-28 ENCOUNTER — TELEPHONE (OUTPATIENT)
Dept: SURGERY | Facility: CLINIC | Age: 34
End: 2019-06-28

## 2019-06-28 ENCOUNTER — LAB (OUTPATIENT)
Dept: INTERNAL MEDICINE | Facility: CLINIC | Age: 34
End: 2019-06-28

## 2019-06-28 DIAGNOSIS — E78.01 FAMILIAL HYPERCHOLESTEROLEMIA: Chronic | ICD-10-CM

## 2019-06-28 LAB
ALBUMIN SERPL-MCNC: 4 G/DL (ref 3.5–5.2)
ALBUMIN/GLOB SERPL: 1.4 G/DL
ALP SERPL-CCNC: 99 U/L (ref 39–117)
ALT SERPL-CCNC: 19 U/L (ref 1–33)
AST SERPL-CCNC: 21 U/L (ref 1–32)
BILIRUB SERPL-MCNC: <0.2 MG/DL (ref 0.2–1.2)
BUN SERPL-MCNC: 8 MG/DL (ref 6–20)
BUN/CREAT SERPL: 13.8 (ref 7–25)
CALCIUM SERPL-MCNC: 9.6 MG/DL (ref 8.6–10.5)
CHLORIDE SERPL-SCNC: 104 MMOL/L (ref 98–107)
CHOLEST SERPL-MCNC: 197 MG/DL (ref 0–200)
CO2 SERPL-SCNC: 28.3 MMOL/L (ref 22–29)
CREAT SERPL-MCNC: 0.58 MG/DL (ref 0.57–1)
GLOBULIN SER CALC-MCNC: 2.8 GM/DL
GLUCOSE SERPL-MCNC: 89 MG/DL (ref 65–99)
HDLC SERPL-MCNC: 45 MG/DL (ref 40–60)
LDLC SERPL CALC-MCNC: 104 MG/DL (ref 0–100)
POTASSIUM SERPL-SCNC: 4.5 MMOL/L (ref 3.5–5.2)
PROT SERPL-MCNC: 6.8 G/DL (ref 6–8.5)
SODIUM SERPL-SCNC: 144 MMOL/L (ref 136–145)
TRIGL SERPL-MCNC: 240 MG/DL (ref 0–150)
VLDLC SERPL-MCNC: 48 MG/DL

## 2019-06-28 NOTE — TELEPHONE ENCOUNTER
Pt called and was concerned with her incision.  It had the consistency of a mucus color drainage.  No fever.  Not hot to the touch. Pt reported it was painful and had to take a pain pill at night.  Pt saw Dr. Anthony last week and had silver nitrate applied.  I informed Dr. Anthony of this and she requested I call in Clindamycin po tid 30

## 2019-07-02 ENCOUNTER — OFFICE VISIT (OUTPATIENT)
Dept: SURGERY | Facility: CLINIC | Age: 34
End: 2019-07-02

## 2019-07-02 VITALS
HEART RATE: 111 BPM | WEIGHT: 175.5 LBS | SYSTOLIC BLOOD PRESSURE: 142 MMHG | HEIGHT: 60 IN | OXYGEN SATURATION: 98 % | BODY MASS INDEX: 34.46 KG/M2 | DIASTOLIC BLOOD PRESSURE: 84 MMHG

## 2019-07-02 DIAGNOSIS — Z09 FOLLOW UP: Primary | ICD-10-CM

## 2019-07-02 PROCEDURE — 99024 POSTOP FOLLOW-UP VISIT: CPT | Performed by: SURGERY

## 2019-07-02 RX ORDER — CLINDAMYCIN HYDROCHLORIDE 300 MG/1
300 CAPSULE ORAL 3 TIMES DAILY
Refills: 0 | COMMUNITY
Start: 2019-06-28 | End: 2019-09-03

## 2019-07-02 NOTE — PROGRESS NOTES
"Chief complaint:  Post-op  Follow up    History of Present Illness    This is Marycarmen Gauthier 34 y.o. status post *** and is doing very well.  Patient denies fever, chills, nausea or vomiting.  Patient's pain is well-controlled.      The following portions of the patient's history were reviewed and updated as appropriate: allergies, current medications, past family history, past medical history, past social history, past surgical history and problem list.    Vitals:    07/02/19 1403   BP: 142/84   Pulse: 111   SpO2: 98%   Weight: 79.6 kg (175 lb 8 oz)   Height: 152.4 cm (60\")       Physical Exam  Gen.: Patient is alert and oriented ×3, no acute distress  HEENT: Normal cephalic, atraumatic, moist mucous membranes, anicteric  Incision is well-healed without evidence of {No wound infection:56869}.    Assessment/plan:    This is Marycarmen Gauthier 34 y.o. status post *** and is doing very well.  I have instructed the patient not lift greater than 10 pounds for total of 6 weeks from the time of surgery. I have instructed the patient follow-up as needed.    Padmini Anthony MD  General, Minimally Invasive and Endoscopic Surgery  [facility]    [address] [facility] [address] [facility] [address]    [address]  [place], [place] [address] [place] [address]    P: [phone]  F: [phone]    Cc:  Gene Diaz MD  "

## 2019-07-02 NOTE — PROGRESS NOTES
"Chief complaint:  Post-op  Follow up    History of Present Illness    This is Marycarmen Gauthier 34 y.o. status post lap sylvain and is doing very well.  Patient denies fever, chills, nausea or vomiting.  Patient's pain is well-controlled.      The following portions of the patient's history were reviewed and updated as appropriate: allergies, current medications, past family history, past medical history, past social history, past surgical history and problem list.    Vitals:    07/02/19 1403   BP: 142/84   Pulse: 111   SpO2: 98%   Weight: 79.6 kg (175 lb 8 oz)   Height: 152.4 cm (60\")       Physical Exam  Gen.: Patient is alert and oriented ×3, no acute distress  HEENT: Normal cephalic, atraumatic, moist mucous membranes, anicteric  Incision is well-healed without evidence of infection, herniation or seroma.    Assessment/plan:  S/p lap sylvain  Incision healed  F/u as needed     Padmini Anthony MD  General, Minimally Invasive and Endoscopic Surgery  [facility]    [facility] [address] [place] [address]    [address]  [place] [address]               [place] [address]    P: [phone]  F: [phone]    Cc:  Gene Diaz MD  "

## 2019-07-09 ENCOUNTER — OFFICE VISIT (OUTPATIENT)
Dept: SURGERY | Facility: CLINIC | Age: 34
End: 2019-07-09

## 2019-07-09 VITALS
DIASTOLIC BLOOD PRESSURE: 80 MMHG | HEIGHT: 60 IN | HEART RATE: 110 BPM | BODY MASS INDEX: 33.92 KG/M2 | OXYGEN SATURATION: 98 % | SYSTOLIC BLOOD PRESSURE: 140 MMHG | WEIGHT: 172.8 LBS

## 2019-07-09 DIAGNOSIS — Z09 FOLLOW UP: Primary | ICD-10-CM

## 2019-07-09 PROCEDURE — 99024 POSTOP FOLLOW-UP VISIT: CPT | Performed by: SURGERY

## 2019-07-09 NOTE — PROGRESS NOTES
"Chief complaint:  Post-op  Follow up    History of Present Illness    This is Marycarmen Gauthier 34 y.o. status post laparoscopic cholecystectomy.  Patient reports her right upper trocar site has a white string hanging out.  Patient denies fever, chills, nausea or vomiting.  Patient's pain is well-controlled.      The following portions of the patient's history were reviewed and updated as appropriate: allergies, current medications, past family history, past medical history, past social history, past surgical history and problem list.    Vitals:    07/09/19 1225   BP: 140/80   Pulse: 110   SpO2: 98%   Weight: 78.4 kg (172 lb 12.8 oz)   Height: 152.4 cm (60\")       Physical Exam  Gen.: Patient is alert and oriented ×3, no acute distress  HEENT: Normal cephalic, atraumatic, moist mucous membranes, anicteric  Incision is well-healed without evidence of infection, herniation or seroma.  Right upper quadrant lower incision with suture working its way to the surface.  I have removed it with scissors.  There is no evidence of any infection or other issues.    Assessment/plan:  Suture removal from right upper quadrant inferior trocar site  Patient will follow-up as needed     Padmini Anthony MD  General, Minimally Invasive and Endoscopic Surgery  [facility]    [address] [facility] [address] [facility]   [address]    [address]  [place], [place] [address] [place] [address]    P: [phone]  F: [phone]    Cc:  Gene Diaz MD  "

## 2019-08-20 ENCOUNTER — OFFICE VISIT (OUTPATIENT)
Dept: CARDIOLOGY | Facility: CLINIC | Age: 34
End: 2019-08-20

## 2019-08-20 VITALS
HEART RATE: 103 BPM | SYSTOLIC BLOOD PRESSURE: 126 MMHG | DIASTOLIC BLOOD PRESSURE: 86 MMHG | HEIGHT: 60 IN | BODY MASS INDEX: 35.02 KG/M2 | WEIGHT: 178.4 LBS

## 2019-08-20 DIAGNOSIS — G90.A POTS (POSTURAL ORTHOSTATIC TACHYCARDIA SYNDROME): Primary | ICD-10-CM

## 2019-08-20 DIAGNOSIS — E78.01 FAMILIAL HYPERCHOLESTEROLEMIA: ICD-10-CM

## 2019-08-20 DIAGNOSIS — I10 ESSENTIAL HYPERTENSION: ICD-10-CM

## 2019-08-20 PROCEDURE — 93000 ELECTROCARDIOGRAM COMPLETE: CPT | Performed by: INTERNAL MEDICINE

## 2019-08-20 PROCEDURE — 99214 OFFICE O/P EST MOD 30 MIN: CPT | Performed by: INTERNAL MEDICINE

## 2019-08-20 NOTE — PROGRESS NOTES
Date of Office Visit: 2019  Encounter Provider: Jazz Hardin MD  Place of Service: [facility] CARDIOLOGY  Patient Name: Marycarmen Gauthier  :1985      Patient ID:  Marycarmen Gauthier is a 34 y.o. female is here for  followup for POTS.       History of Present Illness    She had a tilt table study done 17 which showed inappropriate sinus tachycardia but no evidence of vasovagal syncope.  Tachycardia occurred after nitroglycerin.     She was seen at the [facility] 2018.  She went there for POTS.  In , she had premature rupture of membranes and had her son prematurely at 29 weeks.  He was in the  intensive care unit for 3 months.  After that, she felt like her nerves are shot.  She began having nausea and sweating.  She also started noticing episodes of anxiety, short windedness and tachycardia.  She was having difficulty eating caring for her son because when these episodes of dizziness and tachycardia, on, she has to lie down.  She's checked her blood pressure before when she's had the episodes and she could not even get a blood pressure on her blood pressure monitor.  He does have a history of fibromyalgia, hypermobile joints and posttraumatic stress disorder.  Her autonomic testing done at [facility] showed a normal QSART so no peripheral autonomic neuropathy.  Her tilt table study showed postural tachycardia but stable blood pressure.  They thought she had postural orthostatic tachycardia syndrome and recommended exercise and consideration of beta blockers.  In addition they recommended 3-5 g of sodium a day, to 2.5 L of fluid per day and sleeping with her head raised 6-10 inches as well as compression stockings avoiding alcohol and large meals.        She had an echocardiogram done 2017 which was normal.  She sees Dr. Lomas from neurology at [facility] for left arm numbness.     She does have joint hypermobility and sicca  syndrome and sees Dr. Crook.  She has fibromyalgia.  She also has endometriosis.     She is  and has one child who was born premature.  He is hyperactive.  She is an RN.  She doesn't smoke.  She has occasional alcohol.  She has one cup of coffee per day.  There is no family history of premature cardiovascular disease.     She's been diagnosed with lyme disease.  She had a hysterectomy with Dr. Mason 9/2018.  She had a cholecystectomy 6/2019 and had a postop infection.  Is just now recovering from this.  She has no chest pain.  She does not feel her heart racing or skipping.  Her exercise tolerance is down.  She is trying to get back and exercise.  She has had no syncope but does feel dizzy at times.  She is also had some weakness and fatigue but she feels like she is finally getting better.    Past Medical History:   Diagnosis Date   • Anxiety    • Asthma    • Autonomic dysfunction 2017   • Bulging lumbar disc 2015   • Chronic pain    • Chronic sinus infection    • Depression    • Dizziness    • VICKERS (dyspnea on exertion)    • Essential hypertension    • Fatigue    • Fibromyalgia    • Flexor hallucis longus tendinitis 06/2013   • H/O Bilateral carpal tunnel syndrome    • H/O transfusion of packed red blood cells    • History of abnormal cervical Pap smear 2011   • History of anemia    • History of infectious mononucleosis    • History of tachycardia     POTS   • Hyperlipidemia    • Hypermobile joint syndrome of multiple sites    • Hypermobile joints    • Leukocytosis    • Numbness and tingling in both hands    • Obesity    • Osteoarthritis    • Palpitations    • POTS (postural orthostatic tachycardia syndrome) 09/19/2017   • PROM (premature rupture of membranes)    • PTSD (post-traumatic stress disorder) 07/2014   • Seasonal allergies    • Snoring    • SOB (shortness of breath)    • Syncope and collapse          Past Surgical History:   Procedure Laterality Date   • ANKLE SURGERY Right 2014    FHL tendon  "release   • BREAST SURGERY  10/15/2013    Reduction   • CARPAL TUNNEL RELEASE Right 2017    REVISE MEDIAN N/CARPAL TUNNEL SURG   •  SECTION     • HYSTERECTOMY  2018   • LAPAROSCOPIC CHOLECYSTECTOMY W/ CHOLANGIOGRAPHY     • ROTATOR CUFF REPAIR Right    • SINUS SURGERY     • WISDOM TOOTH EXTRACTION         Current Outpatient Medications on File Prior to Visit   Medication Sig Dispense Refill   • 7-Keto DHEA powder      • acetaminophen (TYLENOL) 650 MG 8 hr tablet Take 650 mg by mouth As Needed for Mild Pain .     • Acetylcysteine ( PO)      • ALBUTEROL SULFATE HFA IN Inhale.     • Alpha-Lipoic Acid 100 MG capsule Take  by mouth 2 (Two) Times a Day.     • ASHWAGANDHA PO Take 450 mg by mouth Daily.     • BD INSULIN SYRINGE U/F 31G X \" 0.5 ML misc      • Cholecalciferol (VITAMIN D-3) 1000 units capsule Take 1,000 Units by mouth Daily.     • clindamycin (CLEOCIN) 300 MG capsule Take 300 mg by mouth 3 (Three) Times a Day.  0   • Cobamamide (ADENOSYLCOBALAMIN) powder      • Cyanocobalamin (VITAMIN B-12 PO) Take  by mouth. b12 adenosylocobalamin 300 ug with folic acid logenges 1 in the morning     • cycloSPORINE (RESTASIS) 0.05 % ophthalmic emulsion 1 drop 2 (Two) Times a Day.     • diclofenac (VOLTAREN) 1 % gel gel Apply 4 g topically to the appropriate area as directed 4 (Four) Times a Day As Needed.     • DULoxetine HCl 40 MG capsule delayed-release particles Take 2 capsules by mouth Daily.  3   • Fexofenadine HCl (ALLEGRA ALLERGY PO) Take 180 mg by mouth As Needed.     • HYDROcodone-acetaminophen (NORCO) 5-325 MG per tablet Take 1 tablet by mouth Every 6 (Six) Hours As Needed for Moderate Pain  or Severe Pain . 12 tablet 0   • Ibuprofen (ADVIL PO) Take 1 tablet by mouth Daily.     • L-THEANINE PO Take 200 mg by mouth 2 (Two) Times a Day.     • LYRICA 100 MG capsule Take 100 mg by mouth 3 (Three) Times a Day.  1   • Multiple Vitamin (MULTI-VITAMIN DAILY PO) Take 1 tablet by mouth " Daily.     • Omega-3 Fatty Acids (OMEGA-3 FISH OIL PO) Take 1 tablet by mouth Daily.     • omeprazole (priLOSEC) 40 MG capsule Take 40 mg by mouth 2 (Two) Times a Day.     • ondansetron (ZOFRAN) 4 MG tablet Take 1 tablet by mouth Every 8 (Eight) Hours As Needed for Nausea or Vomiting. 30 tablet 0   • Polyvinyl Alcohol-Povidone (REFRESH OP) Apply  to eye(s) as directed by provider 2 (Two) Times a Day.     • promethazine (PHENERGAN) 25 MG tablet Take 1 tablet by mouth Every 6 (Six) Hours As Needed for Nausea or Vomiting. 20 tablet 0   • Semaglutide (OZEMPIC) 1 MG/DOSE solution pen-injector Inject 1 mg under the skin into the appropriate area as directed 1 (One) Time Per Week.     • Sennosides-Docusate Sodium (ONEL-COLACE PO) Take  by mouth As Needed.     • terconazole (TERAZOL 3) 0.8 % vaginal cream INSERT 1 APPLICATORFUL VAGINALLY EVERY DAY AT NIGHT  1   • valACYclovir (VALTREX) 500 MG tablet TAKE 1 (ONE) TABLET TWICE A DAY  0   • vitamin C (ASCORBIC ACID) 250 MG tablet Take 1,000 mg by mouth Daily.       No current facility-administered medications on file prior to visit.        Social History     Socioeconomic History   • Marital status:      Spouse name: Roe   • Number of children: 1   • Years of education: College   • Highest education level: Not on file   Occupational History   • Occupation: RN     Employer: [employer]   Tobacco Use   • Smoking status: Never Smoker   • Smokeless tobacco: Never Used   • Tobacco comment: CAFFEINE USE   Substance and Sexual Activity   • Alcohol use: Yes     Alcohol/week: 0.6 oz     Types: 1 Standard drinks or equivalent per week     Frequency: Never     Comment: occasional   • Drug use: No   • Sexual activity: Yes     Partners: Male     Birth control/protection: Condom, None           Review of Systems   Constitution: Negative.   HENT: Negative for congestion.    Eyes: Negative for vision loss in left eye and vision loss in right eye.   Respiratory: Negative.   "Negative for cough, hemoptysis, shortness of breath, sleep disturbances due to breathing, snoring, sputum production and wheezing.    Endocrine: Negative.    Hematologic/Lymphatic: Negative.    Skin: Negative for poor wound healing and rash.   Musculoskeletal: Negative for falls, gout, muscle cramps and myalgias.   Gastrointestinal: Negative for abdominal pain, diarrhea, dysphagia, hematemesis, melena, nausea and vomiting.   Neurological: Negative for excessive daytime sleepiness, dizziness, headaches, light-headedness, loss of balance, seizures and vertigo.   Psychiatric/Behavioral: Negative for depression and substance abuse. The patient is not nervous/anxious.        Procedures    ECG 12 Lead  Date/Time: 8/20/2019 11:16 AM  Performed by: Jazz Hardin MD  Authorized by: Jazz Hardin MD   Comparison: compared with previous ECG   Similar to previous ECG  Rhythm: sinus rhythm    Clinical impression: normal ECG                Objective:      Vitals:    08/20/19 1057   BP: 126/86   BP Location: Right arm   Patient Position: Sitting   Pulse: 103   Weight: 80.9 kg (178 lb 6.4 oz)   Height: 152.4 cm (60\")     Body mass index is 34.84 kg/m².    Physical Exam   Constitutional: She is oriented to person, place, and time. She appears well-developed and well-nourished. No distress.   HENT:   Head: Normocephalic and atraumatic.   Eyes: Conjunctivae are normal. No scleral icterus.   Neck: Neck supple. No JVD present. Carotid bruit is not present. No thyromegaly present.   Cardiovascular: Normal rate, regular rhythm, S1 normal, S2 normal, normal heart sounds and intact distal pulses.  No extrasystoles are present. PMI is not displaced. Exam reveals no gallop.   No murmur heard.  Pulses:       Carotid pulses are 2+ on the right side, and 2+ on the left side.       Radial pulses are 2+ on the right side, and 2+ on the left side.        Dorsalis pedis pulses are 2+ on the right side, and 2+ on the left side.        " Posterior tibial pulses are 2+ on the right side, and 2+ on the left side.   Pulmonary/Chest: Effort normal and breath sounds normal. No respiratory distress. She has no wheezes. She has no rhonchi. She has no rales. She exhibits no tenderness.   Abdominal: Soft. Bowel sounds are normal. She exhibits no distension, no abdominal bruit and no mass. There is no tenderness.   Musculoskeletal: She exhibits no edema or deformity.   Lymphadenopathy:     She has no cervical adenopathy.   Neurological: She is alert and oriented to person, place, and time. No cranial nerve deficit.   Skin: Skin is warm and dry. No rash noted. She is not diaphoretic. No cyanosis. No pallor. Nails show no clubbing.   Psychiatric: She has a normal mood and affect. Judgment normal.   Vitals reviewed.      Lab Review:       Assessment:      Diagnosis Plan   1. POTS (postural orthostatic tachycardia syndrome)     2. Essential hypertension     3. Familial hypercholesterolemia          1. POTS,  Stop atenolol this time.  Using recumbent bike, pool therapy and yoga.  Continued to maintain hydration and salt intake.  Use lean protein sources and fresh fruits and vegetables.  Avoid carbohydrates.  Use cooling scarves during the hot summer months as she is very sensitive to this.  2. Hypermobile joints.   3. Hypertension,  Well controlled.   4. Lyme disease, treated by Dr. Wesley.      Plan:       See wayne in 1 year, no changes.

## 2019-09-03 ENCOUNTER — OFFICE VISIT (OUTPATIENT)
Dept: INTERNAL MEDICINE | Facility: CLINIC | Age: 34
End: 2019-09-03

## 2019-09-03 VITALS
SYSTOLIC BLOOD PRESSURE: 134 MMHG | WEIGHT: 178 LBS | OXYGEN SATURATION: 99 % | HEART RATE: 95 BPM | DIASTOLIC BLOOD PRESSURE: 89 MMHG | BODY MASS INDEX: 34.95 KG/M2 | HEIGHT: 60 IN

## 2019-09-03 DIAGNOSIS — E78.01 FAMILIAL HYPERCHOLESTEROLEMIA: ICD-10-CM

## 2019-09-03 DIAGNOSIS — J45.20 MILD INTERMITTENT REACTIVE AIRWAY DISEASE WITHOUT COMPLICATION: ICD-10-CM

## 2019-09-03 DIAGNOSIS — G90.A POTS (POSTURAL ORTHOSTATIC TACHYCARDIA SYNDROME): Primary | ICD-10-CM

## 2019-09-03 DIAGNOSIS — I10 ESSENTIAL HYPERTENSION: Chronic | ICD-10-CM

## 2019-09-03 PROCEDURE — 99214 OFFICE O/P EST MOD 30 MIN: CPT | Performed by: INTERNAL MEDICINE

## 2019-09-03 RX ORDER — MONTELUKAST SODIUM 10 MG/1
10 TABLET ORAL NIGHTLY
Qty: 30 TABLET | Refills: 3 | Status: SHIPPED | OUTPATIENT
Start: 2019-09-03 | End: 2019-11-25 | Stop reason: SDUPTHER

## 2019-09-03 NOTE — PROGRESS NOTES
Subjective   Marycarmen Gauthier is a 34 y.o. female.  Presents with a chief complaint of a recent flareup of her allergic rhinitis which is triggered a mild expression of her reactive airway disease over the last 3 weeks, concurrently having issues with pots disease, essential hypertension, and familial hyper lipidemia for which she is stable on all fronts.  We discussed her options and I recommended adding Singulair 10 mg/day with a follow-up plan being adding a Medrol Dosepak if that did not help her allergies at this point.  History of Present Illness recent flare up of her allergic rhinitis and RAD    The following portions of the patient's history were reviewed and updated as appropriate: allergies, current medications, past family history, past medical history, past social history, past surgical history and problem list.    Review of Systems   Constitutional: Positive for fatigue.   HENT: Positive for congestion and rhinorrhea.    Eyes: Negative.    Respiratory: Positive for cough and wheezing.    Musculoskeletal: Positive for arthralgias and myalgias.       Objective   Physical Exam   Constitutional: She appears well-developed and well-nourished.   HENT:   Head: Normocephalic and atraumatic.   Congested nontender sinuses with rhinorrhea   Eyes: Pupils are equal, round, and reactive to light.   Neck: Normal range of motion.   Cardiovascular: Normal rate, regular rhythm and normal heart sounds.   Pulmonary/Chest: Effort normal.   Very slight expiratory wheezing   Abdominal: Soft.   Musculoskeletal:   Diffuse myalgias and arthralgias with episodic joint pain   Nursing note and vitals reviewed.        Assessment/Plan   Marycarmen was seen today for uri.    Diagnoses and all orders for this visit:    POTS (postural orthostatic tachycardia syndrome)  Comments:  stable for now    Essential hypertension  Comments:  moderately well controlled    Familial hypercholesterolemia  Comments:  no change in therapy    Mild  intermittent reactive airway disease without complication  Comments:  add singulair 10 mg per day    Other orders  -     montelukast (SINGULAIR) 10 MG tablet; Take 1 tablet by mouth Every Night.

## 2019-09-06 ENCOUNTER — TELEPHONE (OUTPATIENT)
Dept: INTERNAL MEDICINE | Facility: CLINIC | Age: 34
End: 2019-09-06

## 2019-09-06 RX ORDER — AMOXICILLIN AND CLAVULANATE POTASSIUM 875; 125 MG/1; MG/1
1 TABLET, FILM COATED ORAL 2 TIMES DAILY
Qty: 14 TABLET | Refills: 0 | Status: SHIPPED | OUTPATIENT
Start: 2019-09-06 | End: 2019-09-13

## 2019-09-06 RX ORDER — BENZONATATE 200 MG/1
200 CAPSULE ORAL 3 TIMES DAILY PRN
Qty: 45 CAPSULE | Refills: 0 | Status: SHIPPED | OUTPATIENT
Start: 2019-09-06 | End: 2019-12-12

## 2019-09-06 NOTE — TELEPHONE ENCOUNTER
----- Message from Windy Garcia sent at 9/6/2019  9:45 AM EDT -----  Contact: pt  Pt was seen 9/3 and still not feeling better, would like something called in to help with, coughing and chest congestion.    Pt# [phone]    [id number] - [place], [place] - [address] [phone] [facility] [phone]

## 2019-09-09 ENCOUNTER — OFFICE VISIT (OUTPATIENT)
Dept: INTERNAL MEDICINE | Facility: CLINIC | Age: 34
End: 2019-09-09

## 2019-09-09 ENCOUNTER — APPOINTMENT (OUTPATIENT)
Dept: WOMENS IMAGING | Facility: HOSPITAL | Age: 34
End: 2019-09-09

## 2019-09-09 VITALS
TEMPERATURE: 98.3 F | DIASTOLIC BLOOD PRESSURE: 74 MMHG | SYSTOLIC BLOOD PRESSURE: 112 MMHG | BODY MASS INDEX: 34.76 KG/M2 | HEART RATE: 102 BPM | WEIGHT: 178 LBS | OXYGEN SATURATION: 98 %

## 2019-09-09 DIAGNOSIS — R05.9 COUGH: Primary | ICD-10-CM

## 2019-09-09 DIAGNOSIS — R06.2 WHEEZING: ICD-10-CM

## 2019-09-09 PROCEDURE — 71046 X-RAY EXAM CHEST 2 VIEWS: CPT | Performed by: NURSE PRACTITIONER

## 2019-09-09 PROCEDURE — 71046 X-RAY EXAM CHEST 2 VIEWS: CPT | Performed by: RADIOLOGY

## 2019-09-09 PROCEDURE — 99213 OFFICE O/P EST LOW 20 MIN: CPT | Performed by: NURSE PRACTITIONER

## 2019-09-09 NOTE — PROGRESS NOTES
Subjective   Marycarmen Gauthier is a 34 y.o. female.     She presents with a persistent cough x 2 wks. She was put on singulair, amoxicillin, and tessalon last week when she was seen in office for f/u. Her cough is dry but worsening.      Cough   This is a new problem. The current episode started 1 to 4 weeks ago. The problem has been gradually worsening. The problem occurs constantly. The cough is non-productive. Associated symptoms include postnasal drip, shortness of breath and wheezing. Pertinent negatives include no chest pain, ear pain, fever, rhinorrhea or sore throat. Associated symptoms comments: Hot flashes.        The following portions of the patient's history were reviewed and updated as appropriate: allergies, current medications, past family history, past medical history, past social history, past surgical history and problem list.    Review of Systems   Constitutional: Positive for fatigue. Negative for fever.   HENT: Positive for postnasal drip. Negative for congestion, ear pain, rhinorrhea, sinus pressure, sinus pain and sore throat.    Respiratory: Positive for cough, chest tightness, shortness of breath and wheezing.    Cardiovascular: Negative for chest pain.   Gastrointestinal: Negative for abdominal pain, nausea and vomiting.       Objective   Physical Exam   Constitutional: She appears well-developed and well-nourished.   HENT:   Head: Normocephalic and atraumatic.   Mouth/Throat: Uvula is midline, oropharynx is clear and moist and mucous membranes are normal. Posterior oropharyngeal erythema: clear hypersecretions noted.   Cardiovascular: Normal rate, regular rhythm and normal heart sounds.   No murmur heard.  Pulmonary/Chest: Effort normal. No respiratory distress. She has no decreased breath sounds. She has wheezes. She has no rhonchi.   Musculoskeletal: Normal range of motion.   Neurological: She is alert.   Skin: Skin is warm and dry.   Psychiatric: She has a normal mood and affect. Her  behavior is normal. Judgment and thought content normal.   Vitals reviewed.      Assessment/Plan   Marycarmen was seen today for cough.    Diagnoses and all orders for this visit:    Cough  -     XR Chest 2 View  -     mometasone-formoterol (DULERA 100) 100-5 MCG/ACT inhaler; Inhale 2 puffs 2 (Two) Times a Day for 14 days. Rinse and spit after use    Wheezing  -     mometasone-formoterol (DULERA 100) 100-5 MCG/ACT inhaler; Inhale 2 puffs 2 (Two) Times a Day for 14 days. Rinse and spit after use    She will switch her antihistamine due to long term use of Allegra. Finish course of abx. Use albuterol inhaler for wheezing and SOB.    Increase fluid intake and rest.    Return for worsening of sx.    A Chest X-Ray was ordered. My reading of this film is no acute findings. (No comparison films available: pending review by Radiologist.)

## 2019-09-19 ENCOUNTER — OFFICE VISIT (OUTPATIENT)
Dept: INTERNAL MEDICINE | Facility: CLINIC | Age: 34
End: 2019-09-19

## 2019-09-19 VITALS
SYSTOLIC BLOOD PRESSURE: 126 MMHG | OXYGEN SATURATION: 99 % | TEMPERATURE: 98.5 F | WEIGHT: 176 LBS | HEART RATE: 93 BPM | DIASTOLIC BLOOD PRESSURE: 84 MMHG | BODY MASS INDEX: 34.37 KG/M2

## 2019-09-19 DIAGNOSIS — J01.90 ACUTE NON-RECURRENT SINUSITIS, UNSPECIFIED LOCATION: Primary | ICD-10-CM

## 2019-09-19 DIAGNOSIS — R68.89 FLU-LIKE SYMPTOMS: ICD-10-CM

## 2019-09-19 LAB
EXPIRATION DATE: NORMAL
FLUAV AG NPH QL: NEGATIVE
FLUBV AG NPH QL: NEGATIVE
INTERNAL CONTROL: NORMAL
Lab: NORMAL

## 2019-09-19 PROCEDURE — 87804 INFLUENZA ASSAY W/OPTIC: CPT | Performed by: NURSE PRACTITIONER

## 2019-09-19 PROCEDURE — 99213 OFFICE O/P EST LOW 20 MIN: CPT | Performed by: NURSE PRACTITIONER

## 2019-09-19 RX ORDER — MOMETASONE FUROATE 50 UG/1
2 SPRAY, METERED NASAL DAILY
COMMUNITY
End: 2020-02-24

## 2019-09-19 RX ORDER — AZITHROMYCIN 250 MG/1
TABLET, FILM COATED ORAL
Qty: 6 TABLET | Refills: 0 | Status: SHIPPED | OUTPATIENT
Start: 2019-09-19 | End: 2019-12-12

## 2019-09-19 NOTE — PROGRESS NOTES
Subjective   Marycarmen Gauthier is a 34 y.o. female.     She presents today for cough, h/a, and ear pressure. She has been sick for about 3 wks now with waxing and waning sx. I saw her in acute clinic 10 days ago with main complaint of cough and wheezing. CXR was normal and she was started on maintenance inhaler. He breathing and wheezing has improved but the cough persists and now her head feels congested. She also reports teeth and jaw pain, pressure to her ears, headache, facial flushing, and sinus pain and pressure for the last few days. Her whole family is sick at home.      Cough   This is a chronic problem. The current episode started 1 to 4 weeks ago. The problem has been unchanged. The cough is non-productive. Associated symptoms include ear pain, headaches, nasal congestion and postnasal drip. Pertinent negatives include no chest pain, ear congestion, fever, myalgias, rhinorrhea, sore throat, shortness of breath or wheezing. She has tried steroid inhaler, leukotriene antagonists and a beta-agonist inhaler (antihistamine) for the symptoms. The treatment provided mild relief. Her past medical history is significant for environmental allergies.   Headache    This is a new problem. The current episode started 1 to 4 weeks ago. The problem occurs intermittently. Associated symptoms include coughing, ear pain and sinus pressure. Pertinent negatives include no dizziness, fever, phonophobia, photophobia, rhinorrhea, sore throat or vomiting. She has tried NSAIDs for the symptoms. The treatment provided mild relief.        The following portions of the patient's history were reviewed and updated as appropriate: allergies, current medications, past family history, past medical history, past social history, past surgical history and problem list.    Review of Systems   Constitutional: Negative for fever.   HENT: Positive for ear pain, postnasal drip and sinus pressure. Negative for rhinorrhea and sore throat.    Eyes:  Negative for photophobia.   Respiratory: Positive for cough. Negative for shortness of breath and wheezing.    Cardiovascular: Negative for chest pain.   Gastrointestinal: Negative for vomiting.   Musculoskeletal: Negative for myalgias.   Allergic/Immunologic: Positive for environmental allergies.   Neurological: Positive for headaches. Negative for dizziness.       Objective   Physical Exam   Constitutional: She appears well-developed and well-nourished. No distress.   HENT:   Head: Normocephalic and atraumatic.   Right Ear: Hearing and tympanic membrane normal.   Left Ear: Hearing and tympanic membrane normal.   Nose: No mucosal edema or rhinorrhea. Right sinus exhibits maxillary sinus tenderness and frontal sinus tenderness. Left sinus exhibits maxillary sinus tenderness and frontal sinus tenderness.   Mouth/Throat: Uvula is midline and mucous membranes are normal. No oropharyngeal exudate or posterior oropharyngeal erythema (clear hypersecretions noted). No tonsillar exudate.   Eyes: Conjunctivae are normal. Right eye exhibits no discharge. Left eye exhibits no discharge.   Cardiovascular: Normal rate, regular rhythm and normal heart sounds.   No murmur heard.  Pulmonary/Chest: Effort normal and breath sounds normal. No tachypnea. She has no wheezes.   Lymphadenopathy:        Head (right side): No submental, no submandibular and no tonsillar adenopathy present.        Head (left side): No submental, no submandibular and no tonsillar adenopathy present.   Neurological: She is alert.   Skin: She is not diaphoretic.   Psychiatric: She has a normal mood and affect.   Vitals reviewed.      Assessment/Plan   Marycarmen was seen today for cough, ear pressure and headache.    Diagnoses and all orders for this visit:    Acute non-recurrent sinusitis, unspecified location  -     azithromycin (ZITHROMAX) 250 MG tablet; Take 2 tablets the first day, then 1 tablet daily for 4 days.    Flu-like symptoms  -     POC Influenza A /  B      Flu was negative, discussed with pt.    Continue inhalers and allergy regimen.    She has an appt scheduled with her immunologist/allergist in 3 weeks.    Supportive therapy with antipyretics, analgesics, increased rest and fluids, cold/soft foods, throat lozenges, and salt water gargles.    Return for worsening of sx.

## 2019-11-25 RX ORDER — MONTELUKAST SODIUM 10 MG/1
TABLET ORAL
Qty: 90 TABLET | Refills: 1 | Status: SHIPPED | OUTPATIENT
Start: 2019-11-25 | End: 2020-05-15

## 2019-12-12 ENCOUNTER — OFFICE VISIT (OUTPATIENT)
Dept: INTERNAL MEDICINE | Facility: CLINIC | Age: 34
End: 2019-12-12

## 2019-12-12 VITALS
DIASTOLIC BLOOD PRESSURE: 80 MMHG | TEMPERATURE: 97.8 F | HEART RATE: 93 BPM | SYSTOLIC BLOOD PRESSURE: 128 MMHG | WEIGHT: 180 LBS | OXYGEN SATURATION: 98 % | BODY MASS INDEX: 35.15 KG/M2

## 2019-12-12 DIAGNOSIS — R10.9 FLANK PAIN: ICD-10-CM

## 2019-12-12 DIAGNOSIS — J02.9 SORE THROAT: Primary | ICD-10-CM

## 2019-12-12 DIAGNOSIS — J02.0 STREP THROAT: ICD-10-CM

## 2019-12-12 LAB
BILIRUB UR QL STRIP: NEGATIVE
CLARITY UR: ABNORMAL
COLOR UR: YELLOW
EXPIRATION DATE: ABNORMAL
GLUCOSE UR STRIP-MCNC: NEGATIVE MG/DL
HGB UR QL STRIP.AUTO: NEGATIVE
INTERNAL CONTROL: ABNORMAL
KETONES UR QL STRIP: ABNORMAL
LEUKOCYTE ESTERASE UR QL STRIP.AUTO: NEGATIVE
Lab: ABNORMAL
NITRITE UR QL STRIP: NEGATIVE
PH UR STRIP.AUTO: 6.5 [PH] (ref 5–8)
PROT UR QL STRIP: NEGATIVE
S PYO AG THROAT QL: POSITIVE
SP GR UR STRIP: 1.02 (ref 1–1.03)
UROBILINOGEN UR QL STRIP: ABNORMAL

## 2019-12-12 PROCEDURE — 87880 STREP A ASSAY W/OPTIC: CPT | Performed by: NURSE PRACTITIONER

## 2019-12-12 PROCEDURE — 99213 OFFICE O/P EST LOW 20 MIN: CPT | Performed by: NURSE PRACTITIONER

## 2019-12-12 PROCEDURE — 81003 URINALYSIS AUTO W/O SCOPE: CPT | Performed by: NURSE PRACTITIONER

## 2019-12-12 RX ORDER — PREGABALIN 75 MG/1
75 CAPSULE ORAL 3 TIMES DAILY
COMMUNITY
End: 2021-10-08

## 2019-12-12 RX ORDER — AMOXICILLIN 500 MG/1
500 TABLET, FILM COATED ORAL 2 TIMES DAILY
Qty: 20 TABLET | Refills: 0 | Status: SHIPPED | OUTPATIENT
Start: 2019-12-12 | End: 2019-12-22

## 2019-12-12 NOTE — PROGRESS NOTES
Subjective     Marycarmen Gauthier is a 34 y.o. female.         Patient presents with:  URI  Flank Pain-intermittently for last 1.5 months.    Recently had 2 kidney stones.    URI    This is a new problem. The current episode started in the past 7 days. There has been no fever. Associated symptoms include congestion, coughing (mild, dry), a plugged ear sensation and a sore throat. Pertinent negatives include no abdominal pain, chest pain, ear pain, headaches, rhinorrhea, sinus pain, sneezing or wheezing. Treatments tried: zyrtec, flonase. The treatment provided mild relief.        The following portions of the patient's history were reviewed and updated as appropriate: allergies, current medications, past social history and problem list.    Review of Systems   HENT: Positive for congestion and sore throat. Negative for ear pain, rhinorrhea, sinus pain and sneezing.    Respiratory: Positive for cough (mild, dry). Negative for wheezing.    Cardiovascular: Negative for chest pain.   Gastrointestinal: Negative for abdominal pain.   Genitourinary: Positive for flank pain.   Neurological: Negative for headaches.       Objective     /80 (BP Location: Right arm, Patient Position: Sitting, Cuff Size: Adult)   Pulse 93   Temp 97.8 °F (36.6 °C)   Wt 81.6 kg (180 lb)   SpO2 98%   BMI 35.15 kg/m²     Physical Exam   Constitutional: She appears well-developed and well-nourished. No distress.   HENT:   Head: Normocephalic and atraumatic.   Right Ear: Hearing and tympanic membrane normal.   Left Ear: Hearing and tympanic membrane normal.   Nose: No mucosal edema or rhinorrhea.   Mouth/Throat: Uvula is midline and mucous membranes are normal. Posterior oropharyngeal erythema present. No oropharyngeal exudate. Tonsils are 3+ on the right. Tonsils are 3+ on the left. Tonsillar exudate.   Eyes: Conjunctivae are normal. Right eye exhibits no discharge. Left eye exhibits no discharge.   Cardiovascular: Normal rate, regular rhythm  and normal heart sounds.   No murmur heard.  Pulmonary/Chest: Effort normal and breath sounds normal. No tachypnea. She has no wheezes.   Lymphadenopathy:        Head (right side): No submental, no submandibular and no tonsillar adenopathy present.        Head (left side): No submental, no submandibular and no tonsillar adenopathy present.   Neurological: She is alert.   Skin: She is not diaphoretic.   Psychiatric: She has a normal mood and affect.   Vitals reviewed.      Assessment/Plan     Marycarmen was seen today for uri and flank pain.    Diagnoses and all orders for this visit:    Sore throat  -     POCT rapid strep A    Flank pain  -     Urinalysis With Microscopic If Indicated (No Culture) - Urine, Clean Catch    Strep throat  -     amoxicillin (AMOXIL) 500 MG tablet; Take 1 tablet by mouth 2 (Two) Times a Day for 10 days.    Rapid strep-pos, discussed with pt.    Increase fluid intake and rest.    Return for worsening of sx.

## 2020-01-07 ENCOUNTER — OFFICE VISIT (OUTPATIENT)
Dept: INTERNAL MEDICINE | Facility: CLINIC | Age: 35
End: 2020-01-07

## 2020-01-07 VITALS
BODY MASS INDEX: 36.12 KG/M2 | SYSTOLIC BLOOD PRESSURE: 128 MMHG | OXYGEN SATURATION: 98 % | HEIGHT: 60 IN | DIASTOLIC BLOOD PRESSURE: 90 MMHG | WEIGHT: 184 LBS | HEART RATE: 104 BPM

## 2020-01-07 DIAGNOSIS — G90.A POTS (POSTURAL ORTHOSTATIC TACHYCARDIA SYNDROME): Primary | Chronic | ICD-10-CM

## 2020-01-07 DIAGNOSIS — N20.0 NEPHROLITHIASIS: ICD-10-CM

## 2020-01-07 DIAGNOSIS — E78.01 FAMILIAL HYPERCHOLESTEROLEMIA: ICD-10-CM

## 2020-01-07 DIAGNOSIS — I10 ESSENTIAL HYPERTENSION: ICD-10-CM

## 2020-01-07 DIAGNOSIS — G62.9 SMALL FIBER NEUROPATHY: ICD-10-CM

## 2020-01-07 PROCEDURE — 99214 OFFICE O/P EST MOD 30 MIN: CPT | Performed by: INTERNAL MEDICINE

## 2020-01-07 RX ORDER — OXYCODONE HYDROCHLORIDE AND ACETAMINOPHEN 5; 325 MG/1; MG/1
1 TABLET ORAL EVERY 8 HOURS PRN
COMMUNITY
End: 2021-02-09

## 2020-01-07 RX ORDER — DULOXETINE 40 MG/1
CAPSULE, DELAYED RELEASE ORAL 2 TIMES DAILY
COMMUNITY
End: 2020-02-24

## 2020-01-07 NOTE — PROGRESS NOTES
"Subjective   Marycarmen Gauthier is a 35 y.o. female. Presents with a chief complaint of recent flare ups of nephrolithiasis, POTS Disease, Hyperlipidemia, small fiber neuropathy, HTN here for follow up and evaluation. Still very debilitated on a daily basis.      /90   Pulse 104   Ht 152.4 cm (60\")   Wt 83.5 kg (184 lb)   SpO2 98%   BMI 35.94 kg/m²     Body mass index is 35.94 kg/m².    History of Present Illness two episodes of nephrolithiasis within the last 2 months    The following portions of the patient's history were reviewed and updated as appropriate: allergies, current medications, past family history, past medical history, past social history, past surgical history and problem list.    Review of Systems   Constitutional: Positive for fatigue.   HENT: Positive for congestion.    Eyes: Negative.    Respiratory: Positive for shortness of breath.    Cardiovascular: Positive for palpitations.   Gastrointestinal: Negative.    Endocrine: Negative.    Genitourinary: Positive for dysuria.   Musculoskeletal: Positive for arthralgias and myalgias.   Skin: Negative.    Allergic/Immunologic: Negative.    Neurological: Positive for weakness.   Hematological: Negative.    Psychiatric/Behavioral: Negative.        Objective   Physical Exam   Constitutional: She is oriented to person, place, and time. She appears well-developed and well-nourished.   HENT:   Head: Normocephalic and atraumatic.   Eyes: Pupils are equal, round, and reactive to light. EOM are normal.   Neck: Normal range of motion. Neck supple.   Cardiovascular: Normal rate, regular rhythm and normal heart sounds.   Pulmonary/Chest: Effort normal and breath sounds normal.   Abdominal: Soft. Bowel sounds are normal.   Musculoskeletal:   Diffuse myalgias and arthralgias   Neurological: She is alert and oriented to person, place, and time.   Skin: Skin is warm and dry.   Psychiatric: She has a normal mood and affect. Her behavior is normal. Judgment and " thought content normal.   Nursing note and vitals reviewed.        Assessment/Plan   Marycarmen was seen today for hypertension and pots.    Diagnoses and all orders for this visit:    POTS (postural orthostatic tachycardia syndrome)  Comments:  stable but still debilitating    Familial hypercholesterolemia  Comments:  check a lipid profile    Essential hypertension  Comments:  moderately well controlled    Small fiber neuropathy  Comments:  stable for now    Nephrolithiasis  Comments:  urology follow up  Orders:  -     Ambulatory Referral to Urology                 Answers for HPI/ROS submitted by the patient on 1/6/2020   How long have you been having these symptoms?: Other

## 2020-02-06 ENCOUNTER — OFFICE VISIT (OUTPATIENT)
Dept: INTERNAL MEDICINE | Facility: CLINIC | Age: 35
End: 2020-02-06

## 2020-02-06 VITALS
SYSTOLIC BLOOD PRESSURE: 124 MMHG | HEART RATE: 129 BPM | DIASTOLIC BLOOD PRESSURE: 70 MMHG | HEIGHT: 60 IN | WEIGHT: 184 LBS | TEMPERATURE: 98.9 F | BODY MASS INDEX: 36.12 KG/M2 | OXYGEN SATURATION: 98 %

## 2020-02-06 DIAGNOSIS — R05.9 COUGH: Primary | ICD-10-CM

## 2020-02-06 PROCEDURE — 99213 OFFICE O/P EST LOW 20 MIN: CPT | Performed by: NURSE PRACTITIONER

## 2020-02-06 RX ORDER — BENZONATATE 200 MG/1
200 CAPSULE ORAL 3 TIMES DAILY PRN
Qty: 30 CAPSULE | Refills: 0 | Status: SHIPPED | OUTPATIENT
Start: 2020-02-06 | End: 2020-02-24

## 2020-02-06 NOTE — PROGRESS NOTES
"Subjective     Marycarmen Gauthier is a 35 y.o. female.         Patient presents with:  Cough  Nasal Congestion    Her  had the flu last week. She did get the flu shot.    Cough   This is a new problem. The current episode started in the past 7 days. The problem has been gradually improving. The cough is productive of sputum. Associated symptoms include nasal congestion, postnasal drip, rhinorrhea, shortness of breath and wheezing. Pertinent negatives include no chest pain, ear congestion, ear pain, fever or sore throat. Treatments tried: asmanex, allegra, singulair, nasonex,  The treatment provided mild relief. Her past medical history is significant for environmental allergies.        The following portions of the patient's history were reviewed and updated as appropriate: allergies, current medications, past social history and problem list.    Review of Systems   Constitutional: Negative for fever.   HENT: Positive for postnasal drip and rhinorrhea. Negative for ear pain and sore throat.    Respiratory: Positive for cough, shortness of breath and wheezing.    Cardiovascular: Negative for chest pain.   Allergic/Immunologic: Positive for environmental allergies.       Objective     /70 (BP Location: Left arm, Patient Position: Sitting, Cuff Size: Adult)   Pulse (!) 129   Temp 98.9 °F (37.2 °C)   Ht 152.4 cm (60\")   Wt 83.5 kg (184 lb)   SpO2 98%   BMI 35.94 kg/m²     Physical Exam   Constitutional: She appears well-developed and well-nourished. No distress.   HENT:   Head: Normocephalic and atraumatic.   Right Ear: Hearing and tympanic membrane normal.   Left Ear: Hearing and tympanic membrane normal.   Nose: No mucosal edema or rhinorrhea.   Mouth/Throat: Uvula is midline and mucous membranes are normal. No oropharyngeal exudate or posterior oropharyngeal erythema (clear hypersecretions). No tonsillar exudate.   Eyes: Conjunctivae are normal. Right eye exhibits no discharge. Left eye exhibits no " discharge.   Cardiovascular: Normal rate, regular rhythm and normal heart sounds.   No murmur heard.  Pulmonary/Chest: Effort normal and breath sounds normal. No tachypnea. She has no decreased breath sounds. She has no wheezes. She has no rhonchi. She has no rales.   Lymphadenopathy:        Head (right side): No submental, no submandibular and no tonsillar adenopathy present.        Head (left side): No submental, no submandibular and no tonsillar adenopathy present.   Neurological: She is alert.   Skin: She is not diaphoretic.   Psychiatric: She has a normal mood and affect.   Vitals reviewed.      Assessment/Plan     Marycarmen was seen today for cough and nasal congestion.    Diagnoses and all orders for this visit:    Cough  -     benzonatate (TESSALON) 200 MG capsule; Take 1 capsule by mouth 3 (Three) Times a Day As Needed for Cough.    Continue allergy regimen.    She is limited on medications to take due to her extensive PMH that includes POTS, IgA deficiency.    Increase fluid intake and rest.    Return for worsening of sx.

## 2020-02-10 ENCOUNTER — PATIENT MESSAGE (OUTPATIENT)
Dept: INTERNAL MEDICINE | Facility: CLINIC | Age: 35
End: 2020-02-10

## 2020-02-11 ENCOUNTER — OFFICE VISIT (OUTPATIENT)
Dept: INTERNAL MEDICINE | Facility: CLINIC | Age: 35
End: 2020-02-11

## 2020-02-11 VITALS
WEIGHT: 185 LBS | DIASTOLIC BLOOD PRESSURE: 94 MMHG | BODY MASS INDEX: 36.32 KG/M2 | OXYGEN SATURATION: 98 % | HEART RATE: 89 BPM | HEIGHT: 60 IN | SYSTOLIC BLOOD PRESSURE: 122 MMHG

## 2020-02-11 DIAGNOSIS — J06.9 UPPER RESPIRATORY TRACT INFECTION, UNSPECIFIED TYPE: Primary | ICD-10-CM

## 2020-02-11 PROCEDURE — 99213 OFFICE O/P EST LOW 20 MIN: CPT | Performed by: NURSE PRACTITIONER

## 2020-02-11 RX ORDER — AZELASTINE 1 MG/ML
2 SPRAY, METERED NASAL 2 TIMES DAILY
Qty: 30 ML | Refills: 3 | Status: SHIPPED | OUTPATIENT
Start: 2020-02-11

## 2020-02-11 RX ORDER — AMOXICILLIN AND CLAVULANATE POTASSIUM 875; 125 MG/1; MG/1
1 TABLET, FILM COATED ORAL EVERY 12 HOURS SCHEDULED
Qty: 14 TABLET | Refills: 0 | Status: SHIPPED | OUTPATIENT
Start: 2020-02-11 | End: 2020-02-18

## 2020-02-11 NOTE — PROGRESS NOTES
"Subjective     Marycarmen Gauthier is a 35 y.o. female.         She presents for worsening cough after being seen last week for same sx. She was to continue her inhaler and allergy regimen and add tessalon perles.    Cough   This is a new problem. The current episode started 1 to 4 weeks ago. The problem has been gradually worsening. The cough is productive of sputum. Associated symptoms include headaches, nasal congestion, postnasal drip, rhinorrhea, shortness of breath and wheezing. Pertinent negatives include no chest pain, ear congestion, ear pain, fever or sore throat. The treatment provided no relief.      Current Outpatient Medications on File Prior to Visit   Medication Sig Dispense Refill   • 7-Keto DHEA powder      • acetaminophen (TYLENOL) 650 MG 8 hr tablet Take 650 mg by mouth As Needed for Mild Pain .     • Acetylcysteine ( PO)      • ALBUTEROL SULFATE HFA IN Inhale.     • Alpha-Lipoic Acid 100 MG capsule Take  by mouth 2 (Two) Times a Day.     • ASHWAGANDHA PO Take 450 mg by mouth Daily.     • BD INSULIN SYRINGE U/F 31G X 5/16\" 0.5 ML misc      • benzonatate (TESSALON) 200 MG capsule Take 1 capsule by mouth 3 (Three) Times a Day As Needed for Cough. 30 capsule 0   • Cholecalciferol (VITAMIN D-3) 1000 units capsule Take 1,000 Units by mouth Daily.     • Cobamamide (ADENOSYLCOBALAMIN) powder      • Cyanocobalamin (B-12 PO) B12     • cycloSPORINE (RESTASIS) 0.05 % ophthalmic emulsion 1 drop 2 (Two) Times a Day.     • diclofenac (VOLTAREN) 1 % gel gel Apply 4 g topically to the appropriate area as directed 4 (Four) Times a Day As Needed.     • DULoxetine HCl 40 MG capsule delayed-release particles Take  by mouth 2 (Two) Times a Day.     • Fexofenadine HCl (ALLEGRA ALLERGY PO) Take 180 mg by mouth As Needed.     • hydrocortisone 2.5 % cream Apply  topically to the appropriate area as directed 2 (Two) Times a Day.     • Ibuprofen (ADVIL PO) Take 1 tablet by mouth Daily.     • L-THEANINE PO Take 200 mg " by mouth 2 (Two) Times a Day.     • mometasone (NASONEX) 50 MCG/ACT nasal spray 2 sprays into the nostril(s) as directed by provider Daily.     • montelukast (SINGULAIR) 10 MG tablet TAKE 1 TABLET BY MOUTH EVERY DAY AT NIGHT 90 tablet 1   • Multiple Vitamin (MULTI-VITAMIN DAILY PO) Take 1 tablet by mouth Daily.     • Omega-3 Fatty Acids (OMEGA-3 FISH OIL PO) Take 1 tablet by mouth Daily.     • Omeprazole 20 MG tablet delayed-release Take 40 mg by mouth Daily.     • oxyCODONE-acetaminophen (PERCOCET) 5-325 MG per tablet Take 1 tablet by mouth Every 8 (Eight) Hours As Needed for Moderate Pain .     • Polyvinyl Alcohol-Povidone (REFRESH OP) Apply  to eye(s) as directed by provider 2 (Two) Times a Day.     • pregabalin (LYRICA) 75 MG capsule Take 75 mg by mouth 3 (Three) Times a Day.     • promethazine (PHENERGAN) 25 MG tablet Take 1 tablet by mouth Every 6 (Six) Hours As Needed for Nausea or Vomiting. 20 tablet 0   • Semaglutide (OZEMPIC) 1 MG/DOSE solution pen-injector Inject 1 mg under the skin into the appropriate area as directed 1 (One) Time Per Week.     • Sennosides-Docusate Sodium (ONEL-COLACE PO) Take  by mouth As Needed.     • terconazole (TERAZOL 3) 0.8 % vaginal cream INSERT 1 APPLICATORFUL VAGINALLY EVERY DAY AT NIGHT  1   • valACYclovir (VALTREX) 500 MG tablet TAKE 1 (ONE) TABLET TWICE A DAY  0   • vitamin C (ASCORBIC ACID) 250 MG tablet Take 1,000 mg by mouth Daily.       No current facility-administered medications on file prior to visit.        The following portions of the patient's history were reviewed and updated as appropriate: allergies, current medications, past social history and problem list.    Review of Systems   Constitutional: Negative for fever.   HENT: Positive for congestion, postnasal drip, rhinorrhea and sinus pressure. Negative for ear pain, sinus pain and sore throat.    Respiratory: Positive for cough, shortness of breath and wheezing.    Cardiovascular: Negative for chest pain.  "  Neurological: Positive for headaches.       Objective     /94   Pulse 89   Ht 152.4 cm (60\")   Wt 83.9 kg (185 lb)   SpO2 98%   BMI 36.13 kg/m²     Physical Exam   Constitutional: She appears well-developed and well-nourished. No distress.   HENT:   Head: Normocephalic and atraumatic.   Nose: No mucosal edema or rhinorrhea.   Mouth/Throat: Uvula is midline and mucous membranes are normal. No oropharyngeal exudate or posterior oropharyngeal erythema. Tonsillar abscesses: clear hypersecretions. No tonsillar exudate.   Eyes: Conjunctivae are normal. Right eye exhibits no discharge. Left eye exhibits no discharge.   Cardiovascular: Normal rate, regular rhythm and normal heart sounds.   No murmur heard.  Pulmonary/Chest: Effort normal and breath sounds normal. No tachypnea. She has no decreased breath sounds. She has no wheezes. She has no rhonchi.   Lymphadenopathy:        Head (right side): No submental, no submandibular and no tonsillar adenopathy present.        Head (left side): No submental, no submandibular and no tonsillar adenopathy present.   Neurological: She is alert.   Skin: She is not diaphoretic.   Psychiatric: She has a normal mood and affect.   Vitals reviewed.      Assessment/Plan     Marycarmen was seen today for cough.    Diagnoses and all orders for this visit:    Upper respiratory tract infection, unspecified type  -     amoxicillin-clavulanate (AUGMENTIN) 875-125 MG per tablet; Take 1 tablet by mouth Every 12 (Twelve) Hours for 7 days.  -     azelastine (ASTELIN) 0.1 % nasal spray; 2 sprays into the nostril(s) as directed by provider 2 (Two) Times a Day. Use in each nostril as directed    Continue inhalers and allergy regimen.    Increase fluid intake and rest. Can drink pedialyte.    Return for worsening of sx.           "

## 2020-02-13 ENCOUNTER — PATIENT MESSAGE (OUTPATIENT)
Dept: INTERNAL MEDICINE | Facility: CLINIC | Age: 35
End: 2020-02-13

## 2020-02-14 ENCOUNTER — TELEPHONE (OUTPATIENT)
Dept: INTERNAL MEDICINE | Facility: CLINIC | Age: 35
End: 2020-02-14

## 2020-02-14 DIAGNOSIS — J06.9 UPPER RESPIRATORY TRACT INFECTION, UNSPECIFIED TYPE: Primary | ICD-10-CM

## 2020-02-14 DIAGNOSIS — J40 BRONCHITIS: ICD-10-CM

## 2020-02-14 RX ORDER — PREDNISONE 20 MG/1
40 TABLET ORAL DAILY
Qty: 10 TABLET | Refills: 0 | Status: SHIPPED | OUTPATIENT
Start: 2020-02-14 | End: 2020-02-19

## 2020-02-14 NOTE — TELEPHONE ENCOUNTER
Spoke to pt about her continued cough. We will now try steroids since her cough and inflammation to airway is not improving despite inhalers, abx, allergy meds.

## 2020-02-24 ENCOUNTER — OFFICE VISIT (OUTPATIENT)
Dept: INTERNAL MEDICINE | Facility: CLINIC | Age: 35
End: 2020-02-24

## 2020-02-24 VITALS
DIASTOLIC BLOOD PRESSURE: 84 MMHG | TEMPERATURE: 98.4 F | WEIGHT: 183 LBS | BODY MASS INDEX: 35.74 KG/M2 | SYSTOLIC BLOOD PRESSURE: 120 MMHG

## 2020-02-24 DIAGNOSIS — R11.0 NAUSEA: ICD-10-CM

## 2020-02-24 DIAGNOSIS — J06.9 ACUTE URI: ICD-10-CM

## 2020-02-24 DIAGNOSIS — R19.7 DIARRHEA, UNSPECIFIED TYPE: Primary | ICD-10-CM

## 2020-02-24 PROCEDURE — 99213 OFFICE O/P EST LOW 20 MIN: CPT | Performed by: NURSE PRACTITIONER

## 2020-02-24 RX ORDER — FLUOXETINE HYDROCHLORIDE 20 MG/1
CAPSULE ORAL DAILY
COMMUNITY
Start: 2020-02-24 | End: 2021-02-09

## 2020-02-24 RX ORDER — ONDANSETRON 4 MG/1
4 TABLET, FILM COATED ORAL EVERY 8 HOURS PRN
Qty: 30 TABLET | Refills: 0 | OUTPATIENT
Start: 2020-02-24 | End: 2021-01-05

## 2020-02-24 NOTE — PROGRESS NOTES
Subjective     Marycarmen Gauthier is a 35 y.o. female.         She presents for continued URI sx for the last few weeks that now seems to be improving and new diarrhea that started 10 days ago. She did have Augmentin use about 1 month ago and fears she may have c.diff. She does report a weird odor to her stool but she does report her diarrhea is better today. 1.5 weeks ago we started her on steroids due to persistent URI sx but she was not able to take but one day worth because her BP was elevated. She does have extensive medical history that includes POTS and reactive airway disease.    URI    This is a new problem. The current episode started 1 to 4 weeks ago. The problem has been gradually improving. There has been no fever. Associated symptoms include congestion, coughing (dry), diarrhea, headaches and rhinorrhea. Pertinent negatives include no abdominal pain, ear pain, nausea, plugged ear sensation, sinus pain, sore throat, swollen glands, vomiting or wheezing. Treatments tried: allergy regimen.   Diarrhea    This is a new problem. Episode onset: 11 days ago. The problem occurs 5 to 10 times per day. The problem has been gradually improving. The stool consistency is described as watery. Associated symptoms include chills, coughing (dry), headaches, sweats and a URI. Pertinent negatives include no abdominal pain, bloating, fever, increased  flatus, myalgias or vomiting. Risk factors include recent antibiotic use. She has tried electrolyte solution and increased fluids for the symptoms. The treatment provided mild relief.        The following portions of the patient's history were reviewed and updated as appropriate: allergies, current medications, past social history and problem list.    Review of Systems   Constitutional: Positive for chills. Negative for fever.   HENT: Positive for congestion and rhinorrhea. Negative for ear pain, sinus pain and sore throat.    Respiratory: Positive for cough (dry). Negative for  wheezing.    Gastrointestinal: Positive for diarrhea. Negative for abdominal pain, bloating, flatus, nausea and vomiting.   Musculoskeletal: Negative for myalgias.   Neurological: Positive for headaches.     Current Outpatient Medications on File Prior to Visit   Medication Sig Dispense Refill   • acetaminophen (TYLENOL) 650 MG 8 hr tablet Take 650 mg by mouth As Needed for Mild Pain .     • ALBUTEROL SULFATE HFA IN Inhale.     • azelastine (ASTELIN) 0.1 % nasal spray 2 sprays into the nostril(s) as directed by provider 2 (Two) Times a Day. Use in each nostril as directed 30 mL 3   • Cholecalciferol (VITAMIN D-3) 1000 units capsule Take 1,000 Units by mouth Daily.     • Cyanocobalamin (B-12 PO) B12     • cycloSPORINE (RESTASIS) 0.05 % ophthalmic emulsion 1 drop 2 (Two) Times a Day.     • diclofenac (VOLTAREN) 1 % gel gel Apply 4 g topically to the appropriate area as directed 4 (Four) Times a Day As Needed.     • Fexofenadine HCl (ALLEGRA ALLERGY PO) Take 180 mg by mouth As Needed.     • FLUoxetine (PROzac) 20 MG capsule Daily.     • hydrocortisone 2.5 % cream Apply  topically to the appropriate area as directed 2 (Two) Times a Day.     • Ibuprofen (ADVIL PO) Take 1 tablet by mouth Daily.     • L-THEANINE PO Take 200 mg by mouth 2 (Two) Times a Day.     • montelukast (SINGULAIR) 10 MG tablet TAKE 1 TABLET BY MOUTH EVERY DAY AT NIGHT 90 tablet 1   • Multiple Vitamin (MULTI-VITAMIN DAILY PO) Take 1 tablet by mouth Daily.     • Omega-3 Fatty Acids (OMEGA-3 FISH OIL PO) Take 1 tablet by mouth Daily.     • Omeprazole 20 MG tablet delayed-release Take 40 mg by mouth Daily.     • oxyCODONE-acetaminophen (PERCOCET) 5-325 MG per tablet Take 1 tablet by mouth Every 8 (Eight) Hours As Needed for Moderate Pain .     • Polyvinyl Alcohol-Povidone (REFRESH OP) Apply  to eye(s) as directed by provider 2 (Two) Times a Day.     • pregabalin (LYRICA) 75 MG capsule Take 75 mg by mouth 3 (Three) Times a Day.     • Sennosides-Docusate  "Sodium (ONEL-COLACE PO) Take  by mouth As Needed.     • valACYclovir (VALTREX) 500 MG tablet TAKE 1 (ONE) TABLET TWICE A DAY  0   • vitamin C (ASCORBIC ACID) 250 MG tablet Take 1,000 mg by mouth Daily.     • [DISCONTINUED] 7-Keto DHEA powder      • [DISCONTINUED] Acetylcysteine ( PO)      • [DISCONTINUED] Alpha-Lipoic Acid 100 MG capsule Take  by mouth 2 (Two) Times a Day.     • [DISCONTINUED] ASHWAGANDHA PO Take 450 mg by mouth Daily.     • [DISCONTINUED] BD INSULIN SYRINGE U/F 31G X 5/16\" 0.5 ML misc      • [DISCONTINUED] benzonatate (TESSALON) 200 MG capsule Take 1 capsule by mouth 3 (Three) Times a Day As Needed for Cough. 30 capsule 0   • [DISCONTINUED] Cobamamide (ADENOSYLCOBALAMIN) powder      • [DISCONTINUED] DULoxetine HCl 40 MG capsule delayed-release particles Take  by mouth 2 (Two) Times a Day.     • [DISCONTINUED] mometasone (NASONEX) 50 MCG/ACT nasal spray 2 sprays into the nostril(s) as directed by provider Daily.     • [DISCONTINUED] promethazine (PHENERGAN) 25 MG tablet Take 1 tablet by mouth Every 6 (Six) Hours As Needed for Nausea or Vomiting. 20 tablet 0   • [DISCONTINUED] Semaglutide (OZEMPIC) 1 MG/DOSE solution pen-injector Inject 1 mg under the skin into the appropriate area as directed 1 (One) Time Per Week.     • [DISCONTINUED] terconazole (TERAZOL 3) 0.8 % vaginal cream INSERT 1 APPLICATORFUL VAGINALLY EVERY DAY AT NIGHT  1     No current facility-administered medications on file prior to visit.        Objective     /84 (BP Location: Left arm, Patient Position: Sitting, Cuff Size: Adult)   Temp 98.4 °F (36.9 °C)   Wt 83 kg (183 lb)   BMI 35.74 kg/m²     Physical Exam   Constitutional: She appears well-developed and well-nourished. No distress.   HENT:   Head: Normocephalic and atraumatic.   Nose: No mucosal edema or rhinorrhea.   Mouth/Throat: Uvula is midline and mucous membranes are normal. No oropharyngeal exudate or posterior oropharyngeal erythema. No tonsillar exudate. "   Eyes: Conjunctivae are normal. Right eye exhibits no discharge. Left eye exhibits no discharge.   Cardiovascular: Normal rate, regular rhythm and normal heart sounds.   No murmur heard.  Pulmonary/Chest: Effort normal and breath sounds normal. No tachypnea. She has no wheezes.   Abdominal: Soft. Normal appearance and bowel sounds are normal. There is no tenderness.   Lymphadenopathy:        Head (right side): No submental, no submandibular and no tonsillar adenopathy present.        Head (left side): No submental, no submandibular and no tonsillar adenopathy present.   Neurological: She is alert.   Skin: She is not diaphoretic.   Psychiatric: She has a normal mood and affect.   Vitals reviewed.      Assessment/Plan     Marycarmen was seen today for uri and diarrhea.    Diagnoses and all orders for this visit:    Diarrhea, unspecified type  Comments:  improving  Orders:  -     Stool Culture (Reference Lab) - Stool, Per Rectum; Future  -     Clostridium Difficile Toxin, PCR - Stool, Per Rectum; Future  -     CBC & Differential  -     Comprehensive Metabolic Panel  -     Urinalysis With Culture If Indicated - Urine, Clean Catch    Nausea  -     ondansetron (ZOFRAN) 4 MG tablet; Take 1 tablet by mouth Every 8 (Eight) Hours As Needed for Nausea or Vomiting.    Acute URI  Comments:  improving    Continue allergy regimen, increased fluids and gatorade.    Can use imodium and pepto for continued diarrhea.    Return for worsening of sx.

## 2020-02-25 ENCOUNTER — LAB (OUTPATIENT)
Dept: INTERNAL MEDICINE | Facility: CLINIC | Age: 35
End: 2020-02-25

## 2020-02-25 DIAGNOSIS — R19.7 DIARRHEA, UNSPECIFIED TYPE: Primary | ICD-10-CM

## 2020-02-25 LAB
ALBUMIN SERPL-MCNC: 4.1 G/DL (ref 3.5–5.2)
ALBUMIN/GLOB SERPL: 1.3 G/DL
ALP SERPL-CCNC: 86 U/L (ref 39–117)
ALT SERPL-CCNC: 30 U/L (ref 1–33)
APPEARANCE UR: CLEAR
AST SERPL-CCNC: 29 U/L (ref 1–32)
BACTERIA #/AREA URNS HPF: ABNORMAL /HPF
BASOPHILS # BLD AUTO: 0.02 10*3/MM3 (ref 0–0.2)
BASOPHILS NFR BLD AUTO: 0.2 % (ref 0–1.5)
BILIRUB SERPL-MCNC: 0.2 MG/DL (ref 0.2–1.2)
BILIRUB UR QL STRIP: NEGATIVE
BUN SERPL-MCNC: 8 MG/DL (ref 6–20)
BUN/CREAT SERPL: 12.3 (ref 7–25)
CALCIUM SERPL-MCNC: 9 MG/DL (ref 8.6–10.5)
CHLORIDE SERPL-SCNC: 100 MMOL/L (ref 98–107)
CO2 SERPL-SCNC: 25.2 MMOL/L (ref 22–29)
COLOR UR: YELLOW
CREAT SERPL-MCNC: 0.65 MG/DL (ref 0.57–1)
CRYSTALS URNS MICRO: ABNORMAL
EOSINOPHIL # BLD AUTO: 0.09 10*3/MM3 (ref 0–0.4)
EOSINOPHIL NFR BLD AUTO: 1.1 % (ref 0.3–6.2)
EPI CELLS #/AREA URNS HPF: ABNORMAL /HPF (ref 0–10)
ERYTHROCYTE [DISTWIDTH] IN BLOOD BY AUTOMATED COUNT: 13.6 % (ref 12.3–15.4)
GLOBULIN SER CALC-MCNC: 3.1 GM/DL
GLUCOSE SERPL-MCNC: 85 MG/DL (ref 65–99)
GLUCOSE UR QL: NEGATIVE
HCT VFR BLD AUTO: 38.8 % (ref 34–46.6)
HGB BLD-MCNC: 13.3 G/DL (ref 12–15.9)
HGB UR QL STRIP: NEGATIVE
IMM GRANULOCYTES # BLD AUTO: 0.08 10*3/MM3 (ref 0–0.05)
IMM GRANULOCYTES NFR BLD AUTO: 0.9 % (ref 0–0.5)
KETONES UR QL STRIP: NEGATIVE
LEUKOCYTE ESTERASE UR QL STRIP: NEGATIVE
LYMPHOCYTES # BLD AUTO: 3.12 10*3/MM3 (ref 0.7–3.1)
LYMPHOCYTES NFR BLD AUTO: 36.7 % (ref 19.6–45.3)
MCH RBC QN AUTO: 31.4 PG (ref 26.6–33)
MCHC RBC AUTO-ENTMCNC: 34.3 G/DL (ref 31.5–35.7)
MCV RBC AUTO: 91.7 FL (ref 79–97)
MICRO URNS: NORMAL
MICRO URNS: NORMAL
MONOCYTES # BLD AUTO: 0.66 10*3/MM3 (ref 0.1–0.9)
MONOCYTES NFR BLD AUTO: 7.8 % (ref 5–12)
MUCOUS THREADS URNS QL MICRO: PRESENT /HPF
NEUTROPHILS # BLD AUTO: 4.52 10*3/MM3 (ref 1.7–7)
NEUTROPHILS NFR BLD AUTO: 53.3 % (ref 42.7–76)
NITRITE UR QL STRIP: NEGATIVE
NRBC BLD AUTO-RTO: 0 /100 WBC (ref 0–0.2)
PH UR STRIP: 6 [PH] (ref 5–7.5)
PLATELET # BLD AUTO: 388 10*3/MM3 (ref 140–450)
POTASSIUM SERPL-SCNC: 3.9 MMOL/L (ref 3.5–5.2)
PROT SERPL-MCNC: 7.2 G/DL (ref 6–8.5)
PROT UR QL STRIP: NEGATIVE
RBC # BLD AUTO: 4.23 10*6/MM3 (ref 3.77–5.28)
RBC #/AREA URNS HPF: ABNORMAL /HPF (ref 0–2)
SODIUM SERPL-SCNC: 139 MMOL/L (ref 136–145)
SP GR UR: 1.02 (ref 1–1.03)
UNIDENT CRYS URNS QL MICRO: PRESENT /LPF
URINALYSIS REFLEX: NORMAL
UROBILINOGEN UR STRIP-MCNC: 0.2 MG/DL (ref 0.2–1)
WBC # BLD AUTO: 8.49 10*3/MM3 (ref 3.4–10.8)
WBC #/AREA URNS HPF: ABNORMAL /HPF (ref 0–5)
YEAST #/AREA URNS HPF: PRESENT /HPF

## 2020-02-29 LAB
BACTERIA SPEC CULT: NORMAL
BACTERIA SPEC CULT: NORMAL
C DIFF TOX GENS STL QL NAA+PROBE: NEGATIVE
CAMPYLOBACTER STL CULT: NORMAL
E COLI SXT STL QL IA: NEGATIVE
SALM + SHIG STL CULT: NORMAL

## 2020-03-01 ENCOUNTER — PATIENT MESSAGE (OUTPATIENT)
Dept: INTERNAL MEDICINE | Facility: CLINIC | Age: 35
End: 2020-03-01

## 2020-03-02 NOTE — TELEPHONE ENCOUNTER
From: Marycarmen Gauthier  To: Kylie Mcconnell APRN  Sent: 3/1/2020 4:19 PM EST  Subject: Test Results Question    Hi Kylie,  Yesterday I felt up to doing normal activities (son’s basketball game, lunch out) but I’ve been back to needing to be horizontal/nauseated today. I am still having diarrhea several time a day.   I’m hoping the stool specimen tells what’s going on. Thought I’d check in and see if results were back.   Thanks,  Marycarmen

## 2020-03-13 ENCOUNTER — TELEPHONE (OUTPATIENT)
Dept: INTERNAL MEDICINE | Facility: CLINIC | Age: 35
End: 2020-03-13

## 2020-03-13 DIAGNOSIS — I10 ESSENTIAL HYPERTENSION: Primary | ICD-10-CM

## 2020-03-13 RX ORDER — AMLODIPINE BESYLATE 10 MG/1
10 TABLET ORAL DAILY
Qty: 90 TABLET | Refills: 0 | Status: SHIPPED | OUTPATIENT
Start: 2020-03-13 | End: 2021-02-09

## 2020-03-13 NOTE — TELEPHONE ENCOUNTER
Spoke to pt on phone. Her BP has been elevated as seen in her previous mychart note to the office. She has been taking amlodipine 10 mg that she had left over from when she previous took it and propanolol 10 mg 1-2 times daily. Propanolol had recently been stopped due to the fact that it could've been causing SOB. Her BP was more controlled on that regimen (readings in previous telephone note) so she should continue this regimen. I did not think she needed to come to the office given the spread of coronavirus and her extensive PMH. Rx sent for amlodipine 10 mg. She will continue to monitor her BP and let us know if she has any other problems. In the meantime, she is working on contacting her immunologist to figure out an alternate therapy for the propanolol. He originally ordered guanfacine but she reports it was denied by her insurance.

## 2020-03-13 NOTE — TELEPHONE ENCOUNTER
Contacted Ms. Gauthier in regards to coming into the office at this time due to her underling conditions.   She states she is currently taking Amlodipine 10 mg qam and Propanolol 10 mg 1-2 daily and it is keeping her blood pressure under control.  Her previous readings where 115/83 heart rate 86,  110/85 heart rate 111,  117/90 heart rate 113.  Patient states she only has 5-6 days left of the Amoldipine and will need to send a prescription to [facility] @ [phone].  Please advise    Patient # [phone]

## 2020-03-31 ENCOUNTER — TELEPHONE (OUTPATIENT)
Dept: INTERNAL MEDICINE | Facility: CLINIC | Age: 35
End: 2020-03-31

## 2020-03-31 ENCOUNTER — TELEPHONE (OUTPATIENT)
Dept: CARDIOLOGY | Facility: CLINIC | Age: 35
End: 2020-03-31

## 2020-03-31 NOTE — TELEPHONE ENCOUNTER
Larisa [facility] called stating that they sent in FMLA/Disability paperwork on this patient on 03/20/2020. Did not know how to check and see if anyone has seen this? Please advise.     Lebron call back : [phone]     Lebron Patient's Case #: [id number]

## 2020-03-31 NOTE — TELEPHONE ENCOUNTER
Larisa from [facility] called and states that the she fax a letter requesting pt medical records.      Called Larisa back and l/m for to re fax it to [phone] or [phone]

## 2020-04-02 NOTE — TELEPHONE ENCOUNTER
Uncompleted FMLA  (empty forms) was scanned in to her chart 03/20. FMLA paper needs to filled out, I did it partially, in your folder to be completed.

## 2020-05-15 RX ORDER — MONTELUKAST SODIUM 10 MG/1
TABLET ORAL
Qty: 90 TABLET | Refills: 1 | Status: SHIPPED | OUTPATIENT
Start: 2020-05-15 | End: 2022-10-31 | Stop reason: SDUPTHER

## 2020-10-20 ENCOUNTER — TELEPHONE (OUTPATIENT)
Dept: INTERNAL MEDICINE | Facility: CLINIC | Age: 35
End: 2020-10-20

## 2020-10-20 NOTE — TELEPHONE ENCOUNTER
Patient notified to please go to urgent care for further evaluation. She verbalized understanding and will be going for testing

## 2020-10-20 NOTE — TELEPHONE ENCOUNTER
Caller: Claudia Butchernifer KIARA    Relationship to patient: Self      Best call back number: [phone]        Concerns or Questions if Applicable:    MS. BUTCHER HAS A SORE THROAT, COUGH FOR 4 DAYS , SHE WOULD LIKE TO KNOW IF SHE SHOULD GET COVID TESTED      Travel screen questions: NA

## 2020-10-23 ENCOUNTER — TELEPHONE (OUTPATIENT)
Dept: INTERNAL MEDICINE | Facility: CLINIC | Age: 35
End: 2020-10-23

## 2021-01-12 ENCOUNTER — TELEMEDICINE (OUTPATIENT)
Dept: INTERNAL MEDICINE | Facility: CLINIC | Age: 36
End: 2021-01-12

## 2021-01-12 DIAGNOSIS — J30.1 CHRONIC SEASONAL ALLERGIC RHINITIS DUE TO POLLEN: ICD-10-CM

## 2021-01-12 DIAGNOSIS — K21.9 GASTROESOPHAGEAL REFLUX DISEASE WITHOUT ESOPHAGITIS: ICD-10-CM

## 2021-01-12 DIAGNOSIS — E78.01 FAMILIAL HYPERCHOLESTEROLEMIA: ICD-10-CM

## 2021-01-12 DIAGNOSIS — I10 ESSENTIAL HYPERTENSION: ICD-10-CM

## 2021-01-12 DIAGNOSIS — G90.A POTS (POSTURAL ORTHOSTATIC TACHYCARDIA SYNDROME): Primary | ICD-10-CM

## 2021-01-12 PROCEDURE — 99214 OFFICE O/P EST MOD 30 MIN: CPT | Performed by: INTERNAL MEDICINE

## 2021-01-12 NOTE — PROGRESS NOTES
Subjective   Marycarmen Gauthier is a 36 y.o. female.  Patient presents by video with a chief complaint of pots disease that is well controlled, hyperlipidemia, episodic hypertension, chronic allergic rhinitis, hypermobility syndrome, gastroesophageal reflux disease, here for follow-up evaluation and treatment.  She is working with a physician at [facility] who has adjusted her medications to support her pots disease and she is doing substantially better.  I have no suggestions for any changes at this time.      There were no vitals taken for this visit.    There is no height or weight on file to calculate BMI.    History of Present Illness able to manage her current medical issues fairly well    The following portions of the patient's history were reviewed and updated as appropriate: allergies, current medications, past family history, past medical history, past social history, past surgical history and problem list.    Review of Systems   Constitutional: Positive for fatigue.   HENT: Negative.    Respiratory: Negative.    Cardiovascular: Positive for palpitations.   Gastrointestinal: Negative.    Musculoskeletal: Positive for arthralgias and myalgias.   Neurological: Negative.    Psychiatric/Behavioral: Negative.        Objective   Physical Exam  Nursing note reviewed.   Constitutional:       Appearance: Normal appearance.   HENT:      Head: Normocephalic and atraumatic.   Cardiovascular:      Comments: Episodic palpitations  Pulmonary:      Comments: No pulmonary complaints  Abdominal:      Comments: No abdominal complaints at this time   Neurological:      Mental Status: She is alert.      Comments: No neurological complaints           Assessment/Plan   Diagnoses and all orders for this visit:    1. POTS (postural orthostatic tachycardia syndrome) (Primary)  Comments:  managing well at this point    2. Familial hypercholesterolemia  Comments:  check a CMP and lipid panel at her next visit    3. Essential  hypertension  Comments:  marginally well controlled at present    4. Chronic seasonal allergic rhinitis due to pollen  Comments:  doing better during the winter months    5. Gastroesophageal reflux disease without esophagitis  Comments:  very well controlled at present

## 2021-01-27 ENCOUNTER — TELEPHONE (OUTPATIENT)
Dept: CARDIOLOGY | Facility: CLINIC | Age: 36
End: 2021-01-27

## 2021-01-27 NOTE — TELEPHONE ENCOUNTER
Patient sent a message with new symptoms and diastolic hypertension.  Please call to schedule an appointment with me in the near future.  Thank you

## 2021-02-09 ENCOUNTER — OFFICE VISIT (OUTPATIENT)
Dept: INTERNAL MEDICINE | Facility: CLINIC | Age: 36
End: 2021-02-09

## 2021-02-09 VITALS
OXYGEN SATURATION: 98 % | TEMPERATURE: 98.6 F | WEIGHT: 196 LBS | HEART RATE: 100 BPM | DIASTOLIC BLOOD PRESSURE: 80 MMHG | SYSTOLIC BLOOD PRESSURE: 100 MMHG | HEIGHT: 60 IN | BODY MASS INDEX: 38.48 KG/M2

## 2021-02-09 DIAGNOSIS — E78.01 FAMILIAL HYPERCHOLESTEROLEMIA: ICD-10-CM

## 2021-02-09 DIAGNOSIS — R63.5 WEIGHT GAIN: ICD-10-CM

## 2021-02-09 DIAGNOSIS — Z11.59 SCREENING FOR VIRAL DISEASE: ICD-10-CM

## 2021-02-09 DIAGNOSIS — I10 ESSENTIAL HYPERTENSION: ICD-10-CM

## 2021-02-09 DIAGNOSIS — Z76.89 ENCOUNTER TO ESTABLISH CARE: Primary | ICD-10-CM

## 2021-02-09 DIAGNOSIS — D80.2 IGA DEFICIENCY, SELECTIVE (HCC): ICD-10-CM

## 2021-02-09 DIAGNOSIS — G90.A POTS (POSTURAL ORTHOSTATIC TACHYCARDIA SYNDROME): ICD-10-CM

## 2021-02-09 PROBLEM — K21.9 ACID REFLUX: Status: ACTIVE | Noted: 2019-08-24

## 2021-02-09 PROBLEM — G90.9 DISORDER OF AUTONOMIC NERVOUS SYSTEM: Status: ACTIVE | Noted: 2020-01-10

## 2021-02-09 PROBLEM — R42 DISEQUILIBRIUM: Status: ACTIVE | Noted: 2020-01-10

## 2021-02-09 PROCEDURE — 99214 OFFICE O/P EST MOD 30 MIN: CPT | Performed by: NURSE PRACTITIONER

## 2021-02-09 RX ORDER — GUANFACINE 1 MG/1
1.5 TABLET ORAL
COMMUNITY
Start: 2021-01-29 | End: 2021-03-05

## 2021-02-09 RX ORDER — FLUOXETINE HYDROCHLORIDE 60 MG/1
60 TABLET, FILM COATED ORAL; ORAL DAILY
COMMUNITY
Start: 2020-12-02

## 2021-02-09 NOTE — PROGRESS NOTES
Subjective     Marycarmen Gauthier is a 36 y.o. female.         Patient presents to [facility] care.  She formally saw Dr. Diaz who is now transferring care to me.  Patient is not new to me but I have seen her for only acute complaints.  She has an extensive past medical history including POTS, hypertension, hyperlipidemia, RAD, multiple arthralgias.  She is followed by many specialists including cardiology, neurology, psych, pediatric genetics specialist.  Patient no longer works due to POTS and is currently on disability.  She also was able to switch to Medicare late last year.  She does report she has had increased stress over the last month due to her child being diagnosed with autism (and working with [employer]) and her  having Covid.  She has been stress eating and has gained a significant amount of weight-about 15 pounds in 1 month.  He has not been exercising routinely.       The following portions of the patient's history were reviewed and updated as appropriate: allergies, current medications, past social history and problem list.    Past Medical History:   Diagnosis Date   • Allergic 1/1/1986   • Anxiety    • Asthma    • Autonomic dysfunction 2017   • Bulging lumbar disc 2015   • Chronic pain    • Chronic sinus infection    • Depression    • Dizziness    • VICKERS (dyspnea on exertion)    • Essential hypertension    • Fatigue    • Fibromyalgia    • Fibromyalgia, primary 1/1/2015   • Flexor hallucis longus tendinitis 06/2013   • GERD (gastroesophageal reflux disease) 1/1/1998   • H/O Bilateral carpal tunnel syndrome    • H/O transfusion of packed red blood cells    • History of abnormal cervical Pap smear 2011   • History of anemia    • History of infectious mononucleosis    • History of medical problems     Lichen Sclerosus, Hypermobility, herpes virus   • History of tachycardia     POTS   • Hyperlipidemia    • Hypermobile joint syndrome of multiple sites    • Hypermobile joints    • Irritable bowel  syndrome 2011   • Kidney stone     History of 2   • Leukocytosis    • Numbness and tingling in both hands    • Obesity    • Osteoarthritis    • Palpitations    • POTS (postural orthostatic tachycardia syndrome) 09/19/2017   • PROM (premature rupture of membranes)    • PTSD (post-traumatic stress disorder) 07/2014   • Seasonal allergies    • Snoring    • SOB (shortness of breath)    • Syncope and collapse    • Visual impairment 2014    [facility]         Current Outpatient Medications:   •  acetaminophen (TYLENOL) 650 MG 8 hr tablet, Take 650 mg by mouth As Needed for Mild Pain ., Disp: , Rfl:   •  ALBUTEROL SULFATE HFA IN, Inhale., Disp: , Rfl:   •  azelastine (ASTELIN) 0.1 % nasal spray, 2 sprays into the nostril(s) as directed by provider 2 (Two) Times a Day. Use in each nostril as directed, Disp: 30 mL, Rfl: 3  •  beclomethasone (QVAR) 40 MCG/ACT inhaler, Inhale 2 puffs 2 (Two) Times a Day., Disp: , Rfl:   •  Cyanocobalamin (B-12 PO), B12, Disp: , Rfl:   •  cycloSPORINE (RESTASIS) 0.05 % ophthalmic emulsion, 1 drop 2 (Two) Times a Day., Disp: , Rfl:   •  diclofenac (VOLTAREN) 1 % gel gel, Apply 4 g topically to the appropriate area as directed 4 (Four) Times a Day As Needed., Disp: , Rfl:   •  Fexofenadine HCl (ALLEGRA ALLERGY PO), Take 180 mg by mouth As Needed., Disp: , Rfl:   •  FLUoxetine (PROzac) 40 MG capsule, Take 40 mg by mouth Daily., Disp: , Rfl:   •  guanFACINE (TENEX) 1 MG tablet, Take 1.5 mg by mouth., Disp: , Rfl:   •  hydrocortisone 2.5 % cream, Apply  topically to the appropriate area as directed 2 (Two) Times a Day., Disp: , Rfl:   •  L-THEANINE PO, Take 200 mg by mouth 2 (Two) Times a Day., Disp: , Rfl:   •  mometasone (NASONEX) 50 MCG/ACT nasal spray, 2 sprays into the nostril(s) as directed by provider 2 (two) times a day., Disp: , Rfl:   •  montelukast (SINGULAIR) 10 MG tablet, TAKE 1 TABLET BY MOUTH EVERY DAY AT NIGHT, Disp: 90 tablet, Rfl: 1  •  Omeprazole 20 MG tablet delayed-release, Take 40  "mg by mouth Daily., Disp: , Rfl:   •  pregabalin (LYRICA) 75 MG capsule, Take 75 mg by mouth 3 (Three) Times a Day., Disp: , Rfl:   •  PreviDent 5000 Sensitive 1.1-5 % paste, , Disp: , Rfl:   •  valACYclovir (VALTREX) 500 MG tablet, TAKE 1 (ONE) TABLET TWICE A DAY, Disp: , Rfl: 0    Allergies   Allergen Reactions   • Diphenhydramine Other (See Comments)     Agitated, and restless   • Latex Rash   • Latex, Natural Rubber Rash   • Sulfa Antibiotics Rash   • Sulfamethoxazole-Trimethoprim Itching and Rash       Review of Systems   Constitutional: Positive for fatigue. Negative for fever.   Respiratory: Negative for shortness of breath.    Cardiovascular: Negative for chest pain, palpitations and leg swelling.   Musculoskeletal: Positive for arthralgias.   Skin: Negative for color change and wound.   Neurological: Negative for weakness.       Objective     /80   Pulse 100   Temp 98.6 °F (37 °C)   Ht 152.4 cm (60\")   Wt 88.9 kg (196 lb)   SpO2 98%   BMI 38.28 kg/m²   Wt Readings from Last 3 Encounters:   02/09/21 88.9 kg (196 lb)   01/05/21 82.6 kg (182 lb)   10/20/20 84.8 kg (187 lb)     Temp Readings from Last 3 Encounters:   02/09/21 98.6 °F (37 °C)   01/05/21 97.5 °F (36.4 °C) (Oral)   10/20/20 97.9 °F (36.6 °C) (Temporal)     BP Readings from Last 3 Encounters:   02/09/21 100/80   01/05/21 138/97   10/20/20 146/96     Pulse Readings from Last 3 Encounters:   02/09/21 100   01/05/21 86   10/20/20 75       Physical Exam  Vitals signs reviewed.   Constitutional:       Appearance: She is well-developed.   HENT:      Head: Normocephalic and atraumatic.   Cardiovascular:      Rate and Rhythm: Normal rate and regular rhythm.      Heart sounds: Normal heart sounds. No murmur.   Pulmonary:      Effort: Pulmonary effort is normal. No respiratory distress.      Breath sounds: Normal breath sounds.   Musculoskeletal: Normal range of motion.   Skin:     General: Skin is warm and dry.   Neurological:      Mental " Status: She is alert.   Psychiatric:         Behavior: Behavior normal.         Thought Content: Thought content normal.         Judgment: Judgment normal.         Assessment/Plan     Diagnoses and all orders for this visit:    1. Encounter to establish care (Primary)    2. POTS (postural orthostatic tachycardia syndrome)  Comments:  Stable, followed by autonomic clinic/cardiology at [facility]  Orders:  -     TSH Rfx On Abnormal To Free T4    3. Essential hypertension  Comments:  Well-controlled, continue current regimen, the healthy diet low in salt and exercise  Orders:  -     Comprehensive Metabolic Panel  -     CBC & Differential  -     Cancel: Urinalysis With Culture If Indicated - Urine, Clean Catch  -     Urinalysis With Microscopic If Indicated (No Culture) - Urine, Clean Catch; Future  -     Urinalysis With Microscopic If Indicated (No Culture) - Urine, Clean Catch    4. Familial hypercholesterolemia  Comments:  Focus on healthy diet and exercise  Orders:  -     Comprehensive Metabolic Panel  -     Lipid Panel    5. IgA deficiency, selective (CMS/HCC)  Comments:  stable    6. Weight gain  Comments:  focus on healthy diet and exercise  Orders:  -     TSH Rfx On Abnormal To Free T4    7. Screening for viral disease  -     Hepatitis C antibody    Encouraged healthy diet, regular exercise, maintenance of healthy weight, good sleep habits, avoidance of tobacco/vaping products and excessive alcohol.    I recommended she to avoid the pounds and yoga on YouTube to help with stress and weight loss.    Patient did present insurance/disability paperwork to fill out, I will fill out and fax to her insurance.  Will scanned into chart when completed.    She will follow-up in 6 months.

## 2021-02-10 ENCOUNTER — TELEPHONE (OUTPATIENT)
Dept: INTERNAL MEDICINE | Facility: CLINIC | Age: 36
End: 2021-02-10

## 2021-02-26 LAB
APPEARANCE UR: ABNORMAL
BILIRUB UR QL STRIP: NEGATIVE
COLOR UR: YELLOW
GLUCOSE UR QL: NEGATIVE
HGB UR QL STRIP: NEGATIVE
KETONES UR QL STRIP: NEGATIVE
LEUKOCYTE ESTERASE UR QL STRIP: NEGATIVE
NITRITE UR QL STRIP: NEGATIVE
PH UR STRIP: 6.5 [PH] (ref 5–8)
PROT UR QL STRIP: NEGATIVE
SP GR UR: 1.02 (ref 1–1.03)
UROBILINOGEN UR STRIP-MCNC: ABNORMAL MG/DL

## 2021-02-27 LAB
ALBUMIN SERPL-MCNC: 4.3 G/DL (ref 3.5–5.2)
ALBUMIN/GLOB SERPL: 1.7 G/DL
ALP SERPL-CCNC: 78 U/L (ref 39–117)
ALT SERPL-CCNC: 40 U/L (ref 1–33)
AST SERPL-CCNC: 39 U/L (ref 1–32)
BASOPHILS # BLD AUTO: 0.04 10*3/MM3 (ref 0–0.2)
BASOPHILS NFR BLD AUTO: 0.4 % (ref 0–1.5)
BILIRUB SERPL-MCNC: 0.4 MG/DL (ref 0–1.2)
BUN SERPL-MCNC: 8 MG/DL (ref 6–20)
BUN/CREAT SERPL: 12.3 (ref 7–25)
CALCIUM SERPL-MCNC: 9.5 MG/DL (ref 8.6–10.5)
CHLORIDE SERPL-SCNC: 102 MMOL/L (ref 98–107)
CHOLEST SERPL-MCNC: 250 MG/DL (ref 0–200)
CO2 SERPL-SCNC: 25.9 MMOL/L (ref 22–29)
CREAT SERPL-MCNC: 0.65 MG/DL (ref 0.57–1)
EOSINOPHIL # BLD AUTO: 0.14 10*3/MM3 (ref 0–0.4)
EOSINOPHIL NFR BLD AUTO: 1.5 % (ref 0.3–6.2)
ERYTHROCYTE [DISTWIDTH] IN BLOOD BY AUTOMATED COUNT: 12 % (ref 12.3–15.4)
GLOBULIN SER CALC-MCNC: 2.5 GM/DL
GLUCOSE SERPL-MCNC: 97 MG/DL (ref 65–99)
HCT VFR BLD AUTO: 41.2 % (ref 34–46.6)
HCV AB S/CO SERPL IA: <0.1 S/CO RATIO (ref 0–0.9)
HDLC SERPL-MCNC: 37 MG/DL (ref 40–60)
HGB BLD-MCNC: 14.1 G/DL (ref 12–15.9)
IMM GRANULOCYTES # BLD AUTO: 0.04 10*3/MM3 (ref 0–0.05)
IMM GRANULOCYTES NFR BLD AUTO: 0.4 % (ref 0–0.5)
LDLC SERPL CALC-MCNC: 153 MG/DL (ref 0–100)
LYMPHOCYTES # BLD AUTO: 2.64 10*3/MM3 (ref 0.7–3.1)
LYMPHOCYTES NFR BLD AUTO: 28.3 % (ref 19.6–45.3)
MCH RBC QN AUTO: 32.6 PG (ref 26.6–33)
MCHC RBC AUTO-ENTMCNC: 34.2 G/DL (ref 31.5–35.7)
MCV RBC AUTO: 95.4 FL (ref 79–97)
MONOCYTES # BLD AUTO: 0.61 10*3/MM3 (ref 0.1–0.9)
MONOCYTES NFR BLD AUTO: 6.5 % (ref 5–12)
NEUTROPHILS # BLD AUTO: 5.87 10*3/MM3 (ref 1.7–7)
NEUTROPHILS NFR BLD AUTO: 62.9 % (ref 42.7–76)
NRBC BLD AUTO-RTO: 0 /100 WBC (ref 0–0.2)
PLATELET # BLD AUTO: 329 10*3/MM3 (ref 140–450)
POTASSIUM SERPL-SCNC: 4.5 MMOL/L (ref 3.5–5.2)
PROT SERPL-MCNC: 6.8 G/DL (ref 6–8.5)
RBC # BLD AUTO: 4.32 10*6/MM3 (ref 3.77–5.28)
SODIUM SERPL-SCNC: 138 MMOL/L (ref 136–145)
TRIGL SERPL-MCNC: 323 MG/DL (ref 0–150)
TSH SERPL DL<=0.005 MIU/L-ACNC: 1.41 UIU/ML (ref 0.27–4.2)
VLDLC SERPL CALC-MCNC: 60 MG/DL (ref 5–40)
WBC # BLD AUTO: 9.34 10*3/MM3 (ref 3.4–10.8)

## 2021-03-01 ENCOUNTER — TELEPHONE (OUTPATIENT)
Dept: INTERNAL MEDICINE | Facility: CLINIC | Age: 36
End: 2021-03-01

## 2021-03-01 NOTE — TELEPHONE ENCOUNTER
Hub staff attempted to follow warm transfer process and was unsuccessful     Caller: Marycarmen Gauthier    Relationship to patient: Self    Best call back number: [phone]    Patient is needing: PATIENT SAID SHE RECEIVED A VOICEMAIL TODAY FROM [facility] REQUESTING SHE CALLBACK, OFFICE ADVISED [facility] HAD NOT CALLED TODAY, 03/01/21, ADVISED PATIENT WOULD LEAVE NOTE FOR CALLBACK

## 2021-03-16 ENCOUNTER — IMMUNIZATION (OUTPATIENT)
Dept: VACCINE CLINIC | Facility: HOSPITAL | Age: 36
End: 2021-03-16

## 2021-03-16 PROCEDURE — 0001A: CPT | Performed by: OBSTETRICS & GYNECOLOGY

## 2021-03-16 PROCEDURE — 91300 HC SARSCOV02 VAC 30MCG/0.3ML IM: CPT | Performed by: OBSTETRICS & GYNECOLOGY

## 2021-04-06 ENCOUNTER — IMMUNIZATION (OUTPATIENT)
Dept: VACCINE CLINIC | Facility: HOSPITAL | Age: 36
End: 2021-04-06

## 2021-04-06 PROCEDURE — 91300 HC SARSCOV02 VAC 30MCG/0.3ML IM: CPT | Performed by: OBSTETRICS & GYNECOLOGY

## 2021-04-06 PROCEDURE — 0002A: CPT | Performed by: OBSTETRICS & GYNECOLOGY

## 2021-05-04 ENCOUNTER — OFFICE VISIT (OUTPATIENT)
Dept: FAMILY MEDICINE CLINIC | Facility: CLINIC | Age: 36
End: 2021-05-04

## 2021-05-04 VITALS
OXYGEN SATURATION: 100 % | RESPIRATION RATE: 15 BRPM | TEMPERATURE: 98.4 F | SYSTOLIC BLOOD PRESSURE: 140 MMHG | DIASTOLIC BLOOD PRESSURE: 82 MMHG | WEIGHT: 196 LBS | BODY MASS INDEX: 38.28 KG/M2 | HEART RATE: 83 BPM

## 2021-05-04 DIAGNOSIS — I10 ESSENTIAL HYPERTENSION: Primary | ICD-10-CM

## 2021-05-04 DIAGNOSIS — G90.A POTS (POSTURAL ORTHOSTATIC TACHYCARDIA SYNDROME): ICD-10-CM

## 2021-05-04 PROBLEM — N80.9 ENDOMETRIOSIS: Status: ACTIVE | Noted: 2019-08-24

## 2021-05-04 PROBLEM — A69.20 LYME DISEASE: Status: RESOLVED | Noted: 2019-08-24 | Resolved: 2021-05-04

## 2021-05-04 PROBLEM — I47.20 PAROXYSMAL VENTRICULAR TACHYCARDIA: Status: ACTIVE | Noted: 2019-08-24

## 2021-05-04 PROBLEM — I47.29 PAROXYSMAL VENTRICULAR TACHYCARDIA: Status: ACTIVE | Noted: 2019-08-24

## 2021-05-04 PROBLEM — A69.20 LYME DISEASE: Status: ACTIVE | Noted: 2019-08-24

## 2021-05-04 PROCEDURE — 99213 OFFICE O/P EST LOW 20 MIN: CPT | Performed by: FAMILY MEDICINE

## 2021-05-04 RX ORDER — AMLODIPINE BESYLATE 2.5 MG/1
2.5 TABLET ORAL DAILY
COMMUNITY
End: 2021-05-04

## 2021-05-04 RX ORDER — BECLOMETHASONE DIPROPIONATE HFA 40 UG/1
1 AEROSOL, METERED RESPIRATORY (INHALATION) DAILY
COMMUNITY
Start: 2021-04-26 | End: 2021-05-04

## 2021-05-04 RX ORDER — SEMAGLUTIDE 1.34 MG/ML
1 INJECTION, SOLUTION SUBCUTANEOUS DAILY
COMMUNITY
End: 2021-05-04

## 2021-05-04 RX ORDER — GUANFACINE 1 MG/1
2 TABLET ORAL 2 TIMES DAILY
COMMUNITY
Start: 2021-01-01

## 2021-05-04 RX ORDER — MINOCYCLINE HYDROCHLORIDE 100 MG/1
1 CAPSULE ORAL DAILY
COMMUNITY
End: 2021-05-04

## 2021-05-04 RX ORDER — DULOXETIN HYDROCHLORIDE 60 MG/1
60 CAPSULE, DELAYED RELEASE ORAL DAILY
COMMUNITY
End: 2021-05-04

## 2021-05-04 RX ORDER — UBIDECARENONE 75 MG
300 CAPSULE ORAL DAILY
COMMUNITY

## 2021-05-04 NOTE — PROGRESS NOTES
Chief Complaint  Hypertension    Subjective    History of Present Illness {CC  Problem List  Visit  Diagnosis   Encounters  Notes  Medications  Labs  Result Review Imaging  Media :23}     Marycarmen Gauthier presents to [facility] for Hypertension.  History of Present Illness     Here today to establish care.  Formerly seen by Dr. Gnee Diaz.    Considered herself overall healthy prior to the birth of her son who is now 8.  Has since developed a number of issues including hypertension, postural orthostatic tachycardia syndrome, and some autonomic disequilibrium.  Sees a specialty clinic at [place] for these issues, follows up with them twice yearly.  No need for any refills at this time.    Most often has problems with blood pressure lability.  Blood pressure will occasionally go up at which point she takes as needed blood pressure medications.  Often times this ends up over correcting.      She tries to stay physically active though has found this difficult during the pandemic.  Son has been diagnosed with autism and requires a fair amount of hands-on help.  Does note that she feels better when she is able to be physically active daily.  Has a hard time finding the right balance has she can easily overdo it.    Currently decently well controlled with fluoxetine, daily acetaminophen, topical diclofenac, pregabalin, and guanfacine.    Objective     Vital Signs:   /82   Pulse 83   Temp 98.4 °F (36.9 °C)   Resp 15   Wt 88.9 kg (196 lb)   SpO2 100%   BMI 38.28 kg/m²   Physical Exam  Vitals and nursing note reviewed.   Constitutional:       General: She is not in acute distress.     Appearance: Normal appearance. She is not ill-appearing.   Cardiovascular:      Rate and Rhythm: Normal rate and regular rhythm.      Pulses: Normal pulses.      Heart sounds: Normal heart sounds. No murmur heard.     Pulmonary:      Effort: Pulmonary effort is normal. No respiratory  distress.      Breath sounds: Normal breath sounds. No rales.   Neurological:      Mental Status: She is alert and oriented to person, place, and time. Mental status is at baseline.   Psychiatric:         Mood and Affect: Mood normal.         Behavior: Behavior normal.          Result Review  Data Reviewed:{ Labs  Result Review  Imaging  Med Tab  Media :23}                   Assessment and Plan {CC Problem List  Visit Diagnosis  ROS  Review (Popup)  Health Maintenance  Quality  BestPractice  Medications  SmartSets  SnapShot Encounters  Media :23}   Diagnoses and all orders for this visit:    1. Essential hypertension (Primary)    2. POTS (postural orthostatic tachycardia syndrome)    Encouraged her to continue with current regimen as prescribed.  She will let me know as she needs refills.  Continue follow[facility]up with [facility] as scheduled.    Recommended follow-up with me as below.  Encouraged communication via edenest in the meantime.      Follow Up {Instructions Charge Capture  Follow-up Communications :23}     Patient was given instructions and counseling regarding her condition or for health maintenance advice. Please see specific information pulled into the AVS (placed there by myself) if appropriate.    Return in about 6 months (around 11/4/2021), or if symptoms worsen or fail to improve.      BRETT Thacker MD

## 2021-06-07 ENCOUNTER — OFFICE VISIT (OUTPATIENT)
Dept: SLEEP MEDICINE | Facility: HOSPITAL | Age: 36
End: 2021-06-07

## 2021-06-07 VITALS
WEIGHT: 197 LBS | OXYGEN SATURATION: 99 % | BODY MASS INDEX: 38.68 KG/M2 | HEART RATE: 77 BPM | SYSTOLIC BLOOD PRESSURE: 126 MMHG | HEIGHT: 60 IN | DIASTOLIC BLOOD PRESSURE: 85 MMHG

## 2021-06-07 DIAGNOSIS — E66.9 OBESITY (BMI 30-39.9): ICD-10-CM

## 2021-06-07 DIAGNOSIS — G25.81 RESTLESS LEGS SYNDROME (RLS): ICD-10-CM

## 2021-06-07 DIAGNOSIS — R06.83 SNORING: ICD-10-CM

## 2021-06-07 DIAGNOSIS — G47.10 HYPERSOMNIA: Primary | ICD-10-CM

## 2021-06-07 PROCEDURE — G0463 HOSPITAL OUTPT CLINIC VISIT: HCPCS

## 2021-06-07 RX ORDER — PRAMIPEXOLE DIHYDROCHLORIDE 0.25 MG/1
0.25 TABLET ORAL NIGHTLY
Qty: 30 TABLET | Refills: 2 | Status: SHIPPED | OUTPATIENT
Start: 2021-06-07 | End: 2021-08-27 | Stop reason: SDUPTHER

## 2021-06-07 NOTE — PROGRESS NOTES
"[facility]  Telephone: [phone] / Fax: [phone] [place]  Telephone: [phone] / Fax: [phone] Diana Jurado    Referring Physician: Frandy Lomas neuro  PCP: David Thacker MD    Reason for consult:  sleep apnea    Marycarmen Gauthier is a 36 y.o.female  was seen in the Sleep Disorders Center today for evaluation of sleep apnea. She was tested for JENAE in [place] in 2016-at that time her weight was 137 lbs. Study was negative. She then had her study repeated in 2020- HST-also negative. 1 week later, she had in lab study at [place] which was also negative. She returns today for evaluation of worsening snoring and abnormal breathing during sleep. Hr 8 year old son wakes her up from sleep as she is \"breathing funny\". She has been gasping for breath. During weekend trip in [place]-shared room with mom- loud snoring. I reviewed 2020 sleep studies. Her latest study showed REM AHI of 15 and RDI  15.  She reports RLS. PLM index was 22 and PLM arousal index was 8 in 2020.  She has constant urge to move her legs. This still present. She does not take any medications to alleviate RLS.    She still has her tonsils. She gained 20 lbs since Feb 2020  study.  In the past 5 years, she gained 60 lbs. Over the past year, she started feeling more tired. She has POTS syndrome. She continues to feel tired despite increasing sleep time.  She denies sleep paralysis, hallucinations when falling asleep or waking up from sleep, or features of cataplexy.    SH- RN on disability since 2018, 4 sodas per day. No alcohol    ROS- +persistent hoarseness, +myalgias, +irregular heart rate, +cough, +SOA, +dizziness, +anxiety, +depression, +GERD, +diarrhea, rest is negative.      Marycarmen Gauthier  has a past medical history of Allergic (1/1/1986), Anxiety, Asthma, Autonomic dysfunction (2017), Bulging lumbar disc (2015), Chronic pain, Chronic sinus infection, Depression, Dizziness, VICKERS (dyspnea on exertion), " Essential hypertension, Fatigue, Fibromyalgia, Fibromyalgia, primary (1/1/2015), Flexor hallucis longus tendinitis (06/2013), GERD (gastroesophageal reflux disease) (1/1/1998), H/O Bilateral carpal tunnel syndrome, H/O transfusion of packed red blood cells, History of abnormal cervical Pap smear (2011), History of anemia, History of infectious mononucleosis, History of medical problems, History of tachycardia, Hyperlipidemia, Hypermobile joint syndrome of multiple sites, Hypermobile joints, Irritable bowel syndrome (2011), Kidney stone, Leukocytosis, Lyme disease (8/24/2019), Numbness and tingling in both hands, Obesity, Osteoarthritis, Palpitations, POTS (postural orthostatic tachycardia syndrome) (09/19/2017), PROM (premature rupture of membranes), PTSD (post-traumatic stress disorder) (07/2014), Seasonal allergies, Snoring, SOB (shortness of breath), Syncope and collapse, and Visual impairment (2014).    Current Medications:    Current Outpatient Medications:   •  acetaminophen (TYLENOL) 650 MG 8 hr tablet, Take 650 mg by mouth As Needed for Mild Pain ., Disp: , Rfl:   •  ALBUTEROL SULFATE HFA IN, Inhale., Disp: , Rfl:   •  azelastine (ASTELIN) 0.1 % nasal spray, 2 sprays into the nostril(s) as directed by provider 2 (Two) Times a Day. Use in each nostril as directed, Disp: 30 mL, Rfl: 3  •  beclomethasone (QVAR) 40 MCG/ACT inhaler, Inhale 2 puffs 2 (Two) Times a Day., Disp: , Rfl:   •  cycloSPORINE (RESTASIS) 0.05 % ophthalmic emulsion, 1 drop 2 (Two) Times a Day., Disp: , Rfl:   •  diclofenac (VOLTAREN) 1 % gel gel, Apply 4 g topically to the appropriate area as directed 4 (Four) Times a Day As Needed., Disp: , Rfl:   •  Fexofenadine HCl (ALLEGRA ALLERGY PO), Take 180 mg by mouth As Needed., Disp: , Rfl:   •  FLUoxetine (PROzac) 40 MG capsule, Take 40 mg by mouth Daily., Disp: , Rfl:   •  guanFACINE (TENEX) 1 MG tablet, Take 1.5 mg by mouth 2 (two) times a day., Disp: , Rfl:   •  hydrocortisone 2.5 % cream,  "Apply  topically to the appropriate area as directed 2 (Two) Times a Day., Disp: , Rfl:   •  L-THEANINE PO, Take 200 mg by mouth 2 (Two) Times a Day., Disp: , Rfl:   •  mometasone (NASONEX) 50 MCG/ACT nasal spray, 2 sprays into the nostril(s) as directed by provider 2 (two) times a day., Disp: , Rfl:   •  montelukast (SINGULAIR) 10 MG tablet, TAKE 1 TABLET BY MOUTH EVERY DAY AT NIGHT, Disp: 90 tablet, Rfl: 1  •  Omeprazole 20 MG tablet delayed-release, Take 40 mg by mouth Daily., Disp: , Rfl:   •  pregabalin (LYRICA) 75 MG capsule, Take 75 mg by mouth 3 (Three) Times a Day., Disp: , Rfl:   •  PreviDent 5000 Sensitive 1.1-5 % paste, , Disp: , Rfl:   •  valACYclovir (VALTREX) 500 MG tablet, TAKE 1 (ONE) TABLET TWICE A DAY, Disp: , Rfl: 0  •  vitamin B-12 (cyanocobalamin) 100 MCG tablet, Take 300 mcg by mouth Daily., Disp: , Rfl:     I have reviewed Past Medical History, Past Surgical History, Medication List, Social History and Family History as entered in Sleep Questionnaire and EPIC.    ESS  11   Vital Signs /85   Pulse 77   Ht 152.4 cm (60\")   Wt 89.4 kg (197 lb)   SpO2 99%   BMI 38.47 kg/m²  Body mass index is 38.47 kg/m².    General Alert and oriented. No acute distress noted   Pharynx/Throat Class IV Mallampati airway, large tongue, no evidence of redundant lateral pharyngeal tissue. No oral lesions. No thrush. Moist mucous membranes.   Head Normocephalic. Symmetrical. Atraumatic.    Nose No septal deviation. No drainage   Chest Wall Normal shape. Symmetric expansion with respiration. No tenderness.   Neck Trachea midline, no thyromegaly or adenopathy    Lungs Clear to auscultation bilaterally. No wheezes. No rhonchi. No rales. Respirations regular, even and unlabored.   Heart Regular rhythm and normal rate. Normal S1 and S2. No murmur   Abdomen Soft, non-tender and non-distended. Normal bowel sounds. No masses.   Extremities Moves all extremities well. No edema   Psychiatric Normal mood and affect. "     Labs-   Hemoglobin   12.0 - 15.9 g/dL 14.1    Hematocrit   34.0 - 46.6 % 41.2         Study- in lab- [facility] -Feb 2020- PLM index 22, PLM arousal index 8.4, REM AHI 15, overall AHI 2.9.    HST- [facility]- Feb 2020- REM 3.1, lowest oxygen desaturation 88%     Impression:  1. Hypersomnia    2. Snoring    3. Obesity (BMI 30-39.9)    4. Restless legs syndrome (RLS)          Plan:  I discussed the pathophysiology of obstructive sleep apnea with the patient.  We discussed the adverse outcomes associated with untreated sleep-disordered breathing.  We discussed treatment modalities of obstructive sleep apnea including CPAP device. Sleep study will be scheduled to establish a definitive diagnosis of sleep disorder breathing.  Weight loss will be strongly beneficial in order to reduce the severity of sleep-disordered breathing.  Patient has narrow oropharyngeal structure.  Caution during activities that require prolonged concentration is strongly advised.  After sleep study results are available, patient will be notified, and appointment will be scheduled to discuss sleep study results and treatment recommendations.    Start Pramipexole 0.25mg 2 hours prior to bedtime.  Reduce caffeine after lunch. I reviewed prior sleep records.    I appreciate the opportunity to participate in this patient's care.      MAGGI Ruffin  [facility]  Phone: [phone]      Part of this note may be an electronic transcription/translation of spoken language to printed text using the Dragon Dictation System. Some errors may exist even though the document was edited.

## 2021-06-22 ENCOUNTER — APPOINTMENT (OUTPATIENT)
Dept: SLEEP MEDICINE | Facility: HOSPITAL | Age: 36
End: 2021-06-22

## 2021-07-07 ENCOUNTER — OFFICE VISIT (OUTPATIENT)
Dept: FAMILY MEDICINE CLINIC | Facility: CLINIC | Age: 36
End: 2021-07-07

## 2021-07-07 VITALS
DIASTOLIC BLOOD PRESSURE: 92 MMHG | RESPIRATION RATE: 16 BRPM | WEIGHT: 196.6 LBS | HEART RATE: 71 BPM | BODY MASS INDEX: 38.6 KG/M2 | SYSTOLIC BLOOD PRESSURE: 134 MMHG | HEIGHT: 60 IN | OXYGEN SATURATION: 99 %

## 2021-07-07 DIAGNOSIS — Z00.00 ROUTINE HEALTH MAINTENANCE: Primary | ICD-10-CM

## 2021-07-07 DIAGNOSIS — E66.09 CLASS 2 OBESITY DUE TO EXCESS CALORIES WITHOUT SERIOUS COMORBIDITY WITH BODY MASS INDEX (BMI) OF 38.0 TO 38.9 IN ADULT: ICD-10-CM

## 2021-07-07 DIAGNOSIS — G90.A POTS (POSTURAL ORTHOSTATIC TACHYCARDIA SYNDROME): ICD-10-CM

## 2021-07-07 PROBLEM — E66.812 CLASS 2 OBESITY DUE TO EXCESS CALORIES WITHOUT SERIOUS COMORBIDITY WITH BODY MASS INDEX (BMI) OF 38.0 TO 38.9 IN ADULT: Status: ACTIVE | Noted: 2021-07-07

## 2021-07-07 PROCEDURE — 1159F MED LIST DOCD IN RCRD: CPT | Performed by: FAMILY MEDICINE

## 2021-07-07 PROCEDURE — 1170F FXNL STATUS ASSESSED: CPT | Performed by: FAMILY MEDICINE

## 2021-07-07 PROCEDURE — G0402 INITIAL PREVENTIVE EXAM: HCPCS | Performed by: FAMILY MEDICINE

## 2021-07-07 NOTE — PROGRESS NOTES
Medicare Subsequent Wellness Visit  Subjective   History of Present Illness    Marycarmen Gauthier is a 36 y.o. female who presents for an Medicare Wellness Visit. In addition, we addressed the following health issues:      PMH, PSH, SocHx, FamHx, Allergies, and Medications: Reviewed and updated in the history section of chart.  Family History   Problem Relation Age of Onset   • Hypertension Mother         Brain aneurysm   • Other Mother         Lipids   • Obesity Mother    • Cancer Father 61        Non Hodgkin’s Lymphoma  (dx 2009, currently on chemo)   • Obesity Father    • Cancer Sister 27        Hodgkin’s lymphoma (dx in her 30’s) in remission   • Depression Sister    • Obesity Sister    • Anxiety disorder Sister    • Colon cancer Maternal Grandmother    • Hypertension Maternal Grandmother    • Stroke Maternal Grandmother    • Heart disease Maternal Grandmother    • Depression Maternal Grandmother    • Hyperlipidemia Maternal Grandmother    • Heart disease Paternal Grandfather    • Hyperlipidemia Paternal Grandfather    • Arthritis Paternal Grandfather    • Sudden death Maternal Grandfather    • Aneurysm Maternal Grandfather    • Anemia Paternal Grandmother    • Depression Paternal Grandmother    • Anxiety disorder Paternal Grandmother    • Arthritis Paternal Grandmother    • Mental illness Paternal Grandmother    • Clotting disorder Neg Hx    • Diabetes Neg Hx        Social History     Social History Narrative    Nurse, currently disabled       Allergies   Allergen Reactions   • Diphenhydramine Other (See Comments)     Agitated, and restless   • Latex Rash   • Latex, Natural Rubber Rash   • Sulfa Antibiotics Rash   • Sulfamethoxazole-Trimethoprim Itching and Rash       Outpatient Medications Prior to Visit   Medication Sig Dispense Refill   • acetaminophen (TYLENOL) 650 MG 8 hr tablet Take 650 mg by mouth As Needed for Mild Pain .     • ALBUTEROL SULFATE HFA IN Inhale.     • azelastine (ASTELIN) 0.1 % nasal spray  2 sprays into the nostril(s) as directed by provider 2 (Two) Times a Day. Use in each nostril as directed 30 mL 3   • beclomethasone (QVAR) 40 MCG/ACT inhaler Inhale 2 puffs 2 (Two) Times a Day.     • cycloSPORINE (RESTASIS) 0.05 % ophthalmic emulsion 1 drop 2 (Two) Times a Day.     • diclofenac (VOLTAREN) 1 % gel gel Apply 4 g topically to the appropriate area as directed 4 (Four) Times a Day As Needed.     • Fexofenadine HCl (ALLEGRA ALLERGY PO) Take 180 mg by mouth As Needed.     • FLUoxetine (PROzac) 40 MG capsule Take 40 mg by mouth Daily.     • guanFACINE (TENEX) 1 MG tablet Take 1.5 mg by mouth 2 (two) times a day.     • hydrocortisone 2.5 % cream Apply  topically to the appropriate area as directed 2 (Two) Times a Day.     • L-THEANINE PO Take 200 mg by mouth 2 (Two) Times a Day.     • mometasone (NASONEX) 50 MCG/ACT nasal spray 2 sprays into the nostril(s) as directed by provider 2 (two) times a day.     • montelukast (SINGULAIR) 10 MG tablet TAKE 1 TABLET BY MOUTH EVERY DAY AT NIGHT 90 tablet 1   • Omeprazole 20 MG tablet delayed-release Take 40 mg by mouth Daily.     • pramipexole (Mirapex) 0.25 MG tablet Take 1 tablet by mouth Every Night. 30 tablet 2   • pregabalin (LYRICA) 75 MG capsule Take 75 mg by mouth 3 (Three) Times a Day.     • PreviDent 5000 Sensitive 1.1-5 % paste      • valACYclovir (VALTREX) 500 MG tablet TAKE 1 (ONE) TABLET TWICE A DAY  0   • vitamin B-12 (cyanocobalamin) 100 MCG tablet Take 300 mcg by mouth Daily.       No facility-administered medications prior to visit.        Patient Active Problem List   Diagnosis   • POTS (postural orthostatic tachycardia syndrome)   • Essential hypertension   • Hyperlipidemia   • Bilateral numbness and tingling of arms and legs   • Anxiety   • Carpal tunnel syndrome on both sides   • Chronic frontal sinusitis   • Chronic seasonal allergic rhinitis due to pollen   • VICKERS (dyspnea on exertion)   • Falls   • Chronic fatigue   • Hypermobility syndrome   •  IgA deficiency, selective (CMS/HCC)   • Low back pain at multiple sites   • Myalgia   • Nausea   • Nephrolithiasis   • Nystagmus, optokinetic   • Osteoarthritis   • PTSD (post-traumatic stress disorder)   • RAD (reactive airway disease)   • Small fiber neuropathy   • Tachycardia   • Cervical spondylosis   • DDD (degenerative disc disease), cervical   • Leukocytosis   • Acid reflux   • Disequilibrium   • Disorder of autonomic nervous system   • Endometriosis   • Paroxysmal ventricular tachycardia (CMS/HCC)         Patient Care Team:  David Thacker MD as PCP - General (Family Medicine)  Adam Carbajal MD as Consulting Physician (Hematology and Oncology)  Health Habits:  Current Diet: Well balanced diet  Tobacco use.  Pack years:  Dental Exam.   Eye Exam.   Exercise:  Current exercise activities include:    Recent Hospitalizations:  none    Age-appropriate Screening Schedule:  Refer to the list below for future screening recommendations based on patient's age. Orders for these recommended tests are listed in the plan section. The patient has been provided with a written plan.      Health Maintenance   Topic Date Due   • TDAP/TD VACCINES (1 - Tdap) Never done   • Pneumococcal Vaccine 0-64 (2 of 3 - PCV13) 03/08/2018   • ANNUAL WELLNESS VISIT  Never done   • INFLUENZA VACCINE  08/01/2021   • LIPID PANEL  02/26/2022   • PAP SMEAR  11/17/2023   • HEPATITIS C SCREENING  Completed   • COVID-19 Vaccine  Completed       Depression Screen:   PHQ-2/PHQ-9 Depression Screening 7/7/2021   Little interest or pleasure in doing things 3   Feeling down, depressed, or hopeless 2   Trouble falling or staying asleep, or sleeping too much 3   Feeling tired or having little energy 3   Poor appetite or overeating 3   Feeling bad about yourself - or that you are a failure or have let yourself or your family down 3   Trouble concentrating on things, such as reading the newspaper or watching television 3   Moving or speaking so slowly  that other people could have noticed. Or the opposite - being so fidgety or restless that you have been moving around a lot more than usual 2   Thoughts that you would be better off dead, or of hurting yourself in some way 0   Total Score 22   If you checked off any problems, how difficult have these problems made it for you to do your work, take care of things at home, or get along with other people? Extremely dIfficult       Functional and Cognitive Screening:  Functional & Cognitive Status 7/7/2021   Do you have difficulty preparing food and eating? No   Do you have difficulty bathing yourself, getting dressed or grooming yourself? Yes   Do you have difficulty using the toilet? No   Do you have difficulty moving around from place to place? Yes   Do you have trouble with steps or getting out of a bed or a chair? Yes   Current Diet Unhealthy Diet   Dental Exam Up to date   Eye Exam Up to date   Exercise (times per week) 2 times per week   Current Exercises Include Walking   Do you need help using the phone?  No   Are you deaf or do you have serious difficulty hearing?  No   Do you need help with transportation? Yes   Do you need help shopping? Yes   Do you need help preparing meals?  No   Do you need help with housework?  Yes   Do you need help with laundry? No   Do you need help taking your medications? No   Do you need help managing money? No   Do you ever drive or ride in a car without wearing a seat belt? No   Have you felt unusual stress, anger or loneliness in the last month? Yes   Who do you live with? Spouse   If you need help, do you have trouble finding someone available to you? No   Have you been bothered in the last four weeks by sexual problems? No   Do you have difficulty concentrating, remembering or making decisions? Yes     Does the patient have evidence of cognitive impairment?     Compared to one year ago, the patient feels their physical health and mental health are the same.       Review of  "Systems    Objective     Vitals:    07/07/21 0846   BP: 134/92   Pulse: 71   Resp: 16   SpO2: 99%   Weight: 89.2 kg (196 lb 9.6 oz)   Height: 152.4 cm (60\")       Body mass index is 38.4 kg/m².    PHYSICAL EXAM  Vitals reviewed and on chart.  HEENT: PERRLA, EOMI. Oral mucosa moist,   No LAD.  CV: RRR, no murmurs, rubs, clicks or gallops  LUNGS: CTA bilaterally  EXT: No edema, FROM in bilateral upper and lower ext  NEURO: CN II - XII grossly intact  PSYCH: good mood, positive affect, alert and engaged. No thoughts of self harm  expressed.    ASSESSMENT AND PLAN  Pap smear :  Mammogram:  Colon cancer screening :  Lung cancer screening :  Bone Density :      Problem List Items Addressed This Visit     None          Orders:  No orders of the defined types were placed in this encounter.      Follow Up:  No follow-ups on file.     ADVANCED DIRECTIVES:   {Advance Directive Status:90838}    An After Visit Summary and PPPS with all of these plans were given to the patient.             "

## 2021-07-07 NOTE — PROGRESS NOTES
The ABCs of the Annual Wellness Visit  Welcome to Medicare Visit    Chief Complaint   Patient presents with   • Annual Exam       Subjective   History of Present Illness:  Marycarmen Gauthier is a 36 y.o. female who presents for a  Welcome to Medicare Visit.    Has had Medicare now for just about a year.  Is due for preventive maintenance visit.  Doing well overall, fairly well caught up on preventive health recommendations.  Has had multiple EKGs in the recent past secondary to diagnosis of POTS.  No need for any labs or immunizations at this time.    Weight is up somewhat.  Recently moved into a new house with a pool, looking forward to becoming more active in the near future.  He is trying to watch her diet overall.  Hoping to get in better shape over the next year or so.    HEALTH RISK ASSESSMENT    Recent Hospitalizations:  No hospitalization(s) within the last year.    Current Medical Providers:  Patient Care Team:  David Thacker MD as PCP - General (Family Medicine)  Adam Carbajal MD as Consulting Physician (Hematology and Oncology)    Smoking Status:  Social History     Tobacco Use   Smoking Status Never Smoker   Smokeless Tobacco Never Used   Tobacco Comment    CAFFEINE USE       Alcohol Consumption:  Social History     Substance and Sexual Activity   Alcohol Use Not Currently   • Alcohol/week: 0.0 standard drinks    Comment: 1 drink every few months socially       Depression Screen:   PHQ-2/PHQ-9 Depression Screening 7/7/2021   Little interest or pleasure in doing things 3   Feeling down, depressed, or hopeless 2   Trouble falling or staying asleep, or sleeping too much 3   Feeling tired or having little energy 3   Poor appetite or overeating 3   Feeling bad about yourself - or that you are a failure or have let yourself or your family down 3   Trouble concentrating on things, such as reading the newspaper or watching television 3   Moving or speaking so slowly that other people could have noticed.  Or the opposite - being so fidgety or restless that you have been moving around a lot more than usual 2   Thoughts that you would be better off dead, or of hurting yourself in some way 0   Total Score 22   If you checked off any problems, how difficult have these problems made it for you to do your work, take care of things at home, or get along with other people? Extremely dIfficult       Fall Risk Screen:  [facility] Fall Risk Assessment has not been completed.    Health Habits and Functional and Cognitive Screening:  Functional & Cognitive Status 7/7/2021   Do you have difficulty preparing food and eating? No   Do you have difficulty bathing yourself, getting dressed or grooming yourself? Yes   Do you have difficulty using the toilet? No   Do you have difficulty moving around from place to place? Yes   Do you have trouble with steps or getting out of a bed or a chair? Yes   Current Diet Unhealthy Diet   Dental Exam Up to date   Eye Exam Up to date   Exercise (times per week) 2 times per week   Current Exercises Include Walking   Do you need help using the phone?  No   Are you deaf or do you have serious difficulty hearing?  No   Do you need help with transportation? Yes   Do you need help shopping? Yes   Do you need help preparing meals?  No   Do you need help with housework?  Yes   Do you need help with laundry? No   Do you need help taking your medications? No   Do you need help managing money? No   Do you ever drive or ride in a car without wearing a seat belt? No   Have you felt unusual stress, anger or loneliness in the last month? Yes   Who do you live with? Spouse   If you need help, do you have trouble finding someone available to you? No   Have you been bothered in the last four weeks by sexual problems? No   Do you have difficulty concentrating, remembering or making decisions? Yes         Does the patient have evidence of cognitive impairment? No    Asprin use counseling:Does not need ASA (and currently is  not on it)    Visual Acuity:    No exam data present    Age-appropriate Screening Schedule:  Refer to the list below for future screening recommendations based on patient's age, sex and/or medical conditions. Orders for these recommended tests are listed in the plan section. The patient has been provided with a written plan.    Health Maintenance   Topic Date Due   • TDAP/TD VACCINES (1 - Tdap) Never done   • INFLUENZA VACCINE  08/01/2021   • LIPID PANEL  02/26/2022   • PAP SMEAR  11/17/2023          The following portions of the patient's history were reviewed and updated as appropriate: allergies, current medications, past family history, past medical history, past social history, past surgical history and problem list.    Outpatient Medications Prior to Visit   Medication Sig Dispense Refill   • acetaminophen (TYLENOL) 650 MG 8 hr tablet Take 650 mg by mouth As Needed for Mild Pain .     • ALBUTEROL SULFATE HFA IN Inhale.     • azelastine (ASTELIN) 0.1 % nasal spray 2 sprays into the nostril(s) as directed by provider 2 (Two) Times a Day. Use in each nostril as directed 30 mL 3   • beclomethasone (QVAR) 40 MCG/ACT inhaler Inhale 2 puffs 2 (Two) Times a Day.     • cycloSPORINE (RESTASIS) 0.05 % ophthalmic emulsion 1 drop 2 (Two) Times a Day.     • diclofenac (VOLTAREN) 1 % gel gel Apply 4 g topically to the appropriate area as directed 4 (Four) Times a Day As Needed.     • Fexofenadine HCl (ALLEGRA ALLERGY PO) Take 180 mg by mouth As Needed.     • FLUoxetine (PROzac) 40 MG capsule Take 40 mg by mouth Daily.     • guanFACINE (TENEX) 1 MG tablet Take 1.5 mg by mouth 2 (two) times a day.     • hydrocortisone 2.5 % cream Apply  topically to the appropriate area as directed 2 (Two) Times a Day.     • L-THEANINE PO Take 200 mg by mouth 2 (Two) Times a Day.     • mometasone (NASONEX) 50 MCG/ACT nasal spray 2 sprays into the nostril(s) as directed by provider 2 (two) times a day.     • montelukast (SINGULAIR) 10 MG tablet  TAKE 1 TABLET BY MOUTH EVERY DAY AT NIGHT 90 tablet 1   • Omeprazole 20 MG tablet delayed-release Take 40 mg by mouth Daily.     • pramipexole (Mirapex) 0.25 MG tablet Take 1 tablet by mouth Every Night. 30 tablet 2   • pregabalin (LYRICA) 75 MG capsule Take 75 mg by mouth 3 (Three) Times a Day.     • PreviDent 5000 Sensitive 1.1-5 % paste      • valACYclovir (VALTREX) 500 MG tablet TAKE 1 (ONE) TABLET TWICE A DAY  0   • vitamin B-12 (cyanocobalamin) 100 MCG tablet Take 300 mcg by mouth Daily.       No facility-administered medications prior to visit.       Patient Active Problem List   Diagnosis   • POTS (postural orthostatic tachycardia syndrome)   • Essential hypertension   • Hyperlipidemia   • Bilateral numbness and tingling of arms and legs   • Anxiety   • Carpal tunnel syndrome on both sides   • Chronic frontal sinusitis   • Chronic seasonal allergic rhinitis due to pollen   • VICKERS (dyspnea on exertion)   • Falls   • Chronic fatigue   • Hypermobility syndrome   • IgA deficiency, selective (CMS/HCC)   • Low back pain at multiple sites   • Myalgia   • Nausea   • Nephrolithiasis   • Nystagmus, optokinetic   • Osteoarthritis   • PTSD (post-traumatic stress disorder)   • RAD (reactive airway disease)   • Small fiber neuropathy   • Tachycardia   • Cervical spondylosis   • DDD (degenerative disc disease), cervical   • Leukocytosis   • Acid reflux   • Disequilibrium   • Disorder of autonomic nervous system   • Endometriosis   • Paroxysmal ventricular tachycardia (CMS/HCC)   • Class 2 obesity due to excess calories without serious comorbidity with body mass index (BMI) of 38.0 to 38.9 in adult       Advanced Care Planning:  ACP discussion was held with the patient during this visit. Patient has an advance directive in EMR which is still valid.     Review of Systems   Constitutional: Negative for activity change, chills, fatigue, fever and unexpected weight change.   HENT: Negative for sore throat.    Respiratory:  "Negative for cough, shortness of breath and wheezing.    Cardiovascular: Negative for chest pain, palpitations and leg swelling.   Gastrointestinal: Negative for abdominal distention, abdominal pain, constipation, diarrhea, nausea and vomiting.   Genitourinary: Negative for dysuria.   Musculoskeletal: Negative for arthralgias and myalgias.   Skin: Negative for rash.   Neurological: Negative for dizziness.   Psychiatric/Behavioral: Negative for dysphoric mood. The patient is not nervous/anxious.        Compared to one year ago, the patient feels her physical health is the same.  Compared to one year ago, the patient feels her mental health is the same.    Reviewed chart for potential of high risk medication in the elderly: not applicable  Reviewed chart for potential of harmful drug interactions in the elderly:not applicable    Objective         Vitals:    07/07/21 0846   BP: 134/92   Pulse: 71   Resp: 16   SpO2: 99%   Weight: 89.2 kg (196 lb 9.6 oz)   Height: 152.4 cm (60\")       Body mass index is 38.4 kg/m².  Discussed the patient's BMI with her. The BMI is above average; BMI management plan is completed.    Physical Exam  Vitals and nursing note reviewed.   Constitutional:       General: She is not in acute distress.     Appearance: Normal appearance. She is not ill-appearing.   Cardiovascular:      Rate and Rhythm: Normal rate and regular rhythm.      Pulses: Normal pulses.      Heart sounds: Normal heart sounds. No murmur heard.     Pulmonary:      Effort: Pulmonary effort is normal. No respiratory distress.      Breath sounds: Normal breath sounds. No rales.   Neurological:      Mental Status: She is alert and oriented to person, place, and time. Mental status is at baseline.   Psychiatric:         Mood and Affect: Mood normal.         Behavior: Behavior normal.               Procedures    Assessment/Plan   Medicare Risks and Personalized Health Plan  CMS Preventative Services Quick " Reference  Depression/Dysphoria  Obesity/Overweight     The above risks/problems have been discussed with the patient.  Pertinent information has been shared with the patient in the After Visit Summary.  Follow up plans and orders are seen below in the Assessment/Plan Section.    Diagnoses and all orders for this visit:    1. Routine health maintenance (Primary)    2. POTS (postural orthostatic tachycardia syndrome)    3. Class 2 obesity due to excess calories without serious comorbidity with body mass index (BMI) of 38.0 to 38.9 in adult    Follow-up with specialists as scheduled.  No need for any labs at this time.  Encouraged to continue working on diet and exercise.    Recommended follow-up as below.  Encouraged communication via [employer] in the meantime.    Follow Up:  Return in about 6 months (around 1/7/2022), or if symptoms worsen or fail to improve.     An After Visit Summary and PPPS were given to the patient.     Prevention counseling performed as below: Mindfulness for stress management.

## 2021-07-12 ENCOUNTER — HOSPITAL ENCOUNTER (OUTPATIENT)
Dept: SLEEP MEDICINE | Facility: HOSPITAL | Age: 36
Discharge: HOME | End: 2021-07-12
Admitting: NURSE PRACTITIONER

## 2021-07-12 DIAGNOSIS — E66.9 OBESITY (BMI 30-39.9): ICD-10-CM

## 2021-07-12 DIAGNOSIS — R06.83 SNORING: ICD-10-CM

## 2021-07-12 DIAGNOSIS — G47.10 HYPERSOMNIA: ICD-10-CM

## 2021-07-12 PROCEDURE — 95806 SLEEP STUDY UNATT&RESP EFFT: CPT

## 2021-07-26 ENCOUNTER — TELEPHONE (OUTPATIENT)
Dept: SLEEP MEDICINE | Facility: HOSPITAL | Age: 36
End: 2021-07-26

## 2021-08-16 ENCOUNTER — OFFICE VISIT (OUTPATIENT)
Dept: SLEEP MEDICINE | Facility: HOSPITAL | Age: 36
End: 2021-08-16

## 2021-08-16 VITALS
OXYGEN SATURATION: 99 % | HEIGHT: 60 IN | DIASTOLIC BLOOD PRESSURE: 90 MMHG | WEIGHT: 196 LBS | SYSTOLIC BLOOD PRESSURE: 128 MMHG | BODY MASS INDEX: 38.48 KG/M2 | HEART RATE: 84 BPM

## 2021-08-16 DIAGNOSIS — G25.81 RESTLESS LEGS SYNDROME (RLS): Primary | ICD-10-CM

## 2021-08-16 DIAGNOSIS — E66.9 OBESITY (BMI 30-39.9): ICD-10-CM

## 2021-08-16 PROCEDURE — G0463 HOSPITAL OUTPT CLINIC VISIT: HCPCS

## 2021-08-16 NOTE — PROGRESS NOTES
[facility]  Telephone: [phone] / Fax: [phone] Mobile  Telephone: [phone] / Fax: [phone] Diana Jurado    PCP: David Thacker MD    Reason for visit: RLS  f/u    Marycarmen Gauthier is a 36 y.o.female  was last seen at [facility] sleep lab in June. She had HST through [facility] in July. It showed no JENAE. Last visit I started Pramipexole 0.25mg. She feels that it helped tremendously. She is able to sleep better and no longer kicks/moves her legs. Her ESS is 10. She goes to bed at 10pm and is up at 8am.    SH- no tobacco, no alcohol, 2 caffeine per day, no energy drinks.    ROS- +SOA, +anxiety, +depression, rest is negative.    Current Medications:    Current Outpatient Medications:   •  acetaminophen (TYLENOL) 650 MG 8 hr tablet, Take 650 mg by mouth As Needed for Mild Pain ., Disp: , Rfl:   •  ALBUTEROL SULFATE HFA IN, Inhale., Disp: , Rfl:   •  azelastine (ASTELIN) 0.1 % nasal spray, 2 sprays into the nostril(s) as directed by provider 2 (Two) Times a Day. Use in each nostril as directed, Disp: 30 mL, Rfl: 3  •  beclomethasone (QVAR) 40 MCG/ACT inhaler, Inhale 2 puffs 2 (Two) Times a Day., Disp: , Rfl:   •  cycloSPORINE (RESTASIS) 0.05 % ophthalmic emulsion, 1 drop 2 (Two) Times a Day., Disp: , Rfl:   •  diclofenac (VOLTAREN) 1 % gel gel, Apply 4 g topically to the appropriate area as directed 4 (Four) Times a Day As Needed., Disp: , Rfl:   •  Fexofenadine HCl (ALLEGRA ALLERGY PO), Take 180 mg by mouth As Needed., Disp: , Rfl:   •  FLUoxetine (PROzac) 40 MG capsule, Take 40 mg by mouth Daily., Disp: , Rfl:   •  guanFACINE (TENEX) 1 MG tablet, Take 1.5 mg by mouth 2 (two) times a day., Disp: , Rfl:   •  hydrocortisone 2.5 % cream, Apply  topically to the appropriate area as directed 2 (Two) Times a Day., Disp: , Rfl:   •  L-THEANINE PO, Take 200 mg by mouth 2 (Two) Times a Day., Disp: , Rfl:   •  mometasone (NASONEX) 50 MCG/ACT nasal spray, 2 sprays into the nostril(s) as  directed by provider 2 (two) times a day., Disp: , Rfl:   •  montelukast (SINGULAIR) 10 MG tablet, TAKE 1 TABLET BY MOUTH EVERY DAY AT NIGHT, Disp: 90 tablet, Rfl: 1  •  Omeprazole 20 MG tablet delayed-release, Take 40 mg by mouth Daily., Disp: , Rfl:   •  pramipexole (Mirapex) 0.25 MG tablet, Take 1 tablet by mouth Every Night., Disp: 30 tablet, Rfl: 2  •  pregabalin (LYRICA) 75 MG capsule, Take 75 mg by mouth 3 (Three) Times a Day., Disp: , Rfl:   •  PreviDent 5000 Sensitive 1.1-5 % paste, , Disp: , Rfl:   •  valACYclovir (VALTREX) 500 MG tablet, TAKE 1 (ONE) TABLET TWICE A DAY, Disp: , Rfl: 0  •  vitamin B-12 (cyanocobalamin) 100 MCG tablet, Take 300 mcg by mouth Daily., Disp: , Rfl:    also entered in Sleep Questionnaire    Patient  has a past medical history of Allergic (1/1/1986), Anxiety, Asthma, Autonomic dysfunction (2017), Bulging lumbar disc (2015), Chronic pain, Chronic sinus infection, Coronary artery disease, Depression, Dizziness, VICKERS (dyspnea on exertion), Essential hypertension, Fatigue, Fibromyalgia, Fibromyalgia, primary (1/1/2015), Flexor hallucis longus tendinitis (06/2013), GERD (gastroesophageal reflux disease) (1/1/1998), H/O Bilateral carpal tunnel syndrome, H/O transfusion of packed red blood cells, History of abnormal cervical Pap smear (2011), History of anemia, History of infectious mononucleosis, History of medical problems, History of tachycardia, Hyperlipidemia, Hypermobile joint syndrome of multiple sites, Hypermobile joints, Irritable bowel syndrome (2011), Kidney stone, Leukocytosis, Lyme disease (8/24/2019), Numbness and tingling in both hands, Obesity, Osteoarthritis, Palpitations, POTS (postural orthostatic tachycardia syndrome) (09/19/2017), PROM (premature rupture of membranes), PTSD (post-traumatic stress disorder) (07/2014), Seasonal allergies, Snoring, SOB (shortness of breath), Syncope and collapse, and Visual impairment (2014).    I have reviewed the Past Medical History,  "Past Surgical History, Social History and Family History.    Vital Signs /90   Pulse 84   Ht 152.4 cm (60\")   Wt 88.9 kg (196 lb)   SpO2 99%   BMI 38.28 kg/m²  Body mass index is 38.28 kg/m².    General Alert and oriented. No acute distress noted   Pharynx/Throat Class IV   Mallampati airway, large tongue, no evidence of redundant lateral pharyngeal tissue. No oral lesions. No thrush. Moist mucous membranes.   Head Normocephalic. Symmetrical. Atraumatic.    Nose No septal deviation. No drainage   Chest Wall Normal shape. Symmetric expansion with respiration. No tenderness.   Neck Trachea midline, no thyromegaly or adenopathy    Lungs Clear to auscultation bilaterally. No wheezes. No rhonchi. No rales. Respirations regular, even and unlabored.   Heart Regular rhythm and normal rate. Normal S1 and S2. No murmur   Abdomen Soft, non-tender and non-distended. Normal bowel sounds. No masses.   Extremities Moves all extremities well. No edema   Psychiatric Normal mood and affect.     Testing:    Study- in lab- [facility] -Feb 2020- PLM index 22, PLM arousal index 8.4, REM AHI 15, overall AHI 2.9.     HST- [facility]- Feb 2020- REM 3.1, lowest oxygen desaturation 88%    Study-July 2021- Diagnostic findings: The patient tolerated the home sleep testing with monitoring time of 540 minutes. The data obtained make this a technically adequate study. The apnea hypopneas index(AHI) was 2.4 per sleep hour.  The AHI during supine position was 1.9 per sleep hour. Mean heart rate of 65.7 BPM.  Snoring was noted 33.1% of sleep time. Lowest oxygen saturation during the study was 89%. Saturation below 89% was noted for 0 mins.  In the right lateral position, mild sleep apnea with AHI of 12.4 is noted.  Patient had only 29 minutes of sleep in this position.    Impression:  1. Restless legs syndrome (RLS)    2. Obesity (BMI 30-39.9)          Plan:  She is doing great on Pramipexole 0.25mg. I will issue additional refills. I asked her to " decrease caffeine. She reports occasional constipation after starting Pramipexole. I recommended she cuts the dose of Pramipexole in half and increases fiber intake in her diet. Weight loss is going to be beneficial.    Follow up with Dr. Quinonez in 6 months    Thank you for allowing me to participate in your patient's care.      MAGGI Ruffin  [facility]  Phone: [phone]      Part of this note may be an electronic transcription/translation of spoken language to printed text using the Dragon Dictation System.

## 2021-08-20 ENCOUNTER — TELEPHONE (OUTPATIENT)
Dept: FAMILY MEDICINE CLINIC | Facility: CLINIC | Age: 36
End: 2021-08-20

## 2021-08-20 NOTE — TELEPHONE ENCOUNTER
Caller: Marycarmen Gauthier    Relationship: Self    Best call back number: [phone]    What form or medical record are you requesting: LONG TERM DISABILITY FORM    How would you like to receive the form or medical records (pick-up, mail, fax): CALL FOR     Additional notes: PATIENT IS DROPPING OFF TODAY. SHE WANTED THE OFFICE TO BE AWARE.

## 2021-08-27 RX ORDER — PRAMIPEXOLE DIHYDROCHLORIDE 0.25 MG/1
0.25 TABLET ORAL NIGHTLY
Qty: 30 TABLET | Refills: 2 | Status: SHIPPED | OUTPATIENT
Start: 2021-08-27 | End: 2021-09-03 | Stop reason: SDUPTHER

## 2021-09-03 RX ORDER — PRAMIPEXOLE DIHYDROCHLORIDE 0.25 MG/1
0.25 TABLET ORAL NIGHTLY
Qty: 30 TABLET | Refills: 2 | Status: SHIPPED | OUTPATIENT
Start: 2021-09-03 | End: 2021-11-01 | Stop reason: SDUPTHER

## 2021-09-07 ENCOUNTER — OFFICE VISIT (OUTPATIENT)
Dept: CARDIOLOGY | Facility: CLINIC | Age: 36
End: 2021-09-07

## 2021-09-07 VITALS
OXYGEN SATURATION: 99 % | BODY MASS INDEX: 38.68 KG/M2 | DIASTOLIC BLOOD PRESSURE: 78 MMHG | WEIGHT: 197 LBS | HEART RATE: 73 BPM | HEIGHT: 60 IN | RESPIRATION RATE: 16 BRPM | SYSTOLIC BLOOD PRESSURE: 126 MMHG

## 2021-09-07 DIAGNOSIS — G90.A POTS (POSTURAL ORTHOSTATIC TACHYCARDIA SYNDROME): Primary | ICD-10-CM

## 2021-09-07 DIAGNOSIS — F41.9 ANXIETY: ICD-10-CM

## 2021-09-07 DIAGNOSIS — I10 ESSENTIAL HYPERTENSION: ICD-10-CM

## 2021-09-07 DIAGNOSIS — J30.1 CHRONIC SEASONAL ALLERGIC RHINITIS DUE TO POLLEN: ICD-10-CM

## 2021-09-07 DIAGNOSIS — R00.0 TACHYCARDIA: ICD-10-CM

## 2021-09-07 DIAGNOSIS — R06.09 DOE (DYSPNEA ON EXERTION): ICD-10-CM

## 2021-09-07 PROCEDURE — 93000 ELECTROCARDIOGRAM COMPLETE: CPT | Performed by: NURSE PRACTITIONER

## 2021-09-07 PROCEDURE — 99213 OFFICE O/P EST LOW 20 MIN: CPT | Performed by: NURSE PRACTITIONER

## 2021-09-07 NOTE — PROGRESS NOTES
Date of Office Visit: 2021  Encounter Provider: MAGGI Garcia  Place of Service: [facility] CARDIOLOG[facility]  Patient Name: Marycarmen Gauthier  :1985      Patient ID:  Marycarmen Gauthier is a 36 y.o. female is here for followup for POTS.  She is a new patient to me and I reviewed her records in preparation for this visit.        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    She had a tilt table study done 17 which showed inappropriate sinus tachycardia but no evidence of vasovagal syncope.  Tachycardia occurred after nitroglycerin.     She was seen at the [facility] 2018.  She went there for POTS.  In , she had premature rupture of membranes and had her son prematurely at 29 weeks.  He was in the  intensive care unit for 3 months.  After that, she felt like her nerves are shot.  She began having nausea and sweating.  She also started noticing episodes of anxiety, short windedness and tachycardia.  She was having difficulty eating caring for her son because when these episodes of dizziness and tachycardia, on, she has to lie down.  She's checked her blood pressure before when she's had the episodes and she could not even get a blood pressure on her blood pressure monitor.  He does have a history of fibromyalgia, hypermobile joints and posttraumatic stress disorder.  Her autonomic testing done at [facility] showed a normal QSART so no peripheral autonomic neuropathy.  Her tilt table study showed postural tachycardia but stable blood pressure.  They thought she had postural orthostatic tachycardia syndrome and recommended exercise and consideration of beta blockers.  In addition they recommended 3-5 g of sodium a day, to 2.5 L of fluid per day and sleeping with her head raised 6-10 inches as well as compression stockings avoiding alcohol and large  meals.      She had an echocardiogram done December 2017 which was normal.  She has seen Dr. Lomas from neurology for left arm numbness in the past.     She does have joint hypermobility and sicca syndrome and sees Dr. Crook.  She has fibromyalgia.  She also has endometriosis.     She is  and has one child who was born premature.  He is hyperactive, and since our last appointment with her he was diagnosed with autism.  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  She doesn't smoke.  She rarely has alcohol.  She has one cup of Coke per day.  There is no family history of premature cardiovascular disease.     She's been diagnosed with lyme disease.  She had a hysterectomy with Dr. Mason 9/2018.  She had a cholecystectomy 6/2019 and had a postop infection.     She last saw Dr. Hardin in 2019.  She was finally recovering from her Lyme's disease and subsequent postop infection.  She was trying to increase her exercise and felt like she was starting to get better.  She is going to see Dr. Lyman at [facility] for her POTS management. She was last seen by the cardiology APRN at [facility] 12/4/2020 in a telehealth visit.  Her son had recently been diagnosed with autism.  The home environment was not peaceful due to this and her stress levels were very high.  Her blood pressure has been a little higher.  She was having days where she had lay down because of headaches associated with elevated blood pressure.  Usually is the bottom number and it goes up into the 90s. She was having some issues with tachycardia, palpitations cough and shortness of breath. She was not having any syncope, but did have some orthostatic dizziness and nausea.  She was on guanfacine 0.5 mg twice a day this was increased to 1 mg twice a day.  Propanolol was stopped due to her use of inhalers and allergies.    She is here today to follow-up.  She has been having increased palpitations, shortness of breath,  dizziness, fatigue, and has been having some chest pain.  She states that she was diagnosed with Lisbeth-Danlos syndrome since her last visit with us.  They just moved to a new home closer to her parents.  School just started back and she has a 9-year-old son with autism and anxiety.  She has been very stressed at home and sleep has been a big issue.  With all of this, and her increased stress she has noticed an increase in palpitations, shortness of breath, and nausea.  She has had some weight gain and is noticed that her blood pressure has been higher, especially her diastolic.  She tries to stay hydrated, but does not drink as much as she should on some days.  Last week she noticed that she had some pain in the left side of her chest that felt like a pulled muscle.  She had just gotten out of the shower and was putting on lotion.  It persisted throughout the whole day, and did not get any better. It started extending into her back and arm.  She noticed that her blood pressure was elevated that day and she kept feeling nauseated.  She finally threw up that evening.  She took a pain pill which eased the pain some but did not relieve it completely.  She continued to have a terrible headache.  She rested throughout the next day and took pain medications which helped with her pain in her chest, but it is still there.  Nothing really makes it go away.  She tries to avoid stimulants, but does have to drink a Coke for some caffeine to make it through the day since her sleep pattern has been so disrupted.  She tries to exercise on a seated elliptical.  She has some dizziness when she is laying flat, she describes it as the room spinning around her.  She has some dizziness when she is standing up that just feels like a severe need to lay down.  She is not had much energy.  Her blood pressures at home have been running 135/95 for about the past 6 months she has noticed they have been increasing.  She was 126/78 in the office  today.  She is not on propanolol anymore because it caused worsening shortness of breath.  She reports a tightness in her hands and in her feet and calves at high that feels like swelling.  I do not see any evidence of edema on her evaluation today.      Past Medical History:   Diagnosis Date   • Allergic 1/1/1986   • Anxiety    • Asthma    • Autonomic dysfunction 2017   • Bulging lumbar disc 2015   • Chronic pain    • Chronic sinus infection    • Coronary artery disease     chris-danlos syndrome   • Depression    • Dizziness    • VICKERS (dyspnea on exertion)    • Essential hypertension    • Fatigue    • Fibromyalgia    • Fibromyalgia, primary 1/1/2015   • Flexor hallucis longus tendinitis 06/2013   • GERD (gastroesophageal reflux disease) 1/1/1998   • H/O Bilateral carpal tunnel syndrome    • H/O transfusion of packed red blood cells    • History of abnormal cervical Pap smear 2011   • History of anemia    • History of infectious mononucleosis    • History of medical problems     Lichen Sclerosus, Hypermobility, herpes virus   • History of tachycardia     POTS   • Hyperlipidemia    • Hypermobile joint syndrome of multiple sites    • Hypermobile joints    • Irritable bowel syndrome 2011   • Kidney stone     History of 2   • Leukocytosis    • Lyme disease 8/24/2019    Formatting of this note might be different from the original. Chronic   • Numbness and tingling in both hands    • Obesity    • Osteoarthritis    • Palpitations    • POTS (postural orthostatic tachycardia syndrome) 09/19/2017   • PROM (premature rupture of membranes)    • PTSD (post-traumatic stress disorder) 07/2014   • Seasonal allergies    • Snoring    • SOB (shortness of breath)    • Syncope and collapse    • Visual impairment 2014    SICCA         Past Surgical History:   Procedure Laterality Date   • ANKLE SURGERY Right 2014    FHL tendon release   • APPENDECTOMY  2019    incisions took long time to close   • BREAST SURGERY  10/15/2013    Reduction    • CARPAL TUNNEL RELEASE Right 2017    REVISE MEDIAN N/CARPAL TUNNEL SURG   •  SECTION     • CHOLECYSTECTOMY  2019    post op infection   • COLONOSCOPY  2017   • HYSTERECTOMY  2018   • JOINT REPLACEMENT      Rotator cuff and fhl tendon release (right sides)   • LAPAROSCOPIC CHOLECYSTECTOMY W/ CHOLANGIOGRAPHY     • ROTATOR CUFF REPAIR Right    • SINUS SURGERY     • WISDOM TOOTH EXTRACTION         Current Outpatient Medications on File Prior to Visit   Medication Sig Dispense Refill   • acetaminophen (TYLENOL) 650 MG 8 hr tablet Take 650 mg by mouth As Needed for Mild Pain .     • ALBUTEROL SULFATE HFA IN Inhale.     • azelastine (ASTELIN) 0.1 % nasal spray 2 sprays into the nostril(s) as directed by provider 2 (Two) Times a Day. Use in each nostril as directed 30 mL 3   • beclomethasone (QVAR) 40 MCG/ACT inhaler Inhale 2 puffs 2 (Two) Times a Day.     • cycloSPORINE (RESTASIS) 0.05 % ophthalmic emulsion 1 drop 2 (Two) Times a Day.     • diclofenac (VOLTAREN) 1 % gel gel Apply 4 g topically to the appropriate area as directed 4 (Four) Times a Day As Needed.     • Fexofenadine HCl (ALLEGRA ALLERGY PO) Take 180 mg by mouth As Needed.     • FLUoxetine (PROzac) 40 MG capsule Take 40 mg by mouth Daily.     • guanFACINE (TENEX) 1 MG tablet Take 1.5 mg by mouth 2 (two) times a day.     • hydrocortisone 2.5 % cream Apply  topically to the appropriate area as directed 2 (Two) Times a Day.     • L-THEANINE PO Take 200 mg by mouth 2 (Two) Times a Day.     • mometasone (NASONEX) 50 MCG/ACT nasal spray 2 sprays into the nostril(s) as directed by provider 2 (two) times a day.     • montelukast (SINGULAIR) 10 MG tablet TAKE 1 TABLET BY MOUTH EVERY DAY AT NIGHT 90 tablet 1   • Omeprazole 20 MG tablet delayed-release Take 40 mg by mouth Daily.     • pramipexole (Mirapex) 0.25 MG tablet Take 1 tablet by mouth Every Night. 30 tablet 2   • pregabalin (LYRICA) 75 MG capsule Take 75 mg by mouth 3 (Three)  Times a Day.     • PreviDent 5000 Sensitive 1.1-5 % paste      • valACYclovir (VALTREX) 500 MG tablet TAKE 1 (ONE) TABLET TWICE A DAY  0   • vitamin B-12 (cyanocobalamin) 100 MCG tablet Take 300 mcg by mouth Daily.       No current facility-administered medications on file prior to visit.       Social History     Socioeconomic History   • Marital status:      Spouse name: Roe   • Number of children: 1   • Years of education: College   • Highest education level: Not on file   Tobacco Use   • Smoking status: Never Smoker   • Smokeless tobacco: Never Used   • Tobacco comment: CAFFEINE USE   Vaping Use   • Vaping Use: Never used   Substance and Sexual Activity   • Alcohol use: Not Currently     Alcohol/week: 0.0 standard drinks     Comment: 1 drink every few months socially   • Drug use: No   • Sexual activity: Yes     Partners: Male     Birth control/protection: Surgical, None     Comment: , one son           Review of Systems   Constitutional: Positive for malaise/fatigue and weight gain. Negative for chills, decreased appetite, diaphoresis and fever.   HENT: Negative for nosebleeds.    Eyes: Negative for blurred vision.   Cardiovascular: Positive for chest pain and dyspnea on exertion. Negative for claudication, cyanosis, irregular heartbeat, leg swelling, near-syncope, orthopnea, palpitations, paroxysmal nocturnal dyspnea and syncope.   Respiratory: Negative for cough, hemoptysis, shortness of breath, sleep disturbances due to breathing, snoring and wheezing.    Hematologic/Lymphatic: Negative for bleeding problem. Does not bruise/bleed easily.   Musculoskeletal: Positive for falls, joint pain and myalgias. Negative for muscle cramps.   Gastrointestinal: Negative for heartburn.   Neurological: Positive for dizziness. Negative for excessive daytime sleepiness, headaches, light-headedness, numbness and weakness.   Psychiatric/Behavioral: Positive for depression. The patient is nervous/anxious.   "      Procedures    ECG 12 Lead    Date/Time: 9/7/2021 1:08 PM  Performed by: Tanya Guido APRN  Authorized by: Tanya Guido APRN   Comparison: compared with previous ECG from 8/20/2021  Similar to previous ECG  Rhythm: sinus rhythm  Rate: normal  BPM: 73  QRS axis: normal    Clinical impression: normal ECG                Objective:      Vitals:    09/07/21 0823   BP: 126/78   BP Location: Right arm   Patient Position: Lying   Cuff Size: Large Adult   Pulse: 73   Resp: 16   SpO2: 99%   Weight: 89.4 kg (197 lb)   Height: 152.4 cm (60\")     Body mass index is 38.47 kg/m².    Vitals reviewed.   Constitutional:       Appearance: Healthy appearance. Not in distress.   Eyes:      Conjunctiva/sclera: Conjunctivae normal.   Neck:      Vascular: No carotid bruit. JVD normal.   Pulmonary:      Effort: Pulmonary effort is normal.      Breath sounds: Normal breath sounds.   Cardiovascular:      PMI at left midclavicular line. Normal rate. Regular rhythm.      Murmurs: There is no murmur.   Pulses:     Intact distal pulses.   Edema:     Peripheral edema absent.   Abdominal:      Palpations: Abdomen is soft.      Tenderness: There is no abdominal tenderness.   Skin:     General: Skin is warm and dry.   Neurological:      Mental Status: Alert and oriented to person, place and time.   Psychiatric:         Attention and Perception: Attention and perception normal.         Lab Review:       Assessment:      Diagnosis Plan   1. POTS (postural orthostatic tachycardia syndrome)  ECG 12 Lead   2. Essential hypertension  ECG 12 Lead   3. Tachycardia  ECG 12 Lead   4. Chronic seasonal allergic rhinitis due to pollen     5. VICKERS (dyspnea on exertion)     6. Anxiety            Plan:       She has a lot of external stressors right now.  Has been a lot of change in her environment with buying a new home, starting back to school, and having an autistic child with anxiety.  Their sleep patterns have been very disrupted, she has gained " weight, and knows that she has not been able to manage her POTS as well as she has been able to in the past.  I have given her the [facility] article related to POTS and lifestyle modifications for treatment and management of it.  I have encouraged her to drink more fluid, avoid stimulants, and try to exercise more regularly.  She is going to follow-up with Dr. Hardin in 3 months to see how she is doing.  She will call us sooner if she has problems needs to be seen before then.

## 2021-09-22 ENCOUNTER — APPOINTMENT (OUTPATIENT)
Dept: VACCINE CLINIC | Facility: HOSPITAL | Age: 36
End: 2021-09-22

## 2021-09-27 ENCOUNTER — TELEPHONE (OUTPATIENT)
Dept: CARDIOLOGY | Facility: CLINIC | Age: 36
End: 2021-09-27

## 2021-09-27 NOTE — TELEPHONE ENCOUNTER
Patient is calling stating she has had a headache for the last 4-5 days. Suspected her BP might be elevated so she took it and it was 143/101. States that she increased her POTS medication thinking it would help, however it has not. Patient mentioned she is under quite a lot of stress due to having an autistic son, issues with school system, and marital problems. She is seen as well at [facility] but thought she would start here first since she was just seen on 9/7/2021      [phone]

## 2021-09-29 ENCOUNTER — APPOINTMENT (OUTPATIENT)
Dept: VACCINE CLINIC | Facility: HOSPITAL | Age: 36
End: 2021-09-29

## 2021-09-29 NOTE — TELEPHONE ENCOUNTER
Called and spoke with patient.  She is having a lot of headaches due to a lot of new stress with her son.  These are unrelieved by Tylenol.  She is running 130s-140s/90s-100s.  Very worried about the bottom number, she is on guanfacine for her POTS.  Her POTS specialist in [place] also told her in the past that he felt like pain was possibly increasing her blood pressure.  I hesitate to start medications to lower her blood pressure without treating the pain first.  She has not discussed her headaches with the primary care provider.  I will discuss with Dr. Hardin and get back with her.

## 2021-10-05 RX ORDER — AMLODIPINE BESYLATE 2.5 MG/1
1.25 TABLET ORAL DAILY
Qty: 30 TABLET | Refills: 11 | Status: SHIPPED | OUTPATIENT
Start: 2021-10-05 | End: 2023-02-14

## 2021-10-08 ENCOUNTER — OFFICE VISIT (OUTPATIENT)
Dept: FAMILY MEDICINE CLINIC | Facility: CLINIC | Age: 36
End: 2021-10-08

## 2021-10-08 VITALS
BODY MASS INDEX: 39.11 KG/M2 | HEART RATE: 93 BPM | OXYGEN SATURATION: 99 % | RESPIRATION RATE: 16 BRPM | SYSTOLIC BLOOD PRESSURE: 136 MMHG | WEIGHT: 199.2 LBS | DIASTOLIC BLOOD PRESSURE: 84 MMHG | HEIGHT: 60 IN

## 2021-10-08 DIAGNOSIS — G43.119 INTRACTABLE MIGRAINE WITH AURA WITHOUT STATUS MIGRAINOSUS: Primary | ICD-10-CM

## 2021-10-08 PROBLEM — M25.50 ARTHRALGIA: Status: ACTIVE | Noted: 2021-10-08

## 2021-10-08 PROBLEM — R06.83 SNORING: Status: ACTIVE | Noted: 2021-10-05

## 2021-10-08 PROBLEM — M48.00 SPINAL STENOSIS: Status: ACTIVE | Noted: 2021-10-08

## 2021-10-08 PROBLEM — Q79.60 EHLERS-DANLOS SYNDROME: Status: ACTIVE | Noted: 2021-04-01

## 2021-10-08 PROBLEM — K64.9 HEMORRHOID: Status: ACTIVE | Noted: 2021-10-08

## 2021-10-08 PROCEDURE — 99213 OFFICE O/P EST LOW 20 MIN: CPT | Performed by: NURSE PRACTITIONER

## 2021-10-08 RX ORDER — PREGABALIN 75 MG/1
CAPSULE ORAL
COMMUNITY
Start: 2021-09-21 | End: 2022-09-19

## 2021-10-08 RX ORDER — RIZATRIPTAN BENZOATE 10 MG/1
10 TABLET ORAL ONCE AS NEEDED
Qty: 30 TABLET | Refills: 1 | Status: SHIPPED | OUTPATIENT
Start: 2021-10-08

## 2021-10-08 RX ORDER — TRIAMCINOLONE ACETONIDE 5 MG/G
OINTMENT TOPICAL
COMMUNITY
Start: 2021-10-05 | End: 2023-02-23

## 2021-10-08 RX ORDER — BECLOMETHASONE DIPROPIONATE HFA 40 UG/1
AEROSOL, METERED RESPIRATORY (INHALATION)
COMMUNITY
Start: 2021-07-09 | End: 2021-11-01

## 2021-10-08 RX ORDER — PRAMIPEXOLE DIHYDROCHLORIDE 0.25 MG/1
TABLET ORAL
COMMUNITY
Start: 2021-09-19 | End: 2021-10-08

## 2021-10-08 NOTE — PROGRESS NOTES
Subjective  Answers for HPI/ROS submitted by the patient on 10/8/2021  Please describe your symptoms.: headache , Saw local cardio for chest pain in September, she was worried about POTS but put me on low dose of amlodipine started on 10/7 , suspected lower bp number due to pain , went to er on monday due to headache, nausea, vomitting, headache is returning , last bp was 110/97  Have you had these symptoms before?: Yes  How long have you been having these symptoms?: 1-2 weeks  Please list any medications you are currently taking for this condition.: Amlodipine Besylate 2.5 mg, take ½ tab daily ,  Guanfacine tablet- 2 mg 2x a day,  Pramipexole 0.25 mg nightly,  Triamcinolone 0.5% ointment to vulva nightly,  QVAR RediHaler 40 mcg HFA Aerosol breath activated 2 puffs 2x a day,  Fluoxetine Hcl 40 mg capsule every am,  Valacyclovir Hcl 500mg tablet daily ,  Lyrica 75 mg capsule each night,  Montelukast sod 10 MG tablet nightly,  Azelastine 0.1 % (137 mcg) spray 2 sprays in am and 1 sprays in pm in each nostril,  Mometasone Furoate Nasal Spray, 50 mcg (2 sprays in am in each nostril),  Allegra 180 mg daily,  Omeprazole 20 mg daily ,  Vitamin B12 Adenosylcobalamin 3000ug with Folic Acid daily,  NOW Supplements, L-Theanine 200 mg with Inositol, Stress Management, take 2 each night ,  Restasis 1 drop each eye twice a day,  Levabuterol Tar HFA 45 mcg inh (2 puffs every 4 hrs for cough PRN),  PreviDent ® 5000 Sensitive (1.1% Sodium Fluoride, 5% Potassium Nitrate)  Please describe any probable cause for these symptoms. : stress , we moved next door to my parents in july , son is autistic and has multiple outbursts of anger/hitting/yelling daily  What is the primary reason for your visit?: Kenya Gauthier is a 36 y.o. female.     History of Present Illness   Patient presents for follow-up for ER visit for migraine headache. She reports that her headache was so bad that she had nausea and severe headaches. Patient  "also reports photophobia. She states that she took a pain pill and went back to sleep. Upon waking she reports that the headache was back and hurt so bad \"she wanted to die\". She is followed by neurology, who she has called, but has not scheduled an appointment. Patient's CT was normal at ER. She reports that she's had two episodes of migraine since August.  Patient's blood pressure was elevated at the hospital and she was started on amlodipine 2.5mg 1 p.o.QD. She reports that her blood pressure at home as been 114/94.      The following portions of the patient's history were reviewed and updated as appropriate: allergies, current medications, past family history, past medical history, past social history, past surgical history and problem list.    Review of Systems   Constitutional: Negative for chills, fatigue and fever.   HENT: Positive for tinnitus.    Eyes: Positive for photophobia and visual disturbance. Negative for blurred vision and double vision.   Respiratory: Negative for cough, chest tightness and shortness of breath.    Cardiovascular: Negative for chest pain, palpitations and leg swelling.   Endocrine: Negative for heat intolerance.   Genitourinary: Negative for flank pain, frequency and urgency.   Neurological: Positive for dizziness and headache. Negative for tremors, syncope, weakness, memory problem and confusion.   Psychiatric/Behavioral: Negative for decreased concentration, hallucinations, suicidal ideas and depressed mood. The patient is not nervous/anxious.        Objective   Physical Exam  Vitals and nursing note reviewed.   Constitutional:       Appearance: She is well-developed.   HENT:      Head: Normocephalic and atraumatic.      Right Ear: External ear normal.      Left Ear: External ear normal.      Nose: Nose normal.   Eyes:      Conjunctiva/sclera: Conjunctivae normal.      Pupils: Pupils are equal, round, and reactive to light.   Neck:      Thyroid: No thyromegaly.   Cardiovascular: "      Rate and Rhythm: Normal rate and regular rhythm.      Heart sounds: Normal heart sounds. No murmur heard.     Pulmonary:      Effort: Pulmonary effort is normal.      Breath sounds: Normal breath sounds.   Musculoskeletal:      Cervical back: Normal range of motion and neck supple.   Lymphadenopathy:      Cervical: No cervical adenopathy.   Neurological:      General: No focal deficit present.      Mental Status: She is alert and oriented to person, place, and time.      Cranial Nerves: No cranial nerve deficit.      Sensory: No sensory deficit.      Motor: No tremor, atrophy or abnormal muscle tone.      Coordination: Coordination normal.      Gait: Gait normal.      Deep Tendon Reflexes: Reflexes normal.   Psychiatric:         Speech: Speech normal.         Behavior: Behavior normal.         Thought Content: Thought content normal.         Judgment: Judgment normal.         Vitals:    10/08/21 1409   BP: 136/84   Pulse: 93   Resp: 16   SpO2: 99%     Body mass index is 38.9 kg/m².    Procedures    Assessment/Plan   Problems Addressed this Visit     None      Visit Diagnoses     Intractable migraine with aura without status migrainosus    -  Primary    Relevant Medications    pregabalin (LYRICA) 75 MG capsule    rizatriptan (Maxalt) 10 MG tablet      Diagnoses       Codes Comments    Intractable migraine with aura without status migrainosus    -  Primary ICD-10-CM: G43.119  ICD-9-CM: 346.01         Will start Maxalt 10 mg 1 p.o. PRN as needed for migraine.  I reviewed possible adverse side affects of the medications.   Advised patient to keep migraine diary  Follow-up with neurology.  If your symptoms worsen or become severe, go to an ER.       Return if symptoms worsen or fail to improve.

## 2021-10-08 NOTE — PATIENT INSTRUCTIONS
Keep migraine diary  Follow-up with neurology  Return if symptoms worsen or fail to improve.   If your symptoms worsen or become severe, go to an ER.   Migraine Headache  A migraine headache is an intense, throbbing pain on one side or both sides of the head. Migraine headaches may also cause other symptoms, such as nausea, vomiting, and sensitivity to light and noise. A migraine headache can last from 4 hours to 3 days. Talk with your doctor about what things may bring on (trigger) your migraine headaches.  What are the causes?  The exact cause of this condition is not known. However, a migraine may be caused when nerves in the brain become irritated and release chemicals that cause inflammation of blood vessels. This inflammation causes pain. This condition may be triggered or caused by:  · Drinking alcohol.  · Smoking.  · Taking medicines, such as:  ? Medicine used to treat chest pain (nitroglycerin).  ? Birth control pills.  ? Estrogen.  ? Certain blood pressure medicines.  · Eating or drinking products that contain nitrates, glutamate, aspartame, or tyramine. Aged cheeses, chocolate, or caffeine may also be triggers.  · Doing physical activity.  Other things that may trigger a migraine headache include:  · Menstruation.  · Pregnancy.  · Hunger.  · Stress.  · Lack of sleep or too much sleep.  · Weather changes.  · Fatigue.  What increases the risk?  The following factors may make you more likely to experience migraine headaches:  · Being a certain age. This condition is more common in people who are 25-55 years old.  · Being female.  · Having a family history of migraine headaches.  · Being .  · Having a mental health condition, such as depression or anxiety.  · Being obese.  What are the signs or symptoms?  The main symptom of this condition is pulsating or throbbing pain. This pain may:  · Happen in any area of the head, such as on one side or both sides.  · Interfere with daily activities.  · Get  worse with physical activity.  · Get worse with exposure to bright lights or loud noises.  Other symptoms may include:  · Nausea.  · Vomiting.  · Dizziness.  · General sensitivity to bright lights, loud noises, or smells.  Before you get a migraine headache, you may get warning signs (an aura). An aura may include:  · Seeing flashing lights or having blind spots.  · Seeing bright spots, halos, or zigzag lines.  · Having tunnel vision or blurred vision.  · Having numbness or a tingling feeling.  · Having trouble talking.  · Having muscle weakness.  Some people have symptoms after a migraine headache (postdromal phase), such as:  · Feeling tired.  · Difficulty concentrating.  How is this diagnosed?  A migraine headache can be diagnosed based on:  · Your symptoms.  · A physical exam.  · Tests, such as:  ? CT scan or an MRI of the head. These imaging tests can help rule out other causes of headaches.  ? Taking fluid from the spine (lumbar puncture) and analyzing it (cerebrospinal fluid analysis, or CSF analysis).  How is this treated?  This condition may be treated with medicines that:  · Relieve pain.  · Relieve nausea.  · Prevent migraine headaches.  Treatment for this condition may also include:  · Acupuncture.  · Lifestyle changes like avoiding foods that trigger migraine headaches.  · Biofeedback.  · Cognitive behavioral therapy.  Follow these instructions at home:  Medicines  · Take over-the-counter and prescription medicines only as told by your health care provider.  · Ask your health care provider if the medicine prescribed to you:  ? Requires you to avoid driving or using heavy machinery.  ? Can cause constipation. You may need to take these actions to prevent or treat constipation:  § Drink enough fluid to keep your urine pale yellow.  § Take over-the-counter or prescription medicines.  § Eat foods that are high in fiber, such as beans, whole grains, and fresh fruits and vegetables.  § Limit foods that are  high in fat and processed sugars, such as fried or sweet foods.  Lifestyle  · Do not drink alcohol.  · Do not use any products that contain nicotine or tobacco, such as cigarettes, e-cigarettes, and chewing tobacco. If you need help quitting, ask your health care provider.  · Get at least 8 hours of sleep every night.  · Find ways to manage stress, such as meditation, deep breathing, or yoga.  General instructions         · Keep a journal to find out what may trigger your migraine headaches. For example, write down:  ? What you eat and drink.  ? How much sleep you get.  ? Any change to your diet or medicines.  · If you have a migraine headache:  ? Avoid things that make your symptoms worse, such as bright lights.  ? It may help to lie down in a dark, quiet room.  ? Do not drive or use heavy machinery.  ? Ask your health care provider what activities are safe for you while you are experiencing symptoms.  · Keep all follow-up visits as told by your health care provider. This is important.  Contact a health care provider if:  · You develop symptoms that are different or more severe than your usual migraine headache symptoms.  · You have more than 15 headache days in one month.  Get help right away if:  · Your migraine headache becomes severe.  · Your migraine headache lasts longer than 72 hours.  · You have a fever.  · You have a stiff neck.  · You have vision loss.  · Your muscles feel weak or like you cannot control them.  · You start to lose your balance often.  · You have trouble walking.  · You faint.  · You have a seizure.  Summary  · A migraine headache is an intense, throbbing pain on one side or both sides of the head. Migraines may also cause other symptoms, such as nausea, vomiting, and sensitivity to light and noise.  · This condition may be treated with medicines and lifestyle changes. You may also need to avoid certain things that trigger a migraine headache.  · Keep a journal to find out what may trigger  your migraine headaches.  · Contact your health care provider if you have more than 15 headache days in a month or you develop symptoms that are different or more severe than your usual migraine headache symptoms.  This information is not intended to replace advice given to you by your health care provider. Make sure you discuss any questions you have with your health care provider.  Document Revised: 04/10/2020 Document Reviewed: 01/30/2020  Elsevier Patient Education © 2021 [employer]

## 2021-10-15 ENCOUNTER — IMMUNIZATION (OUTPATIENT)
Dept: VACCINE CLINIC | Facility: HOSPITAL | Age: 36
End: 2021-10-15

## 2021-10-15 PROCEDURE — 0004A ADM SARSCOV2 30MCG/0.3ML BOOSTER: CPT | Performed by: INTERNAL MEDICINE

## 2021-10-15 PROCEDURE — 0003A: CPT | Performed by: INTERNAL MEDICINE

## 2021-10-15 PROCEDURE — 91300 HC SARSCOV02 VAC 30MCG/0.3ML IM: CPT | Performed by: INTERNAL MEDICINE

## 2021-11-01 ENCOUNTER — OFFICE VISIT (OUTPATIENT)
Dept: FAMILY MEDICINE CLINIC | Facility: CLINIC | Age: 36
End: 2021-11-01

## 2021-11-01 VITALS
TEMPERATURE: 97.1 F | OXYGEN SATURATION: 99 % | WEIGHT: 199.8 LBS | HEIGHT: 60 IN | HEART RATE: 90 BPM | BODY MASS INDEX: 39.23 KG/M2 | DIASTOLIC BLOOD PRESSURE: 80 MMHG | SYSTOLIC BLOOD PRESSURE: 110 MMHG

## 2021-11-01 DIAGNOSIS — D80.2 IGA DEFICIENCY, SELECTIVE (HCC): ICD-10-CM

## 2021-11-01 DIAGNOSIS — I10 ESSENTIAL HYPERTENSION: ICD-10-CM

## 2021-11-01 DIAGNOSIS — E66.09 CLASS 2 OBESITY DUE TO EXCESS CALORIES WITHOUT SERIOUS COMORBIDITY WITH BODY MASS INDEX (BMI) OF 38.0 TO 38.9 IN ADULT: ICD-10-CM

## 2021-11-01 DIAGNOSIS — E55.9 HYPOVITAMINOSIS D: ICD-10-CM

## 2021-11-01 DIAGNOSIS — M79.18 MYALGIA OF PELVIC FLOOR: ICD-10-CM

## 2021-11-01 DIAGNOSIS — Q79.60 EHLERS-DANLOS SYNDROME: ICD-10-CM

## 2021-11-01 DIAGNOSIS — G90.A POTS (POSTURAL ORTHOSTATIC TACHYCARDIA SYNDROME): ICD-10-CM

## 2021-11-01 DIAGNOSIS — G25.81 RESTLESS LEG SYNDROME: Primary | ICD-10-CM

## 2021-11-01 PROBLEM — I47.20 PAROXYSMAL VENTRICULAR TACHYCARDIA: Status: RESOLVED | Noted: 2019-08-24 | Resolved: 2021-11-01

## 2021-11-01 PROBLEM — I47.29 PAROXYSMAL VENTRICULAR TACHYCARDIA: Status: RESOLVED | Noted: 2019-08-24 | Resolved: 2021-11-01

## 2021-11-01 PROCEDURE — 99215 OFFICE O/P EST HI 40 MIN: CPT | Performed by: FAMILY MEDICINE

## 2021-11-01 RX ORDER — PRAMIPEXOLE DIHYDROCHLORIDE 0.5 MG/1
0.5 TABLET ORAL NIGHTLY
Qty: 30 TABLET | Refills: 2 | Status: SHIPPED | OUTPATIENT
Start: 2021-11-01 | End: 2022-01-25

## 2021-11-01 NOTE — ASSESSMENT & PLAN NOTE
Diagnosed summer 2021 at [place].  Followed regularly every 6 months by , Q 6 mos for liable hypertension/POTS.

## 2021-11-01 NOTE — PROGRESS NOTES
Chief Complaint  Hypertension (needs new PCP new patient get established)    Subjective          Marycarmen Gauthier presents to [facility]  History of Present Illness  Pleasant 36 YOF here as a new patient.  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  She has had lots of hypermobility issues, unfortunate  labor and vaginal prolapse, dyspareunia.  Overall things are stable right now and seems to be improving.  There are few things for us to address.     From a cardiac standpoint, she does have liable hypertension.  She does take amlodipine for this but does get pots as well with occasional syncope episodes.  She does have to be very careful with lifestyle measures that may increase her blood pressure and others that may worsen her POTS.  She is being followed by Dr Lyman - cardiologist she saw at [place] it seems that these findings are not uncommon for EDS patients.  She will continue to follow with him for autonomic dysfunction every 6 months.    Her gynecologist - LORETTA Gonzales -collaborating with the EDS clinic at [facility] did recommend pelvic floor therapy.  She does have a history of endometriosis, stable currently.  Does see her gynecologist regularly for this.    Pulmonologist at [facility] for restless leg syndrome that is not well controlled on pramipexole 0.25 mg daily.  She did have initial improvement with this but then feels that the medication is no longer effective.    Migraine - Maxalt started this month and helping.  She is getting about 1 a month.  Seeing Dr. Lomas at neurology.    Anxiety and depression, not well controlled.  She was also diagnosed with a form of PTSD.  She does see a psychiatrist and a therapist and marriage therapist for this.  Some of this is related to her own personal health problems, but mostly related to  "her son's health.  He unfortunately suffers with autism and significant behavior manifestations.  They are going through therapy and working to find ways to help him attend school and improve behavior.  This has been extremely stressful and challenging.  She is currently on fluoxetine 40 mg daily which does seem to be helping her symptoms substantially.    Obesity, worsening.  Due to a combination of issues, some related to multiple doctors visit for herself and her son, very busy life, lots of stress and decreased sleep.  She would like to improve her weight but does feel overwhelmed with so many balls in the area right now.    Objective   Vital Signs:   /80   Pulse 90   Temp 97.1 °F (36.2 °C)   Ht 152.4 cm (60\")   Wt 90.6 kg (199 lb 12.8 oz)   SpO2 99%   BMI 39.02 kg/m²     Physical Exam  Vitals and nursing note reviewed.   Constitutional:       General: She is not in acute distress.     Appearance: She is well-developed.   HENT:      Head: Normocephalic.      Nose: Nose normal.   Cardiovascular:      Rate and Rhythm: Normal rate and regular rhythm.      Heart sounds: Normal heart sounds. No murmur heard.      Pulmonary:      Effort: Pulmonary effort is normal. No respiratory distress.      Breath sounds: Normal breath sounds.   Musculoskeletal:         General: Normal range of motion.   Skin:     General: Skin is warm and dry.      Findings: No rash.   Neurological:      Mental Status: She is alert and oriented to person, place, and time.   Psychiatric:         Behavior: Behavior normal.         Thought Content: Thought content normal.         Judgment: Judgment normal.        Result Review :                 Assessment and Plan    Diagnoses and all orders for this visit:    1. Restless leg syndrome (Primary)  -     pramipexole (Mirapex) 0.5 MG tablet; Take 1 tablet by mouth Every Night.  Dispense: 30 tablet; Refill: 2    2. POTS (postural orthostatic tachycardia syndrome)    3. Essential " hypertension  -     Comprehensive Metabolic Panel  -     CBC & Differential  -     TSH Rfx On Abnormal To Free T4    4. Lisbeth-Danlos syndrome  Assessment & Plan:  Diagnosed summer 2021 at [place].  Followed regularly every 6 months by , Q 6 mos for liable hypertension/POTS.    Orders:  -     Ambulatory Referral to Physical Therapy Evaluate and treat, Pelvic Floor    5. IgA deficiency, selective (HCC)    6. Class 2 obesity due to excess calories without serious comorbidity with body mass index (BMI) of 38.0 to 38.9 in adult  -     Lipid Panel    7. Hypovitaminosis D  -     Vitamin D 25 Hydroxy    8. Myalgia of pelvic floor  -     Ambulatory Referral to Physical Therapy Evaluate and treat, Pelvic Floor    Very pleasant 36-year-old female with multiple stressors and unfortunate health complications.  She is very organized, overall has a very positive outlook and willing to seek help to improve her health.  She will be going to [place] twice a year to see a group of specialist that help collaborate to care for her EDS.  If she has moments of hypermobility/dislocation she will contact me for assistance especially to recommend where to proceed next.    From a gynecologic standpoint, she has had some vaginal pain, prolapse that was thought to be related to the EDS as well.  Recommendations for pelvic floor therapy.  Referral to Yolanda Chen as above.  Continue follow-up with her gynecologist.    Patient with IgA deficiency.  She has had one pneumonia shot, no history of hospitalizations or significant pulmonary illnesses.  Her allergist did not recommend that she get every 5 year allergy shots.  Therefore I would recommend that we follow her clinically.  If she seems to get sick more frequently, pulmonary illnesses I would reconsider immunizing again.    Hypertension, well controlled.  Due for labs as above.  Her blood pressure is liable, does seem to respond well to half of a amlodipine 2.5 tablet but  occasionally gets symptoms of pots and will have near syncope or syncopal events.  She is careful about bending forward and stabilizing herself as she is needing to change positions.  It seems that she has found some lifestyle measures to accommodate for this.  Continue to follow-up with cardiology as above.      I spent 61 minutes caring for Marycarmen on this date of service. This time includes time spent by me in the following activities:preparing for the visit, reviewing tests, performing a medically appropriate examination and/or evaluation , counseling and educating the patient/family/caregiver, ordering medications, tests, or procedures, referring and communicating with other health care professionals  and documenting information in the medical record  Follow Up   Return in about 3 months (around 2/1/2022), or if symptoms worsen or fail to improve, for Recheck RLS and EDS.  Patient was given instructions and counseling regarding her condition or for health maintenance advice. Please see specific information pulled into the AVS if appropriate. Medical assistant and I wore mask and eyewear protection during entire encounter.  Patient wore mask.    Hellen Villavicencio MD

## 2021-11-02 LAB
25(OH)D3+25(OH)D2 SERPL-MCNC: 78.4 NG/ML (ref 30–100)
ALBUMIN SERPL-MCNC: 4.7 G/DL (ref 3.8–4.8)
ALBUMIN/GLOB SERPL: 1.7 {RATIO} (ref 1.2–2.2)
ALP SERPL-CCNC: 87 IU/L (ref 44–121)
ALT SERPL-CCNC: 51 IU/L (ref 0–32)
AST SERPL-CCNC: 53 IU/L (ref 0–40)
BASOPHILS # BLD AUTO: 0.1 X10E3/UL (ref 0–0.2)
BASOPHILS NFR BLD AUTO: 1 %
BILIRUB SERPL-MCNC: 0.5 MG/DL (ref 0–1.2)
BUN SERPL-MCNC: 9 MG/DL (ref 6–20)
BUN/CREAT SERPL: 14 (ref 9–23)
CALCIUM SERPL-MCNC: 9.4 MG/DL (ref 8.7–10.2)
CHLORIDE SERPL-SCNC: 100 MMOL/L (ref 96–106)
CHOLEST SERPL-MCNC: 270 MG/DL (ref 100–199)
CO2 SERPL-SCNC: 23 MMOL/L (ref 20–29)
CREAT SERPL-MCNC: 0.66 MG/DL (ref 0.57–1)
EOSINOPHIL # BLD AUTO: 0.1 X10E3/UL (ref 0–0.4)
EOSINOPHIL NFR BLD AUTO: 1 %
ERYTHROCYTE [DISTWIDTH] IN BLOOD BY AUTOMATED COUNT: 13.3 % (ref 11.7–15.4)
GLOBULIN SER CALC-MCNC: 2.8 G/DL (ref 1.5–4.5)
GLUCOSE SERPL-MCNC: 94 MG/DL (ref 65–99)
HCT VFR BLD AUTO: 41.2 % (ref 34–46.6)
HDLC SERPL-MCNC: 39 MG/DL
HGB BLD-MCNC: 13.9 G/DL (ref 11.1–15.9)
IMM GRANULOCYTES # BLD AUTO: 0.1 X10E3/UL (ref 0–0.1)
IMM GRANULOCYTES NFR BLD AUTO: 1 %
LDLC SERPL CALC-MCNC: 158 MG/DL (ref 0–99)
LYMPHOCYTES # BLD AUTO: 2.9 X10E3/UL (ref 0.7–3.1)
LYMPHOCYTES NFR BLD AUTO: 27 %
MCH RBC QN AUTO: 32.3 PG (ref 26.6–33)
MCHC RBC AUTO-ENTMCNC: 33.7 G/DL (ref 31.5–35.7)
MCV RBC AUTO: 96 FL (ref 79–97)
MONOCYTES # BLD AUTO: 0.7 X10E3/UL (ref 0.1–0.9)
MONOCYTES NFR BLD AUTO: 7 %
NEUTROPHILS # BLD AUTO: 6.9 X10E3/UL (ref 1.4–7)
NEUTROPHILS NFR BLD AUTO: 63 %
PLATELET # BLD AUTO: 367 X10E3/UL (ref 150–450)
POTASSIUM SERPL-SCNC: 4.4 MMOL/L (ref 3.5–5.2)
PROT SERPL-MCNC: 7.5 G/DL (ref 6–8.5)
RBC # BLD AUTO: 4.31 X10E6/UL (ref 3.77–5.28)
SODIUM SERPL-SCNC: 140 MMOL/L (ref 134–144)
TRIGL SERPL-MCNC: 387 MG/DL (ref 0–149)
TSH SERPL DL<=0.005 MIU/L-ACNC: 1.76 UIU/ML (ref 0.45–4.5)
VLDLC SERPL CALC-MCNC: 73 MG/DL (ref 5–40)
WBC # BLD AUTO: 10.6 X10E3/UL (ref 3.4–10.8)

## 2021-11-09 NOTE — PROGRESS NOTES
Please call patient back with results.  The labs has resulted as     Normal kidney function, normal blood counts, cholesterol is elevated but similar to prior, vitamin D is normal and thyroid function is normal.     Of note, your liver enzymes are still elevated.  I do think this is likely connected to the elevated cholesterol.  I know we did not have as much time to talk about lifestyle management but I do think decreasing saturated fats, white or simple carbohydrates like white rice, white bread, white pasta will help reduce both the cholesterol and hopefully alleviate some of the elevated liver enzymes.  As these levels have been stable for the past 8 months okay to repeat them at your upcoming appointment in July.  please let us know if you have further questions.     Thank you

## 2021-12-02 ENCOUNTER — TELEPHONE (OUTPATIENT)
Dept: FAMILY MEDICINE CLINIC | Facility: CLINIC | Age: 36
End: 2021-12-02

## 2021-12-02 DIAGNOSIS — M79.18 MYALGIA OF PELVIC FLOOR: ICD-10-CM

## 2021-12-02 DIAGNOSIS — Q79.60 EHLERS-DANLOS SYNDROME: Primary | ICD-10-CM

## 2021-12-02 NOTE — TELEPHONE ENCOUNTER
Caller is Jose, with [facility]. She is needed a new referral faxed over for pelvic floor physical therapy. This is what the last order states, they just need one with an updated date. 12/1/2021 is what they are needing it for. This can be faxed to [phone]. Please advise.

## 2021-12-27 ENCOUNTER — OFFICE VISIT (OUTPATIENT)
Dept: SLEEP MEDICINE | Facility: HOSPITAL | Age: 36
End: 2021-12-27

## 2021-12-27 VITALS
HEIGHT: 60 IN | HEART RATE: 85 BPM | BODY MASS INDEX: 38.68 KG/M2 | DIASTOLIC BLOOD PRESSURE: 71 MMHG | OXYGEN SATURATION: 98 % | WEIGHT: 197 LBS | SYSTOLIC BLOOD PRESSURE: 115 MMHG

## 2021-12-27 DIAGNOSIS — G47.10 HYPERSOMNIA: ICD-10-CM

## 2021-12-27 DIAGNOSIS — E66.9 OBESITY (BMI 30-39.9): ICD-10-CM

## 2021-12-27 DIAGNOSIS — R06.83 SNORING: ICD-10-CM

## 2021-12-27 DIAGNOSIS — G25.81 RESTLESS LEGS SYNDROME (RLS): Primary | ICD-10-CM

## 2021-12-27 PROCEDURE — G0463 HOSPITAL OUTPT CLINIC VISIT: HCPCS

## 2022-01-20 ENCOUNTER — PATIENT MESSAGE (OUTPATIENT)
Dept: FAMILY MEDICINE CLINIC | Facility: CLINIC | Age: 37
End: 2022-01-20

## 2022-01-20 DIAGNOSIS — R05.9 COUGH: Primary | ICD-10-CM

## 2022-01-21 NOTE — TELEPHONE ENCOUNTER
From: Marycarmen Gauthier  To: Hellen Villavicencio MD  Sent: 1/20/2022 10:50 AM EST  Subject: Fatigue and Covid symptoms     Hi there,  I started with a sore throat on 1/10/22. I scheduled the first Covid test I could find that Friday. It came back negative on 1/15/22. Took my son to get tested on 1/17 due to low grade fever and congestion and he was positive for Covid. I tested myself at home on 1/18 and it was still negative. I have a headache, falling asleep frequently, sore throat, tightness in chest, cough and sweats (which I usually get instead of a fever.) Figured I should reach out to you versus an immediate care. I have albuterol inhaler I started today.   Thanks,  Marycarmen

## 2022-01-25 DIAGNOSIS — G25.81 RESTLESS LEG SYNDROME: ICD-10-CM

## 2022-01-25 RX ORDER — PRAMIPEXOLE DIHYDROCHLORIDE 0.5 MG/1
TABLET ORAL
Qty: 30 TABLET | Refills: 2 | Status: SHIPPED | OUTPATIENT
Start: 2022-01-25 | End: 2022-02-01

## 2022-02-01 ENCOUNTER — TELEMEDICINE (OUTPATIENT)
Dept: FAMILY MEDICINE CLINIC | Facility: CLINIC | Age: 37
End: 2022-02-01

## 2022-02-01 VITALS — BODY MASS INDEX: 39.07 KG/M2 | HEIGHT: 60 IN | WEIGHT: 199 LBS

## 2022-02-01 DIAGNOSIS — G47.33 OSA ON CPAP: ICD-10-CM

## 2022-02-01 DIAGNOSIS — K76.0 FATTY LIVER DISEASE, NONALCOHOLIC: Primary | ICD-10-CM

## 2022-02-01 DIAGNOSIS — Q79.60 EHLERS-DANLOS SYNDROME: ICD-10-CM

## 2022-02-01 DIAGNOSIS — Z99.89 OSA ON CPAP: ICD-10-CM

## 2022-02-01 DIAGNOSIS — G25.81 RESTLESS LEG SYNDROME: ICD-10-CM

## 2022-02-01 DIAGNOSIS — M79.18 MYALGIA OF PELVIC FLOOR: ICD-10-CM

## 2022-02-01 PROCEDURE — 99215 OFFICE O/P EST HI 40 MIN: CPT | Performed by: FAMILY MEDICINE

## 2022-02-01 RX ORDER — ROPINIROLE 0.5 MG/1
0.5 TABLET, FILM COATED ORAL NIGHTLY
Qty: 90 TABLET | Refills: 1 | Status: SHIPPED | OUTPATIENT
Start: 2022-02-01 | End: 2022-09-19

## 2022-02-01 NOTE — PROGRESS NOTES
Chief Complaint  Chief Complaint   Patient presents with   • Restless Legs Syndrome     follow up ,no complains    • Hypertension     no complains doing well    • Migraine     doing well no complains        Subjective          Caroline Clay presents to [facility]  History of Present Illness    Pt is a pleasant 37 y.o. YO female here for multiple concerns.      She is post covid a couple months.  She had a possible flair of [place] vs COVID.      Recent labs - findings of HLD and elevated liver enzymes.      JENAE: mild, she did get a CPAP machine that would be cheaper    Pelvic patricio therapy, doing well with [facility] therapy, going to the gym every day at 5 AM, was loosing some weight.  She drinks a lot of caffeine to stay awake,  And has a decreased appetite during the day.  In the past food has been her comfort.  And her  is keto vegan.  She does want to loose weight.     RLS not well controlled but stable - she is on Lyrica and Pramipexole - she has had more episodes of dizziness, and laying down. She did have a change to her BP meds with her EDS cardiologist at [facility].     Objective   Vital Signs:   There were no vitals taken for this visit.    Physical Exam  Constitutional:       General: She is not in acute distress.     Appearance: She is well-developed.   Pulmonary:      Effort: No respiratory distress.   Psychiatric:         Behavior: Behavior normal.         Thought Content: Thought content normal.         Judgment: Judgment normal.       Result Review :   The following data was reviewed by: Hellen Villavicencio MD on 02/01/2022:  CMP    CMP 2/26/21 11/1/21   Glucose 97 94   BUN 8 9   Creatinine 0.65 0.66   eGFR Non  Am 102 112   eGFR African Am 124 130   Sodium 138 140   Potassium 4.5 4.4   Chloride 102 100   Calcium 9.5 9.4   Total Protein 6.8 7.5   Albumin 4.30 4.7   Globulin 2.5 2.8   Total Bilirubin 0.4 0.5   Alkaline Phosphatase 78 87   AST (SGOT) 39 (A) 53 (A)   ALT  (SGPT) 40 (A) 51 (A)   (A) Abnormal value       Comments are available for some flowsheets but are not being displayed.           CBC    CBC 2/26/21 11/1/21   WBC 9.34 10.6   RBC 4.32 4.31   Hemoglobin 14.1 13.9   Hematocrit 41.2 41.2   MCV 95.4 96   MCH 32.6 32.3   MCHC 34.2 33.7   RDW 12.0 (A) 13.3   Platelets 329 367   (A) Abnormal value            Lipid Panel    Lipid Panel 2/26/21 11/1/21   Total Cholesterol 250 (A) 270 (A)   Triglycerides 323 (A) 387 (A)   HDL Cholesterol 37 (A) 39 (A)   VLDL Cholesterol 60 (A) 73 (A)   LDL Cholesterol  153 (A) 158 (A)   (A) Abnormal value       Comments are available for some flowsheets but are not being displayed.                     Assessment and Plan    Diagnoses and all orders for this visit:    1. Fatty liver disease, nonalcoholic (Primary)  Comments:  increased LFTs, continue with plan to exercise, discussed improing nutrient dense foods, decrease over all calories.  Consider apps (RP, loose it)    2. JENAE on CPAP  Comments:  not well controlled, difficult sleeping with mask.  (new dx) Recomended acclimatization therapy.  She is persistent and motivated.     3. Myalgia of pelvic floor  Comments:  Doing well with [facility] therapy     4. Lisbeth-Danlos syndrome  Comments:  working to exercise every day at 5 AM.  Feeling improved over all.     5. Restless leg syndrome  Comments:  Not well controlled, stop pramipexol and start Ropinerole - ok to increase to 2mg as needed. FU 3 months  Orders:  -     rOPINIRole (Requip) 0.5 MG tablet; Take 1 tablet by mouth Every Night. Take 1 hour before bedtime.  Dispense: 90 tablet; Refill: 1      I spent 43 minutes caring for Marycarmen on this date of service. This time includes time spent by me in the following activities:preparing for the visit, reviewing tests, performing a medically appropriate examination and/or evaluation , counseling and educating the patient/family/caregiver, referring and communicating with other health care  professionals  and documenting information in the medical record  Follow Up   No follow-ups on file.  Patient was given instructions and counseling regarding her condition or for health maintenance advice. Please see specific information pulled into the AVS if appropriate. Mask and protective eyewear worn by myself and medical assistant during entire patient encounter.    Hellen Villavicencio MD    This was an audio and video enabled telemedicine encounter.

## 2022-04-25 RX ORDER — PRAMIPEXOLE DIHYDROCHLORIDE 0.5 MG/1
TABLET ORAL
Qty: 30 TABLET | Refills: 3 | Status: SHIPPED | OUTPATIENT
Start: 2022-04-25 | End: 2022-08-23

## 2022-04-25 NOTE — TELEPHONE ENCOUNTER
Rx Refill Note  Requested Prescriptions     Pending Prescriptions Disp Refills   • pramipexole (MIRAPEX) 0.5 MG tablet [Pharmacy Med Name: PRAMIPEXOLE 0.5 MG TABLET] 30 tablet      Sig: TAKE ONE TABLET BY MOUTH ONCE NIGHTLY      Last office visit with prescribing clinician: 11/1/2021      Next office visit with prescribing clinician: Visit date not found            Laron Sanchez MA  04/25/22, 09:30 EDT

## 2022-08-01 ENCOUNTER — TELEPHONE (OUTPATIENT)
Dept: FAMILY MEDICINE CLINIC | Facility: CLINIC | Age: 37
End: 2022-08-01

## 2022-08-01 NOTE — TELEPHONE ENCOUNTER
Caller: Marycarmen Gauthier    Relationship: Self    Best call back number: [phone]    What is the best time to reach you: ANYTIME    Who are you requesting to speak with (clinical staff, provider,  specific staff member): CLINICAL    What was the call regarding: PATIENT IS HAVING TROUBLE GOING TO SLEEP AT NIGHT DUE TO HER WHOLE BODY BEING RESTLESS AND UNABLE TO SETTLE DOWN. SHE HAS MADE APPOINTMENT FOR NEXT AVAILABLE WITH DR RYAN, 09/19/22 BUT WOULD LIKE TO KNOW WHAT SHE CAN DO IN THE MEANTIME TO HELP HER OUT.     PLEASE CALL TO DISCUSS AND ADVISE

## 2022-08-01 NOTE — TELEPHONE ENCOUNTER
It may be helpful to consider an over-the-counter iron pill, sometimes iron deficiency will manifest as restless legs.  Also taking a warm bath, stretches with magnesium 400 mg before sleep can help the muscles relax.

## 2022-08-23 RX ORDER — PRAMIPEXOLE DIHYDROCHLORIDE 0.5 MG/1
TABLET ORAL
Qty: 30 TABLET | Refills: 3 | Status: SHIPPED | OUTPATIENT
Start: 2022-08-23 | End: 2022-11-14

## 2022-09-19 ENCOUNTER — OFFICE VISIT (OUTPATIENT)
Dept: FAMILY MEDICINE CLINIC | Facility: CLINIC | Age: 37
End: 2022-09-19

## 2022-09-19 VITALS
HEIGHT: 60 IN | BODY MASS INDEX: 40.64 KG/M2 | SYSTOLIC BLOOD PRESSURE: 112 MMHG | DIASTOLIC BLOOD PRESSURE: 78 MMHG | HEART RATE: 69 BPM | OXYGEN SATURATION: 99 % | TEMPERATURE: 97.8 F | WEIGHT: 207 LBS

## 2022-09-19 DIAGNOSIS — E66.09 CLASS 2 OBESITY DUE TO EXCESS CALORIES WITHOUT SERIOUS COMORBIDITY WITH BODY MASS INDEX (BMI) OF 38.0 TO 38.9 IN ADULT: Primary | ICD-10-CM

## 2022-09-19 DIAGNOSIS — E78.01 FAMILIAL HYPERCHOLESTEROLEMIA: ICD-10-CM

## 2022-09-19 DIAGNOSIS — I10 ESSENTIAL HYPERTENSION: ICD-10-CM

## 2022-09-19 DIAGNOSIS — G25.81 RESTLESS LEG SYNDROME: ICD-10-CM

## 2022-09-19 DIAGNOSIS — R79.9 ABNORMAL FINDING OF BLOOD CHEMISTRY, UNSPECIFIED: ICD-10-CM

## 2022-09-19 DIAGNOSIS — G90.A POTS (POSTURAL ORTHOSTATIC TACHYCARDIA SYNDROME): ICD-10-CM

## 2022-09-19 DIAGNOSIS — L70.8 FOLLICULAR ACNE: ICD-10-CM

## 2022-09-19 DIAGNOSIS — K76.0 FATTY LIVER DISEASE, NONALCOHOLIC: ICD-10-CM

## 2022-09-19 PROCEDURE — 99214 OFFICE O/P EST MOD 30 MIN: CPT | Performed by: FAMILY MEDICINE

## 2022-09-19 RX ORDER — PREGABALIN 75 MG/1
75 CAPSULE ORAL 2 TIMES DAILY
COMMUNITY
Start: 2022-06-07

## 2022-09-19 RX ORDER — FLUOXETINE HYDROCHLORIDE 20 MG/1
CAPSULE ORAL
COMMUNITY
Start: 2022-08-01 | End: 2022-09-19

## 2022-09-19 NOTE — PROGRESS NOTES
"Chief Complaint  Restless Legs Syndrome (Follow up on ropinirole ) and Skin Problem (Sores they dont want to heal went to derm doctor but they donyt  want to go away )    Subjective        Marycarmen Gauthier presents to [facility]  History of Present Illness  Pleasant 37-year-old female here for restless leg syndrome which is not well controlled.  She is currently on ropinirole 0.5 mg nightly and Lyrica 75 mg twice a day.  She does have known Lisbeth-Danlos syndrome.  She has lots of stress, her son has been refusing to go to school - it has been very complexed - her psychiatrist has helped increasing her prozac.     POTS: Well-controlled on Guamfacine - she ois now functioning and doing better.  Followed by [facility] specialty group for autonomic dysfunction at [place]    Obesity: worsening - thinking that she is overeating, she is walking on the treadmill. Derm made recommendations for seeping PCOS.  Establishing with a new GYN in Nov.  She has been doing wieght watchers, many diets in the pst and now she does not have s much emotional energy.     Rash: Present on her buttocks legs and back.  Almost as small pimples with scratch marks, they have been present over 5 months now.  She did see her dermatologist for them and had some steroid injections which was not helpful.    RLS: She has one night where she does use her CPAP and not using it as much, she has an autoset trying to find  Mas to wear        Objective   Vital Signs:  /78   Pulse 69   Temp 97.8 °F (36.6 °C)   Ht 152.4 cm (60\")   Wt 93.9 kg (207 lb)   SpO2 99%   BMI 40.43 kg/m²   Estimated body mass index is 40.43 kg/m² as calculated from the following:    Height as of this encounter: 152.4 cm (60\").    Weight as of this encounter: 93.9 kg (207 lb).          Physical Exam  Vitals and nursing note reviewed.   Constitutional:       General: She is not in acute distress.     Appearance: She is well-developed.   HENT:      " Head: Normocephalic.      Nose: Nose normal.   Cardiovascular:      Rate and Rhythm: Normal rate and regular rhythm.      Heart sounds: Normal heart sounds. No murmur heard.  Pulmonary:      Effort: Pulmonary effort is normal. No respiratory distress.      Breath sounds: Normal breath sounds.   Musculoskeletal:         General: Normal range of motion.   Skin:     General: Skin is warm and dry.      Findings: No rash.   Neurological:      Mental Status: She is alert and oriented to person, place, and time.   Psychiatric:         Behavior: Behavior normal.         Thought Content: Thought content normal.         Judgment: Judgment normal.        Result Review :                Assessment and Plan   Diagnoses and all orders for this visit:    1. Class 2 obesity due to excess calories without serious comorbidity with body mass index (BMI) of 38.0 to 38.9 in adult (Primary)  -     Comprehensive Metabolic Panel  -     CBC & Differential  -     Lipid Panel  -     Vitamin D 25 Hydroxy  -     TSH Rfx On Abnormal To Free T4  -     Testosterone (Free & Total), LC / MS  -     Hemoglobin A1c  -     Insulin, Free & Total, Serum    2. Familial hypercholesterolemia    3. Restless leg syndrome  -     CBC & Differential  -     Ferritin    4. Fatty liver disease, nonalcoholic  -     Comprehensive Metabolic Panel  -     Lipid Panel    5. Essential hypertension  -     Comprehensive Metabolic Panel  -     CBC & Differential  -     Lipid Panel  -     TSH Rfx On Abnormal To Free T4    6. POTS (postural orthostatic tachycardia syndrome)    7. Abnormal finding of blood chemistry, unspecified   -     Ferritin  -     Hemoglobin A1c    8. Follicular acne  -     mupirocin (BACTROBAN) 2 % ointment; Apply 1 application topically to the appropriate area as directed 3 (Three) Times a Day.  Dispense: 30 g; Refill: 1    Very pleasant 37-year-old female with a complex medical history including EDS, POTS syndrome, and I do agree likely PCOS.  She has  been having gradual and progressive weight gain.  She has substantial stress with caring for her child with special needs with many behavior concerns.  This greatly alters her sleep and day-to-day activity and ability to have a routine or care for herself well.  It does look like they have been able to get some insurance help and Medicaid waivers in order for her son to get occupational therapy which has been very helpful.  As result she is able to invest more time and her own health.  I do think doing evaluation for PCOS is warranted, we also discussed some lifestyle management options but I think the first part will be for her to have full assessment of labs and to start with making a routine.  Thankfully she is very organized, very kind and receptive to help.  I think this will allow her really to optimize the resources around her and make the best used to improve her health and her family's health.    Plan:  - Labs as above  - Okay to use with [facility] for what looks like a follicular outbreak, follow-up with dermatology if not improving.  - Continue follow-up with [place] and GYN for autonomic dysfunction, EDS and possible PCOS.    Restless leg syndrome, chronic problem.  She has failed ropinirole, pramipexole, gabapentin.  She is on Lyrica now.  We will check ferritin levels, if below 75 will consider iron infusion to see if this helps her symptoms.         Follow Up   Return in about 3 months (around 12/19/2022), or if symptoms worsen or fail to improve, for Recheck obesity .  Patient was given instructions and counseling regarding her condition or for health maintenance advice. Please see specific information pulled into the AVS if appropriate. Medical assistant and I wore mask and eyewear protection during entire encounter.  Patient wore mask.    Hellen Villavicencio MD          Answers for HPI/ROS submitted by the patient on 9/18/2022  Please describe your symptoms.: Restlessness, red swollen chin  Have you had  these symptoms before?: Yes  How long have you been having these symptoms?: Greater than 2 weeks  What is the primary reason for your visit?: Other

## 2022-09-30 LAB
25(OH)D3+25(OH)D2 SERPL-MCNC: 81.9 NG/ML (ref 30–100)
ALBUMIN SERPL-MCNC: 4.4 G/DL (ref 3.5–5.2)
ALBUMIN/GLOB SERPL: 1.8 G/DL
ALP SERPL-CCNC: 82 U/L (ref 39–117)
ALT SERPL-CCNC: 42 U/L (ref 1–33)
AST SERPL-CCNC: 45 U/L (ref 1–32)
BASOPHILS # BLD AUTO: 0.04 10*3/MM3 (ref 0–0.2)
BASOPHILS NFR BLD AUTO: 0.5 % (ref 0–1.5)
BILIRUB SERPL-MCNC: 0.3 MG/DL (ref 0–1.2)
BUN SERPL-MCNC: 11 MG/DL (ref 6–20)
BUN/CREAT SERPL: 17.7 (ref 7–25)
CALCIUM SERPL-MCNC: 9.6 MG/DL (ref 8.6–10.5)
CHLORIDE SERPL-SCNC: 102 MMOL/L (ref 98–107)
CHOLEST SERPL-MCNC: 252 MG/DL (ref 0–200)
CO2 SERPL-SCNC: 25.2 MMOL/L (ref 22–29)
CREAT SERPL-MCNC: 0.62 MG/DL (ref 0.57–1)
EGFRCR SERPLBLD CKD-EPI 2021: 116.3 ML/MIN/1.73
EOSINOPHIL # BLD AUTO: 0.16 10*3/MM3 (ref 0–0.4)
EOSINOPHIL NFR BLD AUTO: 1.8 % (ref 0.3–6.2)
ERYTHROCYTE [DISTWIDTH] IN BLOOD BY AUTOMATED COUNT: 12.7 % (ref 12.3–15.4)
FERRITIN SERPL-MCNC: 142 NG/ML (ref 13–150)
GLOBULIN SER CALC-MCNC: 2.4 GM/DL
GLUCOSE SERPL-MCNC: 97 MG/DL (ref 65–99)
HBA1C MFR BLD: 5.6 % (ref 4.8–5.6)
HCT VFR BLD AUTO: 41.4 % (ref 34–46.6)
HDLC SERPL-MCNC: 40 MG/DL (ref 40–60)
HGB BLD-MCNC: 13.7 G/DL (ref 12–15.9)
IMM GRANULOCYTES # BLD AUTO: 0.07 10*3/MM3 (ref 0–0.05)
IMM GRANULOCYTES NFR BLD AUTO: 0.8 % (ref 0–0.5)
INSULIN FREE SERPL-ACNC: 26 UU/ML
INSULIN SERPL-ACNC: 26 UU/ML
LDLC SERPL CALC-MCNC: 153 MG/DL (ref 0–100)
LYMPHOCYTES # BLD AUTO: 2.61 10*3/MM3 (ref 0.7–3.1)
LYMPHOCYTES NFR BLD AUTO: 29.8 % (ref 19.6–45.3)
MCH RBC QN AUTO: 31.9 PG (ref 26.6–33)
MCHC RBC AUTO-ENTMCNC: 33.1 G/DL (ref 31.5–35.7)
MCV RBC AUTO: 96.3 FL (ref 79–97)
MONOCYTES # BLD AUTO: 0.91 10*3/MM3 (ref 0.1–0.9)
MONOCYTES NFR BLD AUTO: 10.4 % (ref 5–12)
NEUTROPHILS # BLD AUTO: 4.96 10*3/MM3 (ref 1.7–7)
NEUTROPHILS NFR BLD AUTO: 56.7 % (ref 42.7–76)
NRBC BLD AUTO-RTO: 0 /100 WBC (ref 0–0.2)
PLATELET # BLD AUTO: 346 10*3/MM3 (ref 140–450)
POTASSIUM SERPL-SCNC: 5.1 MMOL/L (ref 3.5–5.2)
PROT SERPL-MCNC: 6.8 G/DL (ref 6–8.5)
RBC # BLD AUTO: 4.3 10*6/MM3 (ref 3.77–5.28)
SODIUM SERPL-SCNC: 138 MMOL/L (ref 136–145)
TESTOST FREE SERPL-MCNC: 1.6 PG/ML (ref 0–4.2)
TESTOST SERPL-MCNC: 19.7 NG/DL (ref 10–55)
TRIGL SERPL-MCNC: 316 MG/DL (ref 0–150)
TSH SERPL DL<=0.005 MIU/L-ACNC: 2.27 UIU/ML (ref 0.27–4.2)
VLDLC SERPL CALC-MCNC: 59 MG/DL (ref 5–40)
WBC # BLD AUTO: 8.75 10*3/MM3 (ref 3.4–10.8)

## 2022-10-10 ENCOUNTER — TELEPHONE (OUTPATIENT)
Dept: FAMILY MEDICINE CLINIC | Facility: CLINIC | Age: 37
End: 2022-10-10

## 2022-10-10 NOTE — TELEPHONE ENCOUNTER
Patient contacted and informed disability papers are complete. Patient will  forms in the office, aware of $20 fee. Copy of both sets (2018, 2021, and 2022) in patient's media.

## 2022-10-31 RX ORDER — MONTELUKAST SODIUM 10 MG/1
10 TABLET ORAL
Qty: 90 TABLET | Refills: 1 | Status: SHIPPED | OUTPATIENT
Start: 2022-10-31

## 2022-11-14 RX ORDER — PRAMIPEXOLE DIHYDROCHLORIDE 0.5 MG/1
TABLET ORAL
Qty: 90 TABLET | Refills: 1 | Status: SHIPPED | OUTPATIENT
Start: 2022-11-14

## 2022-11-21 ENCOUNTER — TELEPHONE (OUTPATIENT)
Dept: FAMILY MEDICINE CLINIC | Facility: CLINIC | Age: 37
End: 2022-11-21

## 2022-11-21 NOTE — TELEPHONE ENCOUNTER
Caller: Marycarmen Gauthier    Relationship to patient: Self    Best call back number: [phone]      Date of exposure: FEW DAYS AGO    Date of positive COVID19 test: 11/21/22        COVID19 symptoms: SOME SOB SHE SAYS ON LEFT SIDE, COUGH, HEADACHE AND CHILLS         Additional information or concerns: PATIENT STATES SHE HAS [facility] AND [facility] TODAY THEY DID SAY SHE HAS UPPER RESPIRATORY  BUT NOW SHE IS ALSO TESTING POSITIVE FOR COVID AND SHE IS JUST WANTING TO GET SOME GUIDANCE ON WHAT TO DO.

## 2022-11-21 NOTE — TELEPHONE ENCOUNTER
I would recommend supportive care, she does qualify for Paxlovid which is an antiviral treatment.  If her symptoms are extremely mild I think just continuing with a normal cold remedy would be sufficient.  However if her symptoms are more significant the Paxlovid can be helpful although I have had more patients get rebound symptoms after stopping the Paxlovid.

## 2023-02-14 ENCOUNTER — OFFICE VISIT (OUTPATIENT)
Dept: CARDIOLOGY | Facility: CLINIC | Age: 38
End: 2023-02-14
Payer: MEDICARE

## 2023-02-14 VITALS
DIASTOLIC BLOOD PRESSURE: 70 MMHG | HEIGHT: 60 IN | BODY MASS INDEX: 42.41 KG/M2 | SYSTOLIC BLOOD PRESSURE: 110 MMHG | WEIGHT: 216 LBS | HEART RATE: 75 BPM

## 2023-02-14 DIAGNOSIS — Q79.60 EHLERS-DANLOS SYNDROME: ICD-10-CM

## 2023-02-14 DIAGNOSIS — G90.A POTS (POSTURAL ORTHOSTATIC TACHYCARDIA SYNDROME): Primary | ICD-10-CM

## 2023-02-14 DIAGNOSIS — E78.01 FAMILIAL HYPERCHOLESTEROLEMIA: ICD-10-CM

## 2023-02-14 DIAGNOSIS — I10 ESSENTIAL HYPERTENSION: ICD-10-CM

## 2023-02-14 DIAGNOSIS — R07.89 OTHER CHEST PAIN: ICD-10-CM

## 2023-02-14 DIAGNOSIS — J45.20 MILD INTERMITTENT REACTIVE AIRWAY DISEASE WITHOUT COMPLICATION: ICD-10-CM

## 2023-02-14 DIAGNOSIS — R06.09 DYSPNEA ON EXERTION: ICD-10-CM

## 2023-02-14 DIAGNOSIS — M35.7 HYPERMOBILITY SYNDROME: ICD-10-CM

## 2023-02-14 PROCEDURE — 99214 OFFICE O/P EST MOD 30 MIN: CPT | Performed by: NURSE PRACTITIONER

## 2023-02-14 PROCEDURE — 93000 ELECTROCARDIOGRAM COMPLETE: CPT | Performed by: NURSE PRACTITIONER

## 2023-02-14 RX ORDER — AMLODIPINE BESYLATE 10 MG/1
10 TABLET ORAL DAILY
COMMUNITY
Start: 2023-01-25 | End: 2023-03-08

## 2023-02-14 RX ORDER — HYDROXYZINE HYDROCHLORIDE 25 MG/1
12.5 TABLET, FILM COATED ORAL NIGHTLY PRN
COMMUNITY
Start: 2023-02-06

## 2023-02-14 NOTE — PROGRESS NOTES
[facility] CARDIOLOGY  [address]  [facility] [address]  [facility] [address]  Phone: [phone]  Patient Name: Marycarmen Gauthier  :1985  Age: 38 y.o.  Primary Cardiologist: Jazz Hardin MD  Encounter Provider:  MAGGI Patten    History of Present Illness     Marycarmen Gauthier is a 38 y.o.  female whose medical history includes hypertension, fibromyalgia, hypermobile joints/sicca syndrome (Dr. Crook), posttraumatic stress disorder, anxiety, Lyme disease, hysterectomy 2018..  She is followed in our office by Dr. Hardin for POTS and Erler Danlos syndrome III. I have reviewed the past medical records in preparation of today's visit.     23 Follow-up:  She is here for yearly follow-up and I am seeing her for the first time today.  She had an episode of chest pain last week that she describes as a deep ache that radiated to both arms.  It lasted for about 2 minutes.  Since that time she has had more frequent episodes of her usual chest pain that she has with POTS.  She continues to have stressors in life; her parents and her in-laws have been ill and she also cares full-time for her son.  Her psychiatrist prescribed hydroxyzine which has been helping.  She saw the [place] [facility] in  for adrenaline surges; guanfacine was stopped and she was started on a diazepam patch which made her feel terrible.  That was stopped and she is now on guanfacine 2 mg twice daily.  Her amlodipine is now 10 mg daily.  She had bilateral meniscus repair in  and has been having more leg swelling; she is using compression stockings.  Her heart rate is relatively controlled with most readings being 90s but occasionally up to 130s.  Her breathing is comfortable.  She has been having trouble exercising due to the meniscus repair and injuries.  She is taking her medications as prescribed.    Data Review     The following data was reviewed by Whit  "MAGGI Gonzalez on 23:    Vital Signs:   /70   Pulse 75   Ht 152.4 cm (60\")   Wt 98 kg (216 lb)   BMI 42.18 kg/m²       Weight:  Wt Readings from Last 3 Encounters:   23 98 kg (216 lb)   22 90.7 kg (200 lb)   22 93.9 kg (207 lb)     Body mass index is 42.18 kg/m².    Below is a summary of pertinent cardiology findings:  • She is  and has 1 child.  She is an RN.  She does not smoke, rarely uses alcohol, drinks 1 caffeinated beverage daily.  There is no family history of premature cardiovascular disease.  •  she had premature rupture of membranes and had her son prematurely at 29 weeks; he was in  intensive care for 3 months.  She was very nervous after that, was also having nausea and sweating with episodes of anxiety, short windedness, and tachycardia.  He is hyperactive and has been diagnosed with autism.  • 2017 tilt table study showed inappropriate sinus tachycardia but no evidence of vasovagal syncope; tachycardia occurred after nitroglycerin.  • 2017 echocardiogram was normal.  • 2018 evaluated at [facility] for POTS.  She had normal QSART so no peripheral autonomic neuropathy.  Tilt table showed postural tachycardia but stable blood pressure; she was recommended exercise, consideration of beta-blockers, increased sodium and fluid intake, sleeping with head of bed elevated, compression socks, and avoiding alcohol and large meals.  • 2020 evaluated at [facility] for POTS management.  • Propranolol has been stopped due to the use of inhalers and allergies.    Labs:  • 2022:  cr 0.6, K 5.1, ALT 42, AST 45, otherwise unremarkable CMP, HgbA1c 5.6, TSH 2.270, Chol 252, HDL 40, , Trig 316, Hgb 13.7, Plt 346      ECG 12 Lead    Date/Time: 2023 9:37 AM  Performed by: Whit Everett APRN  Authorized by: Whit Everett APRN   Comparison: compared with previous ECG from " 9/7/2021  Similar to previous ECG  Rhythm: sinus rhythm  BPM: 75  T flattening: aVL and V2    Clinical impression: non-specific ECG            Medications     Allergies as of 02/14/2023 - Reviewed 02/14/2023   Allergen Reaction Noted   • Diphenhydramine Other (See Comments) 08/03/2017   • Latex Rash 04/30/2015   • Latex, natural rubber Rash 01/10/2020   • Sulfa antibiotics Rash 01/10/2020   • Sulfamethoxazole-trimethoprim Itching and Rash 01/01/2016         Current Outpatient Medications:   •  acetaminophen (TYLENOL) 650 MG 8 hr tablet, Take 650 mg by mouth As Needed for Mild Pain ., Disp: , Rfl:   •  ALBUTEROL SULFATE HFA IN, Inhale., Disp: , Rfl:   •  amLODIPine (NORVASC) 10 MG tablet, Take 10 mg by mouth Daily., Disp: , Rfl:   •  azelastine (ASTELIN) 0.1 % nasal spray, 2 sprays into the nostril(s) as directed by provider 2 (Two) Times a Day. Use in each nostril as directed, Disp: 30 mL, Rfl: 3  •  beclomethasone (QVAR) 40 MCG/ACT inhaler, Inhale 2 puffs 2 (Two) Times a Day., Disp: , Rfl:   •  cycloSPORINE (RESTASIS) 0.05 % ophthalmic emulsion, 1 drop 2 (Two) Times a Day., Disp: , Rfl:   •  diclofenac (VOLTAREN) 1 % gel gel, Apply 4 g topically to the appropriate area as directed 4 (Four) Times a Day As Needed., Disp: , Rfl:   •  Fexofenadine HCl (ALLEGRA ALLERGY PO), Take 180 mg by mouth As Needed., Disp: , Rfl:   •  FLUoxetine (PROzac) 60 MG tablet, Take 60 mg by mouth Daily., Disp: , Rfl:   •  guanFACINE (TENEX) 1 MG tablet, Take 2 mg by mouth 2 (two) times a day., Disp: , Rfl:   •  hydrOXYzine (ATARAX) 25 MG tablet, Take 12.5 mg by mouth At Night As Needed., Disp: , Rfl:   •  L-THEANINE PO, Take 200 mg by mouth 2 (Two) Times a Day., Disp: , Rfl:   •  mometasone (NASONEX) 50 MCG/ACT nasal spray, 2 sprays into the nostril(s) as directed by provider 2 (two) times a day., Disp: , Rfl:   •  montelukast (SINGULAIR) 10 MG tablet, Take 1 tablet by mouth every night at bedtime., Disp: 90 tablet, Rfl: 1  •  mupirocin  (BACTROBAN) 2 % ointment, Apply 1 application topically to the appropriate area as directed 3 (Three) Times a Day., Disp: 30 g, Rfl: 1  •  Omeprazole 20 MG tablet delayed-release, Take 40 mg by mouth Daily., Disp: , Rfl:   •  pramipexole (MIRAPEX) 0.5 MG tablet, TAKE ONE TABLET BY MOUTH ONCE NIGHTLY, Disp: 90 tablet, Rfl: 1  •  pregabalin (LYRICA) 75 MG capsule, Take 75 mg by mouth 2 (Two) Times a Day., Disp: , Rfl:   •  PreviDent 5000 Sensitive 1.1-5 % paste, , Disp: , Rfl:   •  rizatriptan (Maxalt) 10 MG tablet, Take 1 tablet by mouth 1 (One) Time As Needed for Migraine. May repeat in 2 hours if needed, Disp: 30 tablet, Rfl: 1  •  triamcinolone (KENALOG) 0.5 % ointment, Apply pea-sized portion to vulva nightly for 2 weeks, then continue 2 times per week, Disp: , Rfl:   •  valACYclovir (VALTREX) 500 MG tablet, Take 500 mg by mouth Daily., Disp: , Rfl: 0  •  vitamin B-12 (CYANOCOBALAMIN) 100 MCG tablet, Take 300 mcg by mouth Daily., Disp: , Rfl:      Past History, Review of Systems, Exam     Past Medical History:   Diagnosis Date   • Allergic 01/01/1986   • Anxiety    • Asthma    • Autonomic dysfunction 2017   • Bulging lumbar disc 2015   • Chronic pain    • Chronic sinus infection    • Coronary artery disease    • Depression    • Dizziness    • VICKERS (dyspnea on exertion)    • Essential hypertension    • Fatigue    • Fibromyalgia    • Fibromyalgia, primary 01/01/2015   • Flexor hallucis longus tendinitis 06/2013   • GERD (gastroesophageal reflux disease) 01/01/1998   • H/O Bilateral carpal tunnel syndrome    • H/O transfusion of packed red blood cells    • History of abnormal cervical Pap smear 2011   • History of anemia    • History of infectious mononucleosis    • History of medical problems    • History of tachycardia    • Hyperlipidemia    • Hypermobile joint syndrome of multiple sites    • Hypermobile joints    • Irritable bowel syndrome 2011   • Kidney stone    • Leukocytosis    • Lyme disease 08/24/2019   •  Numbness and tingling in both hands    • Obesity    • Osteoarthritis    • Palpitations    • POTS (postural orthostatic tachycardia syndrome) 2017   • PROM (premature rupture of membranes)    • PTSD (post-traumatic stress disorder) 2014   • Seasonal allergies    • Sleep apnea 2022   • Snoring    • SOB (shortness of breath)    • Syncope and collapse    • Visual impairment        Past Surgical History:   has a past surgical history that includes Ankle surgery (Right, );  section (); Rotator cuff repair (Right, ); Sinus surgery (); Carpal tunnel release (Right, 2017); Breast surgery (10/15/2013); [place] tooth extraction; Hysterectomy (2018); Laparoscopic cholecystectomy w/ cholangiography; Cholecystectomy (2019); Joint replacement; Colonoscopy (2017); and Appendectomy ().     Social History     Socioeconomic History   • Marital status:      Spouse name: Roe   • Number of children: 1   • Years of education: College   Tobacco Use   • Smoking status: Never   • Smokeless tobacco: Never   • Tobacco comments:     CAFFEINE USE   Vaping Use   • Vaping Use: Never used   Substance and Sexual Activity   • Alcohol use: Not Currently     Comment: 1 drink every few months socially   • Drug use: No   • Sexual activity: Yes     Partners: Male     Birth control/protection: Surgical, None     Comment: , one son       Review of Systems   Cardiovascular: Positive for chest pain and palpitations. Negative for claudication, cyanosis, dyspnea on exertion, irregular heartbeat, leg swelling, near-syncope, orthopnea, paroxysmal nocturnal dyspnea and syncope.   Psychiatric/Behavioral: The patient is nervous/anxious.        Vitals reviewed.   Constitutional:       Appearance: Not in distress.   Eyes:      Conjunctiva/sclera: Conjunctivae normal.      Pupils: Pupils are equal, round, and reactive to light.   HENT:      Head: Normocephalic.      Nose: Nose normal.     Mouth/Throat:      Pharynx: Oropharynx is clear.   Neck:      Vascular: JVD normal.   Pulmonary:      Effort: Pulmonary effort is normal.      Breath sounds: Normal breath sounds. No wheezing. No rhonchi. No rales.   Cardiovascular:      Normal rate. Regular rhythm. Normal S1. Normal S2.      Murmurs: There is no murmur.   Pulses:     Intact distal pulses.   Edema:     Pretibial: bilateral trace edema of the pretibial area.  Abdominal:      General: Bowel sounds are normal. There is no distension.      Palpations: Abdomen is soft.      Tenderness: There is no abdominal tenderness.   Musculoskeletal: Normal range of motion.      Cervical back: Normal range of motion and neck supple. Skin:     General: Skin is warm and dry.   Neurological:      Mental Status: Alert and oriented to person, place and time.   Psychiatric:         Attention and Perception: Attention normal.         Mood and Affect: Mood normal.         Speech: Speech normal.         Behavior: Behavior is cooperative.          Assessment and Plan     Assessment:  1. POTS (postural orthostatic tachycardia syndrome)    2. Essential hypertension    3. Familial hypercholesterolemia    4. Lisbeth-Danlos syndrome    5. Hypermobility syndrome    6. Mild intermittent reactive airway disease without complication    7. Dyspnea on exertion    8. Other chest pain         1. POTS: She had a tilt table study in December 2017 showing inappropriate sinus tachycardia but no evidence of vasovagal syncope.  She was evaluated at the [facility] for POTS in April 2018 and had a normal QSART suggestive of not being peripheral autonomic neuropathy.  Tilt table study postural tachycardia but stable blood pressure.  She was also evaluated at [facility] in December 2020.  Propanolol was stopped due to use of inhalers and her allergies.  Overall she describes decent control of her POTS symptoms.  She is having trouble exercising due to injury and  recovery.  2. Hypertension: Controlled on 2 mg guanfacine twice daily and 10 mg amlodipine daily.  She is having more leg swelling.  3. Hyperlipidemia: Her LDL was not at goal when checked in 2022.  This is being managed by Dr. Villavicencio.  4. Erler's Danlos syndrome: She has hypermobility.  5. Hypermobility syndrome: She follows with Dr. Crook.  6. Reactive airway disease which is controlled.  7. Dyspnea on exertion: She has had a few more episodes since she had chest pain last week.  8. Chest pain: This is described as a deep ache with radiation to both arms.    Ms. Vicente is a patient of Dr. Hardin's with POTS.  Her symptoms are relatively well controlled; she has been evaluated at [facility] in [facility].  She is unable to exercise right now due to recovery from meniscus repair and injury.  She did have an episode of chest pain which is different than her usual chest pain symptoms.  I will check a treadmill stress test and an echocardiogram.  For now I will schedule her to see Dr. Hardin in 6 months.    Return in about 6 months (around 8/14/2023) for Follow-up with Dr. Hardin.  Orders Placed This Encounter   Procedures   • Treadmill Stress Test   • ECG 12 Lead   • Adult Transthoracic Echo Complete W/ Cont if Necessary Per Protocol      No orders of the defined types were placed in this encounter.        Thank you for the opportunity to participate in this patient's care.    MAGGI Gonzalez    This office note has been dictated.

## 2023-02-23 ENCOUNTER — OFFICE VISIT (OUTPATIENT)
Dept: FAMILY MEDICINE CLINIC | Facility: CLINIC | Age: 38
End: 2023-02-23
Payer: MEDICARE

## 2023-02-23 VITALS
BODY MASS INDEX: 42.76 KG/M2 | HEART RATE: 87 BPM | SYSTOLIC BLOOD PRESSURE: 122 MMHG | WEIGHT: 217.8 LBS | HEIGHT: 60 IN | DIASTOLIC BLOOD PRESSURE: 76 MMHG | TEMPERATURE: 97.3 F | OXYGEN SATURATION: 100 %

## 2023-02-23 DIAGNOSIS — R79.9 ABNORMAL FINDING OF BLOOD CHEMISTRY, UNSPECIFIED: ICD-10-CM

## 2023-02-23 DIAGNOSIS — E66.09 CLASS 2 OBESITY DUE TO EXCESS CALORIES WITHOUT SERIOUS COMORBIDITY WITH BODY MASS INDEX (BMI) OF 38.0 TO 38.9 IN ADULT: Primary | ICD-10-CM

## 2023-02-23 DIAGNOSIS — R06.09 DOE (DYSPNEA ON EXERTION): ICD-10-CM

## 2023-02-23 DIAGNOSIS — E88.81 INSULIN RESISTANCE: ICD-10-CM

## 2023-02-23 DIAGNOSIS — R63.5 ABNORMAL WEIGHT GAIN: ICD-10-CM

## 2023-02-23 DIAGNOSIS — K76.0 FATTY LIVER DISEASE, NONALCOHOLIC: ICD-10-CM

## 2023-02-23 DIAGNOSIS — E78.01 FAMILIAL HYPERCHOLESTEROLEMIA: ICD-10-CM

## 2023-02-23 DIAGNOSIS — N90.4 LICHEN SCLEROSUS OF VULVA: ICD-10-CM

## 2023-02-23 DIAGNOSIS — Q79.60 EHLERS-DANLOS SYNDROME: ICD-10-CM

## 2023-02-23 DIAGNOSIS — G90.A POTS (POSTURAL ORTHOSTATIC TACHYCARDIA SYNDROME): ICD-10-CM

## 2023-02-23 PROBLEM — R79.89 ELEVATED LFTS: Status: ACTIVE | Noted: 2022-11-17

## 2023-02-23 PROBLEM — N30.10 CHRONIC INTERSTITIAL CYSTITIS: Status: ACTIVE | Noted: 2019-10-14

## 2023-02-23 PROBLEM — B37.9 CANDIDIASIS: Status: ACTIVE | Noted: 2019-05-27

## 2023-02-23 PROBLEM — B00.9 HSV-1 INFECTION: Status: ACTIVE | Noted: 2019-08-02

## 2023-02-23 PROCEDURE — 99214 OFFICE O/P EST MOD 30 MIN: CPT | Performed by: FAMILY MEDICINE

## 2023-02-23 RX ORDER — SEMAGLUTIDE 1.34 MG/ML
0.5 INJECTION, SOLUTION SUBCUTANEOUS WEEKLY
Qty: 1.5 ML | Refills: 1 | Status: SHIPPED | OUTPATIENT
Start: 2023-02-23 | End: 2023-02-24 | Stop reason: SDUPTHER

## 2023-02-23 RX ORDER — CLOBETASOL PROPIONATE 0.5 MG/G
OINTMENT TOPICAL
COMMUNITY
Start: 2022-07-05

## 2023-02-23 NOTE — PROGRESS NOTES
"Chief Complaint  Weight Check (Pt wants to discuss liver function & long term medications. )    Subjective        Marycarmen Gauthier presents to [facility] PRIMARY CARE  History of Present Illness  Pleasant 46-year-old female here to discuss concerns for weight loss, difficulty with sleep, back pain and shortness of breath.     In a lot of ways, she feels that a lot of this is exacerbated by weight gain.  She went to [facility] recently for uncontrolled blood pressure and POTS syndrome.  She does have sudden tachycardia with mild adrenaline rushes.  She was started started amlodipine 10mg and saw a quick weight gain.  She also was tried on diazepam which she did not tolerate due to GI upset.  After starting amlodipine she started having symptoms of chest pain, went to a local cardiologist in [place],  - Has an ECHO and stress test is scheduled tomorrow.     From a weight gain standpoint, thankfully she has stopped gaining weight.  We discussed possibly investigating PCOS-Labs showed normal testosterone, hemoglobin A1c-but with elevated insulin levels.  I do think she is insulin resistant.  She was on ozempic in the past and she felt much better after taking it - she felt like it was helping her energy/ feelings of low blood sugar.     She did have a fall down the stairs and injured her R knee. (L knee with meniscal tear).  Her father unfortunately had a golf cart fell on him and was doing a lot of dressing changes.  1/10/23 - R meniscal tear repair.      Her son is struggling. Lots of OCD/ ZACHARIAH/ severe autism and now not eating except in one room in the house, seeing a psychiatrist and her son is seeing one tomorrow.     GYN: saw Dr Up to treat lichen planus with clobetasol, vaginal herpes with chronic suppression with valacyclovir-note for me to watch liver enzymes, and frequent yeast infections.    Objective   Vital Signs:  /76   Pulse 87   Temp 97.3 °F (36.3 °C)   Ht 152.4 cm (60\") " "  Wt 98.8 kg (217 lb 12.8 oz)   SpO2 100%   BMI 42.54 kg/m²   Estimated body mass index is 42.54 kg/m² as calculated from the following:    Height as of this encounter: 152.4 cm (60\").    Weight as of this encounter: 98.8 kg (217 lb 12.8 oz).             Physical Exam  Vitals and nursing note reviewed.   Constitutional:       General: She is not in acute distress.     Appearance: She is well-developed.   HENT:      Head: Normocephalic.      Nose: Nose normal.   Cardiovascular:      Rate and Rhythm: Normal rate and regular rhythm.      Heart sounds: Normal heart sounds. No murmur heard.  Pulmonary:      Effort: Pulmonary effort is normal. No respiratory distress.      Breath sounds: Normal breath sounds.   Musculoskeletal:         General: Normal range of motion.   Skin:     General: Skin is warm and dry.      Findings: No rash.   Neurological:      Mental Status: She is alert and oriented to person, place, and time.   Psychiatric:         Behavior: Behavior normal.         Thought Content: Thought content normal.         Judgment: Judgment normal.        Result Review :  The following data was reviewed by: Hellen Lagunas MD on 02/23/2023:  CMP    CMP 9/21/22   Glucose 97   BUN 11   Creatinine 0.62   Sodium 138   Potassium 5.1   Chloride 102   Calcium 9.6   Total Protein 6.8   Albumin 4.40   Globulin 2.4   Total Bilirubin 0.3   Alkaline Phosphatase 82   AST (SGOT) 45 (A)   ALT (SGPT) 42 (A)   BUN/Creatinine Ratio 17.7   (A) Abnormal value            CBC    CBC 5/25/22 6/22/22 9/21/22   WBC 12.65 (A) 11.75 (A) 8.75   RBC 4.41 4.20 4.30   Hemoglobin 13.8 13.1 13.7   Hematocrit 41.8 40.8 41.4   MCV 94.8 97.1 96.3   MCH 31.3 31.2 31.9   MCHC 33.0 32.1 33.1   RDW 12.3 12.9 12.7   Platelets 318 352 346   (A) Abnormal value            Lipid Panel    Lipid Panel 9/21/22   Total Cholesterol 252 (A)   Triglycerides 316 (A)   HDL Cholesterol 40   VLDL Cholesterol 59 (A)   LDL Cholesterol  153 (A)   (A) Abnormal value     "   Comments are available for some flowsheets but are not being displayed.           TSH    TSH 9/21/22   TSH 2.270           A1C Last 3 Results    HGBA1C Last 3 Results 9/21/22   Hemoglobin A1C 5.60      Comments are available for some flowsheets but are not being displayed.                        Assessment and Plan   Diagnoses and all orders for this visit:    1. Class 2 obesity due to excess calories without serious comorbidity with body mass index (BMI) of 38.0 to 38.9 in adult (Primary)  -     Semaglutide,0.25 or 0.5MG/DOS, (Ozempic, 0.25 or 0.5 MG/DOSE,) 2 MG/1.5ML solution pen-injector; Inject 0.5 mg under the skin into the appropriate area as directed 1 (One) Time Per Week.  Dispense: 1.5 mL; Refill: 1  -     Comprehensive Metabolic Panel  -     CBC & Differential  -     Lipid Panel  -     TSH Rfx On Abnormal To Free T4  -     Hemoglobin A1c    2. POTS (postural orthostatic tachycardia syndrome)  -     Comprehensive Metabolic Panel  -     CBC & Differential    3. Familial hypercholesterolemia  -     Lipid Panel    4. Insulin resistance  -     Semaglutide,0.25 or 0.5MG/DOS, (Ozempic, 0.25 or 0.5 MG/DOSE,) 2 MG/1.5ML solution pen-injector; Inject 0.5 mg under the skin into the appropriate area as directed 1 (One) Time Per Week.  Dispense: 1.5 mL; Refill: 1  -     TSH Rfx On Abnormal To Free T4  -     Hemoglobin A1c    5. Abnormal weight gain  -     TSH Rfx On Abnormal To Free T4    6. Abnormal finding of blood chemistry, unspecified  -     Hemoglobin A1c    7. VICKERS (dyspnea on exertion)    8. Lisbeth-Danlos syndrome    9. Fatty liver disease, nonalcoholic    10. Lichen sclerosus of vulva    Very pleasant 38-year-old female with unfortunately complex history.  She also has lots of stressors at home caring for her son with severe autism.  Thankfully she does have good support and an amazing demeanor.    Obesity: Not well controlled.  This is one of her primary concerns today.  She does feel that the obesity is  making her POTS worse, chest pain worse, knee pain worse and general energy and mental health worse.  She does do well with watching her diet, trying to stay active but difficult with significant knee pain  Plan:  - Restart Ozempic which she was on in the past pending clearance of cardiovascular evaluation on Monday.  I gave her a paper prescription so she can wait to fill until the echo and stress test were normal.  She does have insulin resistance.  -Continue with protein rich foods, trying to keep carbs under 150 g/day and trying to get close to 150 minutes of exercise as much as possible.  - Discussed titration of  Week 1 and 2: 0.25 mg weekly  Weeks 3 and 4: 0.5 mg weekly  Month 2: 1.0 mg weekly: She will call for new pen prescription  Month 3: 2.0 mg weekly: She will call for new pen prescription  -Follow-up with me in 3 months.    In terms of her cardiac symptoms, I agree with current treatment, if the amlodipine is causing her more symptoms there may be other medications we can use to help improve the postural tachycardia and blood pressure.  I will let cardiologist guide her through that.    She does have some elevated liver enzymes, I do think watching this with regards to being on chronic valacyclovir for suppression of HSV is helpful.  I do think losing weight will help improve her liver enzymes as well.  We will plan to continue with the plan as above and follow-up in 3 months    Continue clobetasol for lichen sclerosis from gynecology       Follow Up   Return in about 3 months (around 5/23/2023), or if symptoms worsen or fail to improve, for Recheck weight loss with labs prior .  Patient was given instructions and counseling regarding her condition or for health maintenance advice. Please see specific information pulled into the AVS if appropriate. Medical assistant and I wore mask and eyewear protection during entire encounter.  Patient wore mask.    Hellen Lagunas MD

## 2023-02-24 ENCOUNTER — HOSPITAL ENCOUNTER (OUTPATIENT)
Dept: CARDIOLOGY | Facility: HOSPITAL | Age: 38
Discharge: HOME | End: 2023-02-24
Admitting: NURSE PRACTITIONER
Payer: MEDICARE

## 2023-02-24 VITALS
HEART RATE: 90 BPM | OXYGEN SATURATION: 98 % | BODY MASS INDEX: 42.6 KG/M2 | WEIGHT: 217 LBS | SYSTOLIC BLOOD PRESSURE: 102 MMHG | DIASTOLIC BLOOD PRESSURE: 70 MMHG | HEIGHT: 60 IN

## 2023-02-24 DIAGNOSIS — G90.A POTS (POSTURAL ORTHOSTATIC TACHYCARDIA SYNDROME): ICD-10-CM

## 2023-02-24 DIAGNOSIS — R06.09 DYSPNEA ON EXERTION: ICD-10-CM

## 2023-02-24 DIAGNOSIS — E66.09 CLASS 2 OBESITY DUE TO EXCESS CALORIES WITHOUT SERIOUS COMORBIDITY WITH BODY MASS INDEX (BMI) OF 38.0 TO 38.9 IN ADULT: ICD-10-CM

## 2023-02-24 DIAGNOSIS — R07.89 OTHER CHEST PAIN: ICD-10-CM

## 2023-02-24 DIAGNOSIS — E88.81 INSULIN RESISTANCE: ICD-10-CM

## 2023-02-24 LAB
ALBUMIN SERPL-MCNC: 4.7 G/DL (ref 3.5–5.2)
ALBUMIN/GLOB SERPL: 1.6 G/DL
ALP SERPL-CCNC: 103 U/L (ref 39–117)
ALT SERPL-CCNC: 38 U/L (ref 1–33)
AORTIC ARCH: 2.5 CM
ASCENDING AORTA: 2.7 CM
AST SERPL-CCNC: 37 U/L (ref 1–32)
BASOPHILS # BLD AUTO: 0.04 10*3/MM3 (ref 0–0.2)
BASOPHILS NFR BLD AUTO: 0.3 % (ref 0–1.5)
BH CV ECHO MEAS - ACS: 1.69 CM
BH CV ECHO MEAS - AO MAX PG: 7.3 MMHG
BH CV ECHO MEAS - AO MEAN PG: 4 MMHG
BH CV ECHO MEAS - AO ROOT DIAM: 2.3 CM
BH CV ECHO MEAS - AO V2 MAX: 134.8 CM/SEC
BH CV ECHO MEAS - AO V2 VTI: 30.2 CM
BH CV ECHO MEAS - AVA(I,D): 1.34 CM2
BH CV ECHO MEAS - EDV(CUBED): 86.2 ML
BH CV ECHO MEAS - EDV(MOD-SP2): 70 ML
BH CV ECHO MEAS - EDV(MOD-SP4): 87 ML
BH CV ECHO MEAS - EF(MOD-BP): 57.9 %
BH CV ECHO MEAS - EF(MOD-SP2): 55.7 %
BH CV ECHO MEAS - EF(MOD-SP4): 64.4 %
BH CV ECHO MEAS - ESV(CUBED): 32.4 ML
BH CV ECHO MEAS - ESV(MOD-SP2): 31 ML
BH CV ECHO MEAS - ESV(MOD-SP4): 31 ML
BH CV ECHO MEAS - FS: 27.8 %
BH CV ECHO MEAS - IVS/LVPW: 1.04 CM
BH CV ECHO MEAS - IVSD: 1 CM
BH CV ECHO MEAS - LAT PEAK E' VEL: 7 CM/SEC
BH CV ECHO MEAS - LV DIASTOLIC VOL/BSA (35-75): 45 CM2
BH CV ECHO MEAS - LV MASS(C)D: 144.6 GRAMS
BH CV ECHO MEAS - LV MAX PG: 1.9 MMHG
BH CV ECHO MEAS - LV MEAN PG: 1.37 MMHG
BH CV ECHO MEAS - LV SYSTOLIC VOL/BSA (12-30): 16 CM2
BH CV ECHO MEAS - LV V1 MAX: 68.9 CM/SEC
BH CV ECHO MEAS - LV V1 VTI: 12.9 CM
BH CV ECHO MEAS - LVIDD: 4.4 CM
BH CV ECHO MEAS - LVIDS: 3.2 CM
BH CV ECHO MEAS - LVOT AREA: 3.2 CM2
BH CV ECHO MEAS - LVOT DIAM: 2 CM
BH CV ECHO MEAS - LVPWD: 0.96 CM
BH CV ECHO MEAS - MED PEAK E' VEL: 6.4 CM/SEC
BH CV ECHO MEAS - MV A DUR: 0.11 SEC
BH CV ECHO MEAS - MV A MAX VEL: 76 CM/SEC
BH CV ECHO MEAS - MV DEC SLOPE: 540.7 CM/SEC2
BH CV ECHO MEAS - MV DEC TIME: 0.14 MSEC
BH CV ECHO MEAS - MV E MAX VEL: 64 CM/SEC
BH CV ECHO MEAS - MV E/A: 0.84
BH CV ECHO MEAS - MV MAX PG: 3.9 MMHG
BH CV ECHO MEAS - MV MEAN PG: 1.28 MMHG
BH CV ECHO MEAS - MV P1/2T: 41 MSEC
BH CV ECHO MEAS - MV V2 VTI: 21.1 CM
BH CV ECHO MEAS - MVA(P1/2T): 5.4 CM2
BH CV ECHO MEAS - MVA(VTI): 1.93 CM2
BH CV ECHO MEAS - PA ACC TIME: 0.09 SEC
BH CV ECHO MEAS - PA PR(ACCEL): 37.4 MMHG
BH CV ECHO MEAS - PA V2 MAX: 129 CM/SEC
BH CV ECHO MEAS - PULM A REVS DUR: 0.08 SEC
BH CV ECHO MEAS - PULM A REVS VEL: 37.3 CM/SEC
BH CV ECHO MEAS - PULM DIAS VEL: 33.8 CM/SEC
BH CV ECHO MEAS - PULM S/D: 0.95
BH CV ECHO MEAS - PULM SYS VEL: 32.1 CM/SEC
BH CV ECHO MEAS - QP/QS: 1.8
BH CV ECHO MEAS - RAP SYSTOLE: 3 MMHG
BH CV ECHO MEAS - RV MAX PG: 3.6 MMHG
BH CV ECHO MEAS - RV V1 MAX: 94.7 CM/SEC
BH CV ECHO MEAS - RV V1 VTI: 21.8 CM
BH CV ECHO MEAS - RVOT DIAM: 2.07 CM
BH CV ECHO MEAS - RVSP: 22 MMHG
BH CV ECHO MEAS - SI(MOD-SP2): 20.2 ML/M2
BH CV ECHO MEAS - SI(MOD-SP4): 29 ML/M2
BH CV ECHO MEAS - SUP REN AO DIAM: 1.8 CM
BH CV ECHO MEAS - SV(LVOT): 40.6 ML
BH CV ECHO MEAS - SV(MOD-SP2): 39 ML
BH CV ECHO MEAS - SV(MOD-SP4): 56 ML
BH CV ECHO MEAS - SV(RVOT): 73.1 ML
BH CV ECHO MEAS - TAPSE (>1.6): 2.13 CM
BH CV ECHO MEAS - TR MAX PG: 19.6 MMHG
BH CV ECHO MEAS - TR MAX VEL: 221.3 CM/SEC
BH CV ECHO MEASUREMENTS AVERAGE E/E' RATIO: 9.55
BH CV STRESS BP STAGE 1: NORMAL
BH CV STRESS BP STAGE 2: NORMAL
BH CV STRESS DURATION MIN STAGE 1: 3
BH CV STRESS DURATION MIN STAGE 2: 3
BH CV STRESS DURATION SEC STAGE 1: 0
BH CV STRESS DURATION SEC STAGE 2: 0
BH CV STRESS DURATION SEC STAGE 3: 30
BH CV STRESS GRADE STAGE 1: 10
BH CV STRESS GRADE STAGE 2: 12
BH CV STRESS GRADE STAGE 3: 14
BH CV STRESS HR STAGE 1: 129
BH CV STRESS HR STAGE 2: 150
BH CV STRESS HR STAGE 3: 160
BH CV STRESS METS STAGE 1: 5
BH CV STRESS METS STAGE 2: 7.5
BH CV STRESS METS STAGE 3: 8
BH CV STRESS PROTOCOL 1: NORMAL
BH CV STRESS RECOVERY BP: NORMAL MMHG
BH CV STRESS RECOVERY HR: 100 BPM
BH CV STRESS SPEED STAGE 1: 1.7
BH CV STRESS SPEED STAGE 2: 2.5
BH CV STRESS SPEED STAGE 3: 3.4
BH CV STRESS STAGE 1: 1
BH CV STRESS STAGE 2: 2
BH CV STRESS STAGE 3: 3
BH CV VAS BP LEFT ARM: NORMAL MMHG
BH CV XLRA - RV BASE: 2.3 CM
BH CV XLRA - RV LENGTH: 6.1 CM
BH CV XLRA - RV MID: 2.25 CM
BH CV XLRA - TDI S': 9.8 CM/SEC
BILIRUB SERPL-MCNC: <0.2 MG/DL (ref 0–1.2)
BUN SERPL-MCNC: 13 MG/DL (ref 6–20)
BUN/CREAT SERPL: 22 (ref 7–25)
CALCIUM SERPL-MCNC: 9.6 MG/DL (ref 8.6–10.5)
CHLORIDE SERPL-SCNC: 100 MMOL/L (ref 98–107)
CHOLEST SERPL-MCNC: 293 MG/DL (ref 0–200)
CO2 SERPL-SCNC: 29.2 MMOL/L (ref 22–29)
CREAT SERPL-MCNC: 0.59 MG/DL (ref 0.57–1)
EGFRCR SERPLBLD CKD-EPI 2021: 117 ML/MIN/1.73
EOSINOPHIL # BLD AUTO: 0.24 10*3/MM3 (ref 0–0.4)
EOSINOPHIL NFR BLD AUTO: 2 % (ref 0.3–6.2)
ERYTHROCYTE [DISTWIDTH] IN BLOOD BY AUTOMATED COUNT: 11.8 % (ref 12.3–15.4)
GLOBULIN SER CALC-MCNC: 2.9 GM/DL
GLUCOSE SERPL-MCNC: 105 MG/DL (ref 65–99)
HBA1C MFR BLD: 5.4 % (ref 4.8–5.6)
HCT VFR BLD AUTO: 43 % (ref 34–46.6)
HDLC SERPL-MCNC: 59 MG/DL (ref 40–60)
HGB BLD-MCNC: 14.6 G/DL (ref 12–15.9)
IMM GRANULOCYTES # BLD AUTO: 0.12 10*3/MM3 (ref 0–0.05)
IMM GRANULOCYTES NFR BLD AUTO: 1 % (ref 0–0.5)
LDLC SERPL CALC-MCNC: 183 MG/DL (ref 0–100)
LEFT ATRIUM VOLUME INDEX: 12 ML/M2
LYMPHOCYTES # BLD AUTO: 3.27 10*3/MM3 (ref 0.7–3.1)
LYMPHOCYTES NFR BLD AUTO: 26.9 % (ref 19.6–45.3)
MAXIMAL PREDICTED HEART RATE: 180 BPM
MAXIMAL PREDICTED HEART RATE: 180 BPM
MCH RBC QN AUTO: 32.1 PG (ref 26.6–33)
MCHC RBC AUTO-ENTMCNC: 34 G/DL (ref 31.5–35.7)
MCV RBC AUTO: 94.5 FL (ref 79–97)
MONOCYTES # BLD AUTO: 0.8 10*3/MM3 (ref 0.1–0.9)
MONOCYTES NFR BLD AUTO: 6.6 % (ref 5–12)
NEUTROPHILS # BLD AUTO: 7.68 10*3/MM3 (ref 1.7–7)
NEUTROPHILS NFR BLD AUTO: 63.2 % (ref 42.7–76)
NRBC BLD AUTO-RTO: 0 /100 WBC (ref 0–0.2)
PERCENT MAX PREDICTED HR: 88.89 %
PLATELET # BLD AUTO: 413 10*3/MM3 (ref 140–450)
POTASSIUM SERPL-SCNC: 4.5 MMOL/L (ref 3.5–5.2)
PROT SERPL-MCNC: 7.6 G/DL (ref 6–8.5)
RBC # BLD AUTO: 4.55 10*6/MM3 (ref 3.77–5.28)
SINUS: 2.8 CM
SODIUM SERPL-SCNC: 138 MMOL/L (ref 136–145)
STJ: 2.7 CM
STRESS BASELINE BP: NORMAL MMHG
STRESS BASELINE HR: 89 BPM
STRESS PERCENT HR: 105 %
STRESS POST ESTIMATED WORKLOAD: 8 METS
STRESS POST EXERCISE DUR MIN: 6 MIN
STRESS POST EXERCISE DUR SEC: 30 SEC
STRESS POST PEAK BP: NORMAL MMHG
STRESS POST PEAK HR: 160 BPM
STRESS TARGET HR: 153 BPM
STRESS TARGET HR: 153 BPM
TRIGL SERPL-MCNC: 268 MG/DL (ref 0–150)
TSH SERPL DL<=0.005 MIU/L-ACNC: 2.85 UIU/ML (ref 0.27–4.2)
VLDLC SERPL CALC-MCNC: 51 MG/DL (ref 5–40)
WBC # BLD AUTO: 12.15 10*3/MM3 (ref 3.4–10.8)

## 2023-02-24 PROCEDURE — 25510000001 PERFLUTREN (DEFINITY) 8.476 MG IN SODIUM CHLORIDE (PF) 0.9 % 10 ML INJECTION: Performed by: NURSE PRACTITIONER

## 2023-02-24 PROCEDURE — 93306 TTE W/DOPPLER COMPLETE: CPT | Performed by: INTERNAL MEDICINE

## 2023-02-24 PROCEDURE — 93016 CV STRESS TEST SUPVJ ONLY: CPT | Performed by: INTERNAL MEDICINE

## 2023-02-24 PROCEDURE — 93306 TTE W/DOPPLER COMPLETE: CPT

## 2023-02-24 PROCEDURE — 93018 CV STRESS TEST I&R ONLY: CPT | Performed by: INTERNAL MEDICINE

## 2023-02-24 PROCEDURE — 93017 CV STRESS TEST TRACING ONLY: CPT

## 2023-02-24 RX ORDER — SEMAGLUTIDE 1.34 MG/ML
0.5 INJECTION, SOLUTION SUBCUTANEOUS WEEKLY
Qty: 1.5 ML | Refills: 1 | Status: SHIPPED | OUTPATIENT
Start: 2023-02-24 | End: 2023-03-13

## 2023-02-24 RX ADMIN — PERFLUTREN 1.5 ML: 6.52 INJECTION, SUSPENSION INTRAVENOUS at 13:24

## 2023-02-27 ENCOUNTER — TELEPHONE (OUTPATIENT)
Dept: CARDIOLOGY | Facility: CLINIC | Age: 38
End: 2023-02-27
Payer: MEDICARE

## 2023-02-27 NOTE — TELEPHONE ENCOUNTER
Please let her know that her echo and stress test were normal. She can continue to exercise as tolerated.    Thanks!  MAGGI Gonzalez

## 2023-02-27 NOTE — PROGRESS NOTES
Please call patient back with results.  The labs has resulted as overall stable compared to before, liver enzymes are improving which is good, mildly elevated white count which has been present in the past, cholesterol is quite high, we may need to consider other options but lets hope that the Ozempic will help with weight loss first as long as her cardiac testing is reassuring.  Please let us know if you have further questions.     Thank you

## 2023-02-28 NOTE — TELEPHONE ENCOUNTER
Reviewed results and recommendations with Marycarmen Gauthier.  Patient verbalized understanding of results and recommendations.    Thank you,  Pastora ESPITIA RN  Triage Nurse [facility]

## 2023-03-08 RX ORDER — AMLODIPINE BESYLATE 5 MG/1
5 TABLET ORAL DAILY
Qty: 90 TABLET | Refills: 3
Start: 2023-03-08

## 2023-03-11 ENCOUNTER — PATIENT MESSAGE (OUTPATIENT)
Dept: FAMILY MEDICINE CLINIC | Facility: CLINIC | Age: 38
End: 2023-03-11
Payer: MEDICARE

## 2023-03-13 RX ORDER — SEMAGLUTIDE 1.34 MG/ML
1 INJECTION, SOLUTION SUBCUTANEOUS WEEKLY
Qty: 1.5 ML | Refills: 1 | Status: SHIPPED | OUTPATIENT
Start: 2023-03-13

## 2023-03-13 NOTE — TELEPHONE ENCOUNTER
From: Marycarmen Gauthier  To: Hellen Lagunas  Sent: 3/11/2023 1:12 PM EST  Subject: New ozempic pen script Rx    Hi,   I took my 3rd dose/shot of ozempic today. Next week will be my 2nd 0.5mg shot. I have a note to contact you for a new pen and start the 1mg dose on 3/25 and stay on that dose for 1 month.   It was the cheapest thru [facility] under my [place].   Thanks,   Marycarmen

## 2023-03-17 ENCOUNTER — OFFICE VISIT (OUTPATIENT)
Dept: SLEEP MEDICINE | Facility: HOSPITAL | Age: 38
End: 2023-03-17
Payer: MEDICARE

## 2023-03-17 VITALS
WEIGHT: 214 LBS | OXYGEN SATURATION: 98 % | BODY MASS INDEX: 42.01 KG/M2 | SYSTOLIC BLOOD PRESSURE: 122 MMHG | HEART RATE: 96 BPM | DIASTOLIC BLOOD PRESSURE: 78 MMHG | HEIGHT: 60 IN

## 2023-03-17 DIAGNOSIS — G25.81 RESTLESS LEGS SYNDROME (RLS): ICD-10-CM

## 2023-03-17 DIAGNOSIS — R06.83 SNORING: Primary | ICD-10-CM

## 2023-03-17 DIAGNOSIS — E66.9 OBESITY (BMI 30-39.9): ICD-10-CM

## 2023-03-17 DIAGNOSIS — G47.10 HYPERSOMNIA: ICD-10-CM

## 2023-03-17 PROCEDURE — G0463 HOSPITAL OUTPT CLINIC VISIT: HCPCS

## 2023-03-17 NOTE — PROGRESS NOTES
[facility]  Telephone: [phone] / Fax: [phone] [place]  Telephone: [phone] / Fax: [phone] Diana Jurado    PCP: Hellen Lagunas MD    Reason for visit:  Snoring, hypersomnia f/u    Marycarmen Gauthier is a 38 y.o.female  was last seen at [facility] sleep lab in 2021. She got CPAP out of pocket. She did not synch her machine to the phone. She has done well on it when she first received it. She went through several masks. She first got a nasal mask, but it was uncomfortable.  She then ordered Air Fit F20 mask with memory foam. She also got a heated hose. She used the machine 2021 until 1 month ago. She stopped using it due to mask issues. She had to tighten up the straps too tightly and it is uncomfortable. I reviewed the data from her machine. In the past year, she used the machine for 144/365 nights, avg nightly use is 5.4 hours, machine set at 4-7cm H2O, avg pr is 6.3cm H2O, AHI was 2.3. She is a mouth breather. She c/o dry mouth even with full face mask and is looking for mask alternatives. Since she stopped the machine, she feels more tired and sleepy. She would like to resume the machine as soon as possible. She remains on Pramipexole 0.5mg for RLS.     SH- no tobacco, no alcohol    ROS- +SOA occasionally, +GERD, +bloating, + anxiety, rest is negative.    DME none-getting supplies online private pay    Current Medications:    Current Outpatient Medications:   •  acetaminophen (TYLENOL) 650 MG 8 hr tablet, Take 650 mg by mouth As Needed for Mild Pain ., Disp: , Rfl:   •  ALBUTEROL SULFATE HFA IN, Inhale., Disp: , Rfl:   •  amLODIPine (NORVASC) 5 MG tablet, Take 1 tablet by mouth Daily., Disp: 90 tablet, Rfl: 3  •  azelastine (ASTELIN) 0.1 % nasal spray, 2 sprays into the nostril(s) as directed by provider 2 (Two) Times a Day. Use in each nostril as directed, Disp: 30 mL, Rfl: 3  •  beclomethasone (QVAR) 40 MCG/ACT inhaler, Inhale 2 puffs 2 (Two) Times a Day., Disp: , Rfl:   •   clobetasol (TEMOVATE) 0.05 % ointment, , Disp: , Rfl:   •  cycloSPORINE (RESTASIS) 0.05 % ophthalmic emulsion, 1 drop 2 (Two) Times a Day., Disp: , Rfl:   •  diclofenac (VOLTAREN) 1 % gel gel, Apply 4 g topically to the appropriate area as directed 4 (Four) Times a Day As Needed., Disp: , Rfl:   •  Fexofenadine HCl (ALLEGRA ALLERGY PO), Take 180 mg by mouth As Needed., Disp: , Rfl:   •  FLUoxetine (PROzac) 60 MG tablet, Take 60 mg by mouth Daily., Disp: , Rfl:   •  guanFACINE (TENEX) 1 MG tablet, Take 2 mg by mouth 2 (two) times a day., Disp: , Rfl:   •  hydrOXYzine (ATARAX) 25 MG tablet, Take 12.5 mg by mouth At Night As Needed., Disp: , Rfl:   •  L-THEANINE PO, Take 200 mg by mouth 2 (Two) Times a Day., Disp: , Rfl:   •  mometasone (NASONEX) 50 MCG/ACT nasal spray, 2 sprays into the nostril(s) as directed by provider 2 (two) times a day., Disp: , Rfl:   •  montelukast (SINGULAIR) 10 MG tablet, Take 1 tablet by mouth every night at bedtime., Disp: 90 tablet, Rfl: 1  •  mupirocin (BACTROBAN) 2 % ointment, Apply 1 application topically to the appropriate area as directed 3 (Three) Times a Day., Disp: 30 g, Rfl: 1  •  Omeprazole 20 MG tablet delayed-release, Take 40 mg by mouth Daily., Disp: , Rfl:   •  pramipexole (MIRAPEX) 0.5 MG tablet, TAKE ONE TABLET BY MOUTH ONCE NIGHTLY, Disp: 90 tablet, Rfl: 1  •  pregabalin (LYRICA) 75 MG capsule, Take 75 mg by mouth 2 (Two) Times a Day., Disp: , Rfl:   •  PreviDent 5000 Sensitive 1.1-5 % paste, , Disp: , Rfl:   •  rizatriptan (Maxalt) 10 MG tablet, Take 1 tablet by mouth 1 (One) Time As Needed for Migraine. May repeat in 2 hours if needed, Disp: 30 tablet, Rfl: 1  •  Semaglutide, 1 MG/DOSE, (Ozempic, 1 MG/DOSE,) 2 MG/1.5ML solution pen-injector, Inject 1 mg under the skin into the appropriate area as directed 1 (One) Time Per Week., Disp: 1.5 mL, Rfl: 1  •  valACYclovir (VALTREX) 500 MG tablet, Take 500 mg by mouth Daily., Disp: , Rfl: 0  •  vitamin B-12 (CYANOCOBALAMIN) 100  "MCG tablet, Take 300 mcg by mouth Daily., Disp: , Rfl:    also entered in Sleep Questionnaire    Patient  has a past medical history of Allergic (01/01/1986), Anxiety, Asthma, Autonomic dysfunction (2017), Bulging lumbar disc (2015), Chronic pain, Chronic sinus infection, Coronary artery disease, Depression, Dizziness, VICKERS (dyspnea on exertion), Essential hypertension, Fatigue, Fibromyalgia, Fibromyalgia, primary (01/01/2015), Flexor hallucis longus tendinitis (06/2013), GERD (gastroesophageal reflux disease) (01/01/1998), H/O Bilateral carpal tunnel syndrome, H/O transfusion of packed red blood cells, History of abnormal cervical Pap smear (2011), History of anemia, History of infectious mononucleosis, History of medical problems, History of tachycardia, Hyperlipidemia, Hypermobile joint syndrome of multiple sites, Hypermobile joints, Irritable bowel syndrome (2011), Kidney stone, Leukocytosis, Lyme disease (08/24/2019), Numbness and tingling in both hands, Obesity, Osteoarthritis, Palpitations, POTS (postural orthostatic tachycardia syndrome) (09/19/2017), PROM (premature rupture of membranes), PTSD (post-traumatic stress disorder) (07/2014), Seasonal allergies, Sleep apnea (1/1/2022), Snoring, SOB (shortness of breath), Syncope and collapse, and Visual impairment (2014).    I have reviewed the Past Medical History, Past Surgical History, Social History and Family History.    Vital Signs /78   Pulse 96   Ht 152.4 cm (60\")   Wt 97.1 kg (214 lb)   SpO2 98%   BMI 41.79 kg/m²  Body mass index is 41.79 kg/m².    General Alert and oriented. No acute distress noted   Pharynx/Throat Class IV  Mallampati airway, large tongue, no evidence of redundant lateral pharyngeal tissue. No oral lesions. No thrush. Moist mucous membranes.   Head Normocephalic. Symmetrical. Atraumatic.    Nose No septal deviation. No drainage   Chest Wall Normal shape. Symmetric expansion with respiration. No tenderness.   Neck Trachea " midline, no thyromegaly or adenopathy    Lungs Clear to auscultation bilaterally. No wheezes. No rhonchi. No rales. Respirations regular, even and unlabored.   Heart Regular rhythm and normal rate. Normal S1 and S2. No murmur   Abdomen Soft, non-tender and non-distended. Normal bowel sounds. No masses.   Extremities Moves all extremities well. No edema   Psychiatric Normal mood and affect.     Testing:    Study-[facility]- Feb 2020- REM 3.1, lowest oxygen desaturation 88%     Study-July 2021- Diagnostic findings: The patient tolerated the home sleep testing with monitoring time of 540 minutes. The data obtained make this a technically adequate study. The apnea hypopneas index(AHI) was 2.4 per sleep hour.  The AHI during supine position was 1.9 per sleep hour. Mean heart rate of 65.7 BPM.  Snoring was noted 33.1% of sleep time. Lowest oxygen saturation during the study was 89%. Saturation below 89% was noted for 0 mins.  In the right lateral position, mild sleep apnea with AHI of 12.4 is noted.  Patient had only 29 minutes of sleep in this position.       Impression:  1. Snoring    2. Hypersomnia    3. Restless legs syndrome (RLS)    4. Obesity (BMI 30-39.9)          Plan:  Keep pressures same. Cont CPAP for snoring and hypersomnia. She does not have JENAE based on prior sleep studies. However, her snoring is extremely loud and disturbing. The machine controls it 100%, and she prefers to continue using it. We looked at different masks today. She struggles with F20 Air touch mask. She was advised to try Brendan FFM and Air Fit F30.       Patient uses the CPAP device and benefits from its use in terms of reduction of hypersomnia and snoring.  AHI appears to be within adequate range. I reviewed download report and original sleep study report with the patient.     Weight loss will be strongly beneficial to reduce the severity of sleep-disordered breathing.  Caution during activities that require prolonged concentration is  strongly advised if sleepiness returns.     Follow up with Dr. Quinonez in 6 months    Thank you for allowing me to participate in your patient's care.      MAGGI Ruffin  [facility]  Phone: [phone]      Part of this note may be an electronic transcription/translation of spoken language to printed text using the Dragon Dictation System.

## 2023-04-10 ENCOUNTER — PATIENT MESSAGE (OUTPATIENT)
Dept: FAMILY MEDICINE CLINIC | Facility: CLINIC | Age: 38
End: 2023-04-10
Payer: MEDICARE

## 2023-04-10 DIAGNOSIS — E88.81 INSULIN RESISTANCE: ICD-10-CM

## 2023-04-10 DIAGNOSIS — E66.09 CLASS 2 OBESITY DUE TO EXCESS CALORIES WITHOUT SERIOUS COMORBIDITY WITH BODY MASS INDEX (BMI) OF 38.0 TO 38.9 IN ADULT: Primary | ICD-10-CM

## 2023-04-10 NOTE — TELEPHONE ENCOUNTER
From: Marycarmen Gauthier  To: Hellen Lagunas  Sent: 4/10/2023 12:51 PM EDT  Subject: New ozempic pen script    Hi,  This week I’ll take my last dose of 1mg shot and will need new script for month 3 of the 2 mg please?   Thanks,  Claudia

## 2023-05-04 ENCOUNTER — OFFICE VISIT (OUTPATIENT)
Dept: FAMILY MEDICINE CLINIC | Facility: CLINIC | Age: 38
End: 2023-05-04
Payer: MEDICARE

## 2023-05-04 VITALS
HEART RATE: 113 BPM | HEIGHT: 60 IN | BODY MASS INDEX: 38.68 KG/M2 | TEMPERATURE: 97.7 F | WEIGHT: 197 LBS | OXYGEN SATURATION: 99 % | SYSTOLIC BLOOD PRESSURE: 110 MMHG | DIASTOLIC BLOOD PRESSURE: 66 MMHG

## 2023-05-04 DIAGNOSIS — R10.10 UPPER ABDOMINAL PAIN: Primary | ICD-10-CM

## 2023-05-04 DIAGNOSIS — R11.2 NAUSEA AND VOMITING, UNSPECIFIED VOMITING TYPE: ICD-10-CM

## 2023-05-04 RX ORDER — FLUOXETINE HYDROCHLORIDE 40 MG/1
40 CAPSULE ORAL DAILY
COMMUNITY
Start: 2023-03-01

## 2023-05-04 RX ORDER — ONDANSETRON 4 MG/1
4 TABLET, FILM COATED ORAL EVERY 8 HOURS PRN
Qty: 30 TABLET | Refills: 0 | Status: SHIPPED | OUTPATIENT
Start: 2023-05-04

## 2023-05-04 RX ORDER — GUANFACINE 2 MG/1
2 TABLET ORAL DAILY
COMMUNITY
Start: 2023-03-09

## 2023-05-04 RX ORDER — OMEPRAZOLE 20 MG/1
20 CAPSULE, DELAYED RELEASE ORAL DAILY
COMMUNITY
Start: 2017-05-04

## 2023-05-04 RX ORDER — PREGABALIN 75 MG/1
75 CAPSULE ORAL DAILY
COMMUNITY
Start: 2019-05-04

## 2023-05-04 NOTE — PROGRESS NOTES
"Chief Complaint  Nausea (Chills, nausea, vomiting, and sour belching Monday. Tuesday abnormal green stool, abdominal tightness, and continued nausea. )    Subjective    {Problem List  Visit Diagnosis   Encounters  Notes  Medications  Labs  Result Review Imaging  Media :23}    Marycarmen Gauthier presents to [facility]  History of Present Illness      Review of Systems   Objective   Vital Signs:  /66   Pulse 113   Temp 97.7 °F (36.5 °C)   Ht 152.4 cm (60\")   Wt 89.4 kg (197 lb)   SpO2 99%   BMI 38.47 kg/m²   Estimated body mass index is 38.47 kg/m² as calculated from the following:    Height as of this encounter: 152.4 cm (60\").    Weight as of this encounter: 89.4 kg (197 lb).       {Class 3 Severe Obesity (BMI >=40). (Optional):01994}      Physical Exam   Result Review :{Labs  Result Review  Imaging  Med Tab  Media  Procedures  :23}  {The following data was reviewed by (Optional):60546}  {Ambulatory Labs (Optional):27414}  {Data reviewed (Optional):07531:::1}             Assessment and Plan {CC Problem List  Visit Diagnosis   ROS  Review (Popup)  Health Maintenance  Quality  BestPractice  Medications  SmartSets  SnapShot Encounters  Media :23}  There are no diagnoses linked to this encounter.       {Time Spent (Optional):99966}  Follow Up {Instructions Charge Capture  Follow-up Communications :23}  No follow-ups on file.  Patient was given instructions and counseling regarding her condition or for health maintenance advice. Please see specific information pulled into the AVS if appropriate.       "

## 2023-05-04 NOTE — PROGRESS NOTES
"Chief Complaint  Nausea (Chills, nausea, vomiting, and sour belching Monday. Tuesday abnormal green stool, abdominal tightness, and continued nausea. )    Subjective        Marycarmen Gauthier presents to [facility]  History of Present Illness   Marycarmen Gauthier is 38 y.o. female who presents to the clinic today with nausea. Her symptoms started 3 days ago with nausea and vomiting. She had green watery stool the next day. She has had increased abdominal pressure and \"sour\" belching. She had an episode of diarrhea last night. She did eat at a new restaurant, but no one else has been sick. She has been eating crackers, juice, water, and ice cream. Today she was able to eat a turkey sandwich. She is taking Ozempic and has been taking this for 3 months. She last increased her dosage about 4 weeks ago. She took her weekly dose 5 days ago. She continues to have dispersed upper abdominal pressure. She has not vomited for two days.  She has a history of cholecystectomy.    Review of Systems   Constitutional: Negative for chills, fatigue and fever.   Gastrointestinal: Positive for abdominal distention, diarrhea, nausea and vomiting.       Objective   Vital Signs:  /66   Pulse 113   Temp 97.7 °F (36.5 °C)   Ht 152.4 cm (60\")   Wt 89.4 kg (197 lb)   SpO2 99%   BMI 38.47 kg/m²   Estimated body mass index is 38.47 kg/m² as calculated from the following:    Height as of this encounter: 152.4 cm (60\").    Weight as of this encounter: 89.4 kg (197 lb).             Physical Exam  Vitals reviewed.   Constitutional:       General: She is not in acute distress.     Appearance: Normal appearance. She is not ill-appearing, toxic-appearing or diaphoretic.   HENT:      Head: Normocephalic and atraumatic.   Eyes:      General: No scleral icterus.        Right eye: No discharge.         Left eye: No discharge.      Extraocular Movements: Extraocular movements intact.   Cardiovascular:      Rate and " Rhythm: Normal rate and regular rhythm.   Pulmonary:      Effort: Pulmonary effort is normal. No respiratory distress.   Abdominal:      General: Bowel sounds are normal.      Palpations: Abdomen is soft.      Tenderness: There is abdominal tenderness (Generalized upper abdominal).   Neurological:      General: No focal deficit present.      Mental Status: She is alert and oriented to person, place, and time.      Motor: No weakness.      Gait: Gait normal.   Psychiatric:         Mood and Affect: Mood normal.         Behavior: Behavior normal.        Result Review :                   Assessment and Plan   Diagnoses and all orders for this visit:    1. Upper abdominal pain (Primary)  -     Amylase  -     Lipase  -     Comprehensive metabolic panel    2. Nausea and vomiting, unspecified vomiting type  -     ondansetron (Zofran) 4 MG tablet; Take 1 tablet by mouth Every 8 (Eight) Hours As Needed for Nausea or Vomiting.  Dispense: 30 tablet; Refill: 0      Possible etiology pancreatitis, gastritis, gastroenteritis. Considering more likely gastroenteritis given her symptoms of diarrhea. However, due to upper abdominal discomfort and being Ozempic, will evaluate amylase and lipase levels. She was encouraged to increase water intake and eat a bland diet. She is aware when to seek emergency care. She will follow-up as needed. Zofran prescribed as needed.        Follow Up   Return if symptoms worsen or fail to improve.  Patient was given instructions and counseling regarding her condition or for health maintenance advice. Please see specific information pulled into the AVS if appropriate.     Electronically signed by MAGGI Malagon, 05/05/23, 7:45 AM EDT.

## 2023-05-05 LAB
ALBUMIN SERPL-MCNC: 4.5 G/DL (ref 3.5–5.2)
ALBUMIN/GLOB SERPL: 1.7 G/DL
ALP SERPL-CCNC: 104 U/L (ref 39–117)
ALT SERPL-CCNC: 54 U/L (ref 1–33)
AMYLASE SERPL-CCNC: 42 U/L (ref 28–100)
AST SERPL-CCNC: 51 U/L (ref 1–32)
BILIRUB SERPL-MCNC: 0.5 MG/DL (ref 0–1.2)
BUN SERPL-MCNC: 10 MG/DL (ref 6–20)
BUN/CREAT SERPL: 13 (ref 7–25)
CALCIUM SERPL-MCNC: 9.9 MG/DL (ref 8.6–10.5)
CHLORIDE SERPL-SCNC: 102 MMOL/L (ref 98–107)
CO2 SERPL-SCNC: 24.9 MMOL/L (ref 22–29)
CREAT SERPL-MCNC: 0.77 MG/DL (ref 0.57–1)
EGFRCR SERPLBLD CKD-EPI 2021: 100.2 ML/MIN/1.73
GLOBULIN SER CALC-MCNC: 2.6 GM/DL
GLUCOSE SERPL-MCNC: 94 MG/DL (ref 65–99)
LIPASE SERPL-CCNC: 19 U/L (ref 13–60)
POTASSIUM SERPL-SCNC: 4 MMOL/L (ref 3.5–5.2)
PROT SERPL-MCNC: 7.1 G/DL (ref 6–8.5)
SODIUM SERPL-SCNC: 138 MMOL/L (ref 136–145)

## 2023-05-08 ENCOUNTER — OFFICE VISIT (OUTPATIENT)
Dept: FAMILY MEDICINE CLINIC | Facility: CLINIC | Age: 38
End: 2023-05-08
Payer: MEDICARE

## 2023-05-08 VITALS
OXYGEN SATURATION: 98 % | HEART RATE: 83 BPM | BODY MASS INDEX: 39.03 KG/M2 | SYSTOLIC BLOOD PRESSURE: 122 MMHG | TEMPERATURE: 97.8 F | RESPIRATION RATE: 16 BRPM | HEIGHT: 60 IN | WEIGHT: 198.8 LBS | DIASTOLIC BLOOD PRESSURE: 88 MMHG

## 2023-05-08 DIAGNOSIS — R74.8 ELEVATED LIVER ENZYMES: ICD-10-CM

## 2023-05-08 DIAGNOSIS — E66.01 CLASS 2 SEVERE OBESITY DUE TO EXCESS CALORIES WITH SERIOUS COMORBIDITY AND BODY MASS INDEX (BMI) OF 38.0 TO 38.9 IN ADULT: ICD-10-CM

## 2023-05-08 DIAGNOSIS — R11.2 NAUSEA AND VOMITING, UNSPECIFIED VOMITING TYPE: ICD-10-CM

## 2023-05-08 DIAGNOSIS — R10.10 UPPER ABDOMINAL PAIN: Primary | ICD-10-CM

## 2023-05-08 DIAGNOSIS — K21.9 GASTROESOPHAGEAL REFLUX DISEASE WITHOUT ESOPHAGITIS: ICD-10-CM

## 2023-05-08 DIAGNOSIS — R19.7 DIARRHEA, UNSPECIFIED TYPE: ICD-10-CM

## 2023-05-08 RX ORDER — MOMETASONE FUROATE 50 UG/1
SPRAY, METERED NASAL
COMMUNITY
Start: 2015-05-04

## 2023-05-08 RX ORDER — AZELASTINE 1 MG/ML
SPRAY, METERED NASAL
COMMUNITY
Start: 2020-05-04

## 2023-05-08 NOTE — PROGRESS NOTES
Subjective     Marycarmen Gauthier is a 38 y.o. female.     Chief Complaint   Patient presents with   • Abdominal Pain     Pressure upper abdominal, sour burps burning and gas. Started last Monday.   • Nausea       History of Present Illness     She is c/o abd discomfort started 8 days ago after she ate popcorn from outside with N, sour burps, gerd sx, stomach discomfort , Diarrhea with poor appetite.   Saw APRN at our office, started on Zofran with blend diet.   Since then she feels little better , No more V, Diarrhea , still has N and sour burps, feels fullness and lot of gasses.  She is on ozempic since Feb, dose increased to 1 mg 3 weeks ago for wt loss.   Labs showed Elevated liver enzy for > 6 months , she stopped taking Valtrex hoping her level improves.     Labs and documentation from PCP / consulting physician has been reviewed       The following portions of the patient's history were reviewed and updated as appropriate: allergies, current medications, past family history, past medical history, past social history, past surgical history and problem list.        Review of Systems   Constitutional: Positive for appetite change. Negative for chills and fever.   Gastrointestinal: Positive for abdominal distention, abdominal pain, nausea, GERD and indigestion. Negative for constipation, diarrhea and vomiting.       Vitals:    05/08/23 1303   BP: 122/88   Pulse: 83   Resp: 16   Temp: 97.8 °F (36.6 °C)   SpO2: 98%           05/08/23  1303   Weight: 90.2 kg (198 lb 12.8 oz)         Body mass index is 38.83 kg/m².      Current Outpatient Medications   Medication Sig Dispense Refill   • ALBUTEROL SULFATE HFA IN Inhale As Needed.     • azelastine (ASTELIN) 0.1 % nasal spray      • beclomethasone (QVAR) 40 MCG/ACT inhaler Inhale 2 puffs 2 (Two) Times a Day.     • clobetasol (TEMOVATE) 0.05 % ointment 2 (Two) Times a Day.     • cycloSPORINE (RESTASIS) 0.05 % ophthalmic emulsion 1 drop 2 (Two) Times a Day.     • diclofenac  (VOLTAREN) 1 % gel gel Apply 4 g topically to the appropriate area as directed 4 (Four) Times a Day As Needed.     • Fexofenadine HCl (ALLEGRA ALLERGY PO) Take 180 mg by mouth As Needed.     • FLUoxetine (PROzac) 40 MG capsule Take 1 capsule by mouth Daily.     • guanFACINE (TENEX) 2 MG tablet Take 1 tablet by mouth Daily.     • L-THEANINE PO Take 200 mg by mouth 2 (Two) Times a Day.     • mometasone (NASONEX) 50 MCG/ACT nasal spray      • montelukast (SINGULAIR) 10 MG tablet Take 1 tablet by mouth every night at bedtime. 90 tablet 1   • omeprazole (priLOSEC) 20 MG capsule Take 1 capsule by mouth Daily.     • ondansetron (Zofran) 4 MG tablet Take 1 tablet by mouth Every 8 (Eight) Hours As Needed for Nausea or Vomiting. 30 tablet 0   • pramipexole (MIRAPEX) 0.5 MG tablet TAKE ONE TABLET BY MOUTH ONCE NIGHTLY 90 tablet 1   • pregabalin (LYRICA) 75 MG capsule Take 1 capsule by mouth Daily.     • PreviDent 5000 Sensitive 1.1-5 % paste Daily.     • rizatriptan (Maxalt) 10 MG tablet Take 1 tablet by mouth 1 (One) Time As Needed for Migraine. May repeat in 2 hours if needed 30 tablet 1   • Semaglutide, 2 MG/DOSE, (OZEMPIC) 8 MG/3ML solution pen-injector Inject 2 mg under the skin into the appropriate area as directed 1 (One) Time Per Week. 9 mL 1   • vitamin B-12 (CYANOCOBALAMIN) 100 MCG tablet Take 3 tablets by mouth Daily.       No current facility-administered medications for this visit.                Objective   Physical Exam  Vitals and nursing note reviewed.   Constitutional:       General: She is not in acute distress.     Appearance: She is not ill-appearing, toxic-appearing or diaphoretic.   HENT:      Mouth/Throat:      Mouth: Mucous membranes are moist.   Eyes:      General: No scleral icterus.  Cardiovascular:      Rate and Rhythm: Normal rate and regular rhythm.      Heart sounds: Normal heart sounds. No murmur heard.  Pulmonary:      Effort: Pulmonary effort is normal. No respiratory distress.      Breath  sounds: Normal breath sounds. No stridor. No wheezing or rhonchi.   Abdominal:      General: Bowel sounds are normal. There is distension.      Palpations: Abdomen is soft. There is no mass.      Tenderness: There is abdominal tenderness. There is no guarding or rebound.      Hernia: No hernia is present.      Comments: Mild ttp over epigast area   Lymphadenopathy:      Cervical: Cervical adenopathy present.   Neurological:      Mental Status: She is alert and oriented to person, place, and time.   Psychiatric:         Mood and Affect: Mood normal.         Behavior: Behavior normal.         Thought Content: Thought content normal.           Assessment & Plan   Diagnoses and all orders for this visit:    1. Upper abdominal pain (Primary);  - Most likely her sx due to increased dose of Ozempic , advised to hold on it for now to see how she is doing , consider keep her on lower doses.     2. Nausea and vomiting, unspecified vomiting type;  - Continue Zofran PRN    3. Elevated liver enzymes;  - Close monitoring     4. Class 2 severe obesity due to excess calories with serious comorbidity and body mass index (BMI) of 38.0 to 38.9 in adult  - Continue lifestyle modification , wt lose, eats HD     5. Diarrhea, unspecified type;  - Resolved     6. GERD without esophagitis ;  - Advised to take Omep 20 mg BID or 40 mg in am before BF.            Patient was given instructions and counseling regarding her condition or for health maintenance advice.   Please see specific information pulled into the AVS if appropriate.        I have fully discussed the nature of the medical condition(s) risks, complications, management, safe and proper use of medications.   Pt stated no allergy to the above prescribed medication.  I have discussed the SIDE EFFECT OF MEDICATION and importance TO report any side effect , the patient expressed good understanding.  Encouraged medication compliance and the importance of keeping scheduled follow up  appointments with me and any other providers.    Patient instructed to follow up with our office for results on any labs/imaging ordered during this visit.    Home care discussed  All questions answered  Patient verbalizes understanding and agrees to treatment plan.     Follow up: Return for if no better or worsening symptoms.

## 2023-05-12 RX ORDER — PRAMIPEXOLE DIHYDROCHLORIDE 0.5 MG/1
TABLET ORAL
Qty: 90 TABLET | Refills: 1 | Status: SHIPPED | OUTPATIENT
Start: 2023-05-12

## 2023-05-12 RX ORDER — MONTELUKAST SODIUM 10 MG/1
TABLET ORAL
Qty: 90 TABLET | Refills: 1 | Status: SHIPPED | OUTPATIENT
Start: 2023-05-12

## 2023-05-23 ENCOUNTER — OFFICE VISIT (OUTPATIENT)
Dept: FAMILY MEDICINE CLINIC | Facility: CLINIC | Age: 38
End: 2023-05-23
Payer: MEDICARE

## 2023-05-23 VITALS
TEMPERATURE: 98 F | SYSTOLIC BLOOD PRESSURE: 114 MMHG | WEIGHT: 197 LBS | OXYGEN SATURATION: 99 % | HEART RATE: 94 BPM | DIASTOLIC BLOOD PRESSURE: 78 MMHG | HEIGHT: 60 IN | BODY MASS INDEX: 38.68 KG/M2

## 2023-05-23 DIAGNOSIS — R11.2 NAUSEA AND VOMITING, UNSPECIFIED VOMITING TYPE: ICD-10-CM

## 2023-05-23 DIAGNOSIS — K21.9 GASTROESOPHAGEAL REFLUX DISEASE WITHOUT ESOPHAGITIS: ICD-10-CM

## 2023-05-23 DIAGNOSIS — R10.10 UPPER ABDOMINAL PAIN: Primary | ICD-10-CM

## 2023-05-23 DIAGNOSIS — E66.09 CLASS 2 OBESITY DUE TO EXCESS CALORIES WITHOUT SERIOUS COMORBIDITY WITH BODY MASS INDEX (BMI) OF 38.0 TO 38.9 IN ADULT: ICD-10-CM

## 2023-05-23 RX ORDER — PANTOPRAZOLE SODIUM 40 MG/1
40 TABLET, DELAYED RELEASE ORAL DAILY
Qty: 30 TABLET | Refills: 2 | Status: SHIPPED | OUTPATIENT
Start: 2023-05-23

## 2023-05-23 RX ORDER — SUCRALFATE 1 G/1
1 TABLET ORAL 4 TIMES DAILY
Qty: 90 TABLET | Refills: 2 | Status: SHIPPED | OUTPATIENT
Start: 2023-05-23

## 2023-05-23 NOTE — PROGRESS NOTES
"Answers for HPI/ROS submitted by the patient on 5/16/2023  What is the primary reason for your visit?: Abdominal Pain    Chief Complaint  Abdominal Pain (1 month with abdominal pain epigastric and 1 time vomiting had zofran increased pantoprazole 20 bid and seem to help ) and Weight Check (Is on hold ozempic due to abdominal issues )    Subjective        Marycarmen Gauthier presents to [facility]  History of Present Illness  Very pleasant 38-year-old female here for follow-up obesity, she was started on Ozempic but has had symptoms of epigastric abdominal pain and vomiting.  She was advised to hold medications.  Lipase normal at 19 two weeks ago.  It felt like a full feeling that was different than before,  This felt like she was sick.  Since then she has increased her omeprazole to 40mg and has taken zofran and stopped the Ozempic without improvement in her symptoms.  The thing that has helped her the most was increasing the omeprazole.  Since she did not have improved symptoms with stopping the Ozempic and she has had over 20 pounds of weight loss she would like to continue with this and restarted the 2 mg dose last Wednesday.  She did not feel that this worsened her symptoms.     Objective   Vital Signs:  /78   Pulse 94   Temp 98 °F (36.7 °C)   Ht 152.4 cm (60\")   Wt 89.4 kg (197 lb)   SpO2 99%   BMI 38.47 kg/m²   Estimated body mass index is 38.47 kg/m² as calculated from the following:    Height as of this encounter: 152.4 cm (60\").    Weight as of this encounter: 89.4 kg (197 lb).             Physical Exam  Vitals and nursing note reviewed.   Constitutional:       General: She is not in acute distress.     Appearance: She is well-developed.   HENT:      Head: Normocephalic.      Nose: Nose normal.   Eyes:      General: No scleral icterus.  Pulmonary:      Effort: Pulmonary effort is normal. No respiratory distress.   Musculoskeletal:         General: Normal range of " motion.   Skin:     General: Skin is warm and dry.      Findings: No rash.   Neurological:      Mental Status: She is alert and oriented to person, place, and time.   Psychiatric:         Behavior: Behavior normal.         Thought Content: Thought content normal.         Judgment: Judgment normal.        Result Review :  The following data was reviewed by: Hellen Lagunas MD on 05/23/2023:  CMP        9/21/2022    08:12 2/23/2023    09:46 5/4/2023    15:22   CMP   Glucose 97   105   94     BUN 11   13   10     Creatinine 0.62   0.59   0.77     Sodium 138   138   138     Potassium 5.1   4.5   4.0     Chloride 102   100   102     Calcium 9.6   9.6   9.9     Total Protein 6.8   7.6   7.1     Albumin 4.40   4.7   4.5     Globulin 2.4   2.9   2.6     Total Bilirubin 0.3   <0.2   0.5     Alkaline Phosphatase 82   103   104     AST (SGOT) 45   37   51     ALT (SGPT) 42   38   54     BUN/Creatinine Ratio 17.7   22.0   13.0       CBC        9/21/2022    08:12 11/14/2022    10:13 2/23/2023    09:46   CBC   WBC 8.75   12.19      12.15     RBC 4.30   4.19      4.55     Hemoglobin 13.7   13.4      14.6     Hematocrit 41.4   42.7      43.0     MCV 96.3   101.9      94.5     MCH 31.9   32.0      32.1     MCHC 33.1   31.4      34.0     RDW 12.7   12.8      11.8     Platelets 346   340      413         This result is from an external source.     Lipid Panel        9/21/2022    08:12 2/23/2023    09:46   Lipid Panel   Total Cholesterol 252   293     Triglycerides 316   268     HDL Cholesterol 40   59     VLDL Cholesterol 59   51     LDL Cholesterol  153   183       TSH        9/21/2022    08:12 2/23/2023    09:46   TSH   TSH 2.270   2.850       A1C Last 3 Results        9/21/2022    08:12 2/23/2023    09:46   HGBA1C Last 3 Results   Hemoglobin A1C 5.60   5.40         Lipase 19, last at 11, amylase 42 which is normal.    Liver enzymes slightly elevated,           Assessment and Plan   Diagnoses and all orders for this visit:    1.  Upper abdominal pain (Primary)  -     Ambulatory Referral to Gastroenterology  -     US Abdomen Limited; Future    2. Nausea and vomiting, unspecified vomiting type  -     Ambulatory Referral to Gastroenterology  -     US Abdomen Limited; Future    3. Gastroesophageal reflux disease without esophagitis  -     Ambulatory Referral to Gastroenterology  -     US Abdomen Limited; Future  -     pantoprazole (Protonix) 40 MG EC tablet; Take 1 tablet by mouth Daily.  Dispense: 30 tablet; Refill: 2  -     sucralfate (Carafate) 1 g tablet; Take 1 tablet by mouth 4 (Four) Times a Day.  Dispense: 90 tablet; Refill: 2    4. Class 2 obesity due to excess calories without serious comorbidity with body mass index (BMI) of 38.0 to 38.9 in adult    Very pleasant 38-year-old female here to follow-up for 2 main concerns.    First abdominal pain: This does appear ascitic related, likely GERD or possibly PUD.  She also has had significant gallbladder disease, status postcholecystectomy.  Some of her symptoms do coincide with choledocholithiasis.  I do think further evaluation ultrasound of the abdomen is warranted especially with the slightly elevated liver enzymes.  We discussed stopping the Ozempic but for right now she would like to continue as she has not noticed a correlation with this medication and her symptoms.  Plan:  - Stop omeprazole 20 twice daily and start pantoprazole 40 mg daily  - Start Carafate, take continuously for the next week and then as needed.  - Follow-up with GI, she would likely need an EGD.  -  abdominal ultrasound as above    In terms of obesity:  She is doing quite well, she is lost over 20 pounds already and is feeling much better.  Her stamina is better, she is able to travel, stand and walk for longer periods of time without feeling fatigued.  She is also been able to start Pilates and has really enjoyed this.  She is eating a bland diet and feels from an energy standpoint much better than before.  We did  do amylase and lipase which were both normal, not likely pancreatitis.  Plan   -Continue Ozempic 2 mg daily for now.  We will follow-up with GI and if the GI work-up is negative then it probably would mean that we need to discontinue this medication.  She is aware of this despite her great response to the treatment.       Follow Up   Return in about 3 months (around 8/23/2023), or if symptoms worsen or fail to improve, for Recheck weight loss and abdominal pain .  Patient was given instructions and counseling regarding her condition or for health maintenance advice. Please see specific information pulled into the AVS if appropriate.     Hellen Lagunas MD

## 2023-06-05 ENCOUNTER — HOSPITAL ENCOUNTER (OUTPATIENT)
Dept: ULTRASOUND IMAGING | Facility: HOSPITAL | Age: 38
Discharge: HOME | End: 2023-06-05
Admitting: FAMILY MEDICINE
Payer: MEDICARE

## 2023-06-05 DIAGNOSIS — R11.2 NAUSEA AND VOMITING, UNSPECIFIED VOMITING TYPE: ICD-10-CM

## 2023-06-05 DIAGNOSIS — K21.9 GASTROESOPHAGEAL REFLUX DISEASE WITHOUT ESOPHAGITIS: ICD-10-CM

## 2023-06-05 DIAGNOSIS — R10.10 UPPER ABDOMINAL PAIN: ICD-10-CM

## 2023-06-05 PROCEDURE — 76705 ECHO EXAM OF ABDOMEN: CPT

## 2023-06-06 NOTE — PROGRESS NOTES
Please call patient back with results.  The ultrasound of the abdomen has resulted as positive for fatty liver disease and is stable mass that is likely hemangioma that has not grown since 2019.  No abnormalities of the gallbladder duct tree or new stones, the duct is appropriately sized.  Please let us know if you have further questions.  I think the symptoms are likely caused from medication as we discussed.  If it is not improving it probably would be worth considering stopping this medication unless it is not bothering you significantly.  I would like to trend your pancreas enzymes with her next visit as well    Thank you

## 2023-08-16 ENCOUNTER — OFFICE VISIT (OUTPATIENT)
Dept: CARDIOLOGY | Facility: CLINIC | Age: 38
End: 2023-08-16
Payer: MEDICARE

## 2023-08-16 VITALS
DIASTOLIC BLOOD PRESSURE: 58 MMHG | WEIGHT: 198 LBS | SYSTOLIC BLOOD PRESSURE: 90 MMHG | BODY MASS INDEX: 38.87 KG/M2 | HEIGHT: 60 IN | HEART RATE: 79 BPM

## 2023-08-16 DIAGNOSIS — I10 ESSENTIAL HYPERTENSION: ICD-10-CM

## 2023-08-16 DIAGNOSIS — G90.A POTS (POSTURAL ORTHOSTATIC TACHYCARDIA SYNDROME): Primary | ICD-10-CM

## 2023-08-16 DIAGNOSIS — E78.01 FAMILIAL HYPERCHOLESTEROLEMIA: ICD-10-CM

## 2023-08-16 PROCEDURE — 99214 OFFICE O/P EST MOD 30 MIN: CPT | Performed by: INTERNAL MEDICINE

## 2023-08-16 PROCEDURE — 1160F RVW MEDS BY RX/DR IN RCRD: CPT | Performed by: INTERNAL MEDICINE

## 2023-08-16 PROCEDURE — 3074F SYST BP LT 130 MM HG: CPT | Performed by: INTERNAL MEDICINE

## 2023-08-16 PROCEDURE — 1159F MED LIST DOCD IN RCRD: CPT | Performed by: INTERNAL MEDICINE

## 2023-08-16 PROCEDURE — 93000 ELECTROCARDIOGRAM COMPLETE: CPT | Performed by: INTERNAL MEDICINE

## 2023-08-16 PROCEDURE — 3078F DIAST BP <80 MM HG: CPT | Performed by: INTERNAL MEDICINE

## 2023-08-16 NOTE — PROGRESS NOTES
Date of Office Visit: 2023  Encounter Provider: Jazz Hardin MD  Place of Service: [facility] CARDIOLOG[facility]  Patient Name: Marycarmen Gauthier  :1985      Patient ID:  Marycarmen Gauthier is a 38 y.o. female is here for  followup for POTS.        History of Present Illness    She has a history of hypertension, fibromyalgia, hypermobile joints/sicca syndrome (Dr. Crook), posttraumatic stress disorder, anxiety, Lyme disease, hysterectomy 2018. She is followed in our office by Dr. Hardin for POTS and Erler Danlos syndrome III. She had a tilt table study done 17 which showed inappropriate sinus tachycardia but no evidence of vasovagal syncope.  Tachycardia occurred after nitroglycerin.     She was seen at the [facility] 2018.  She went there for POTS.  In , she had premature rupture of membranes and had her son prematurely at 29 weeks.  He was in the  intensive care unit for 3 months.  A after that, she began having severe anxiety.  Her autonomic testing done at [facility] showed a normal QSART-no peripheral autonomic neuropathy.  Her tilt table study showed postural tachycardia but stable blood pressure.  They thought she had postural orthostatic tachycardia syndrome and recommended exercise and consideration of beta blockers.  In addition they recommended 3-5 g of sodium a day, to 2.5 L of fluid per day and sleeping with her head raised 6-10 inches as well as compression stockings avoiding alcohol and large meals.     She is  and has one child who was born premature.  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  She has occasional alcohol.  She has one cup of coffee per day.  There is no family history of premature cardiovascular disease.     She had a hysterectomy with Dr. Mason 2018.  She had a cholecystectomy 2019 and had a postop infection.      She continues to have stressors in life; her parents and  her in-laws have been ill and she also cares full-time for her son.  Her psychiatrist prescribed hydroxyzine which has been helping.  She saw the [place] [facility] in 2022 for adrenaline surges; guanfacine was stopped and she was started on a diazepam patch which made her feel terrible.  That was stopped and she is now on guanfacine 2 mg twice daily.  Her amlodipine is now 10 mg daily.  She had bilateral meniscus repair in 2022 and has been having more leg swelling; she is using compression stockings.     Labs on 5/4/2023 show AST 51, ALT 54, otherwise normal CMP.  Lipids on 2/23/2023 showed total cholesterol 293, HDL 59, , triglycerides 268, VLDL 51.  Echo done 2/24/2023 shows ejection fraction 56- 60% with mild mitral and tricuspid insufficiency.  She had a nonischemic treadmill stress study done 2/24/2023, exercising on a Antony protocol for 6 minutes and 30 seconds, elevated blood pressure response to exercise.    Orthostatics done today show blood pressure 100/80 with heart rate of 72 after lying for 5 minutes, blood pressure 90/60 with heart of 82 after standing for 1 minute, blood pressure 90/58 with heart of 79 after standing for 3 minutes.    She had 20 pounds of fluid weight gain with amlodipine and once that stopped, she started feeling better.  She is doing Pilates and feeling well.  Her  quit his job so that he could stay at home with Ata who is now in fifth grade.  Ata has some special needs and goes to [facility] [employer].  This has been a stress relief for her and I think it helps her feel better.  She has episodes of heart racing but there are much fewer than what she had before.  Overall she is feeling well.  She has had no syncope or near syncope.  She is taking her medications as directed without difficulty.    Past Medical History:   Diagnosis Date    Allergic 01/01/1986    Anxiety     Asthma     Autonomic dysfunction 2017    Bulging lumbar disc 2015    Chronic pain      Chronic sinus infection     Coronary artery disease     chris-danlos syndrome    Depression     Dizziness     VICKERS (dyspnea on exertion)     Essential hypertension     Fatigue     Fibromyalgia     Fibromyalgia, primary 2015    Flexor hallucis longus tendinitis 2013    GERD (gastroesophageal reflux disease) 1998    H/O Bilateral carpal tunnel syndrome     H/O transfusion of packed red blood cells     History of abnormal cervical Pap smear     History of anemia     History of infectious mononucleosis     History of medical problems     Lichen Sclerosus, Hypermobility, herpes virus    History of tachycardia     POTS    Hyperlipidemia     Hypermobile joint syndrome of multiple sites     Hypermobile joints     Irritable bowel syndrome     Kidney stone     History of 2    Leukocytosis     Lyme disease 2019    Formatting of this note might be different from the original. Chronic    Numbness and tingling in both hands     Obesity     Osteoarthritis     Palpitations     POTS (postural orthostatic tachycardia syndrome) 2017    PROM (premature rupture of membranes)     PTSD (post-traumatic stress disorder) 2014    Seasonal allergies     Sleep apnea 2022    Doc ordered auto sleep sense    Snoring     SOB (shortness of breath)     Syncope and collapse     Visual impairment     SICCA         Past Surgical History:   Procedure Laterality Date    ANKLE SURGERY Right 2014    FHL tendon release    APPENDECTOMY  2019    incisions took long time to close    BREAST SURGERY  10/15/2013    Reduction    CARPAL TUNNEL RELEASE Right 2017    REVISE MEDIAN N/CARPAL TUNNEL SURG     SECTION  2012    CHOLECYSTECTOMY  2019    post op infection    COLONOSCOPY  2017    HYSTERECTOMY  2018    JOINT REPLACEMENT      Rotator cuff and fhl tendon release (right sides)    LAPAROSCOPIC CHOLECYSTECTOMY W/ CHOLANGIOGRAPHY      ROTATOR CUFF REPAIR Right     SINUS SURGERY       WISDOM TOOTH EXTRACTION         Current Outpatient Medications on File Prior to Visit   Medication Sig Dispense Refill    ALBUTEROL SULFATE HFA IN Inhale As Needed.      azelastine (ASTELIN) 0.1 % nasal spray       beclomethasone (QVAR) 40 MCG/ACT inhaler Inhale 2 puffs 2 (Two) Times a Day.      clobetasol (TEMOVATE) 0.05 % ointment 2 (Two) Times a Day.      cycloSPORINE (RESTASIS) 0.05 % ophthalmic emulsion 1 drop 2 (Two) Times a Day.      diclofenac (VOLTAREN) 1 % gel gel Apply 4 g topically to the appropriate area as directed 4 (Four) Times a Day As Needed.      Fexofenadine HCl (ALLEGRA ALLERGY PO) Take 180 mg by mouth As Needed.      FLUoxetine (PROzac) 40 MG capsule Take 1 capsule by mouth Daily.      guanFACINE (TENEX) 2 MG tablet Take 1 tablet by mouth Daily.      L-THEANINE PO Take 200 mg by mouth 2 (Two) Times a Day.      mometasone (NASONEX) 50 MCG/ACT nasal spray       montelukast (SINGULAIR) 10 MG tablet TAKE ONE TABLET BY MOUTH EVERY NIGHT AT BEDTIME 90 tablet 1    pantoprazole (Protonix) 40 MG EC tablet Take 1 tablet by mouth Daily. 30 tablet 2    pramipexole (MIRAPEX) 0.5 MG tablet TAKE ONE TABLET BY MOUTH ONCE NIGHTLY 90 tablet 1    pregabalin (LYRICA) 75 MG capsule Take 1 capsule by mouth Daily.      PreviDent 5000 Sensitive 1.1-5 % paste Daily.      rizatriptan (Maxalt) 10 MG tablet Take 1 tablet by mouth 1 (One) Time As Needed for Migraine. May repeat in 2 hours if needed 30 tablet 1    vitamin B-12 (CYANOCOBALAMIN) 100 MCG tablet Take 3 tablets by mouth Daily.      ondansetron (Zofran) 4 MG tablet Take 1 tablet by mouth Every 8 (Eight) Hours As Needed for Nausea or Vomiting. (Patient not taking: Reported on 8/16/2023) 30 tablet 0    [DISCONTINUED] Semaglutide, 2 MG/DOSE, (OZEMPIC) 8 MG/3ML solution pen-injector Inject 2 mg under the skin into the appropriate area as directed 1 (One) Time Per Week. (Patient not taking: Reported on 8/16/2023) 9 mL 1    [DISCONTINUED] sucralfate (Carafate) 1 g  "tablet Take 1 tablet by mouth 4 (Four) Times a Day. (Patient not taking: Reported on 8/16/2023) 90 tablet 2     No current facility-administered medications on file prior to visit.       Social History     Socioeconomic History    Marital status:      Spouse name: Roe    Number of children: 1    Years of education: College   Tobacco Use    Smoking status: Never     Passive exposure: Never    Smokeless tobacco: Never    Tobacco comments:     CAFFEINE USE   Vaping Use    Vaping Use: Never used   Substance and Sexual Activity    Alcohol use: Not Currently     Comment: 1 drink every few months socially    Drug use: No    Sexual activity: Yes     Partners: Male     Birth control/protection: Surgical, None     Comment: , one son           ROS    Procedures    ECG 12 Lead    Date/Time: 8/16/2023 9:17 AM  Performed by: Jazz Hardin MD  Authorized by: Jazz Hardin MD   Comparison: compared with previous ECG   Similar to previous ECG  Rhythm: sinus rhythm    Clinical impression: normal ECG            Objective:      Vitals:    08/16/23 0858 08/16/23 0903 08/16/23 0905 08/16/23 0912   BP: 100/78 100/80 90/60 90/58   Pulse: 68 72 82 79   Weight: 89.8 kg (198 lb)      Height: 152.4 cm (60\")        Body mass index is 38.67 kg/mý.    Vitals reviewed.   Constitutional:       General: Not in acute distress.     Appearance: Well-developed. Not diaphoretic.   Eyes:      General: No scleral icterus.     Conjunctiva/sclera: Conjunctivae normal.   HENT:      Head: Normocephalic and atraumatic.   Neck:      Thyroid: No thyromegaly.      Vascular: No carotid bruit or JVD.      Lymphadenopathy: No cervical adenopathy.   Pulmonary:      Effort: Pulmonary effort is normal. No respiratory distress.      Breath sounds: Normal breath sounds. No wheezing. No rhonchi. No rales.   Chest:      Chest wall: Not tender to palpatation.   Cardiovascular:      Normal rate. Regular rhythm.      Murmurs: There is no " murmur.      No gallop.  No rub.   Pulses:     Intact distal pulses.      Carotid: 2+ bilaterally.     Radial: 2+ bilaterally.     Dorsalis pedis: 2+ bilaterally.     Posterior tibial: 2+ bilaterally.  Edema:     Peripheral edema absent.   Abdominal:      General: Bowel sounds are normal. There is no distension or abdominal bruit.      Palpations: Abdomen is soft. There is no abdominal mass.      Tenderness: There is no abdominal tenderness.   Musculoskeletal:         General: No deformity.      Extremities: No clubbing present.     Cervical back: Neck supple. Skin:     General: Skin is warm and dry. There is no cyanosis.      Coloration: Skin is not pale.      Findings: No rash.   Neurological:      Mental Status: Alert and oriented to person, place, and time.      Cranial Nerves: No cranial nerve deficit.   Psychiatric:         Judgment: Judgment normal.       Lab Review:       Assessment:      Diagnosis Plan   1. POTS (postural orthostatic tachycardia syndrome)        2. Essential hypertension        3. Familial hypercholesterolemia          POTS, stable on guaifenesin, Qvar, Singulair and fluoxetine.  Continue Pilates, continue fluid and salt, overall doing well.  Hypermobile joints.   Hypertension,  Well controlled on no medications.   Lyme disease, stable     Plan:       See Deanne in 1 year, no medication changes, continue exercise.  Overall looks really good.

## 2023-08-18 DIAGNOSIS — K21.9 GASTROESOPHAGEAL REFLUX DISEASE WITHOUT ESOPHAGITIS: ICD-10-CM

## 2023-08-18 RX ORDER — PANTOPRAZOLE SODIUM 40 MG/1
40 TABLET, DELAYED RELEASE ORAL DAILY
Qty: 30 TABLET | Refills: 2 | Status: SHIPPED | OUTPATIENT
Start: 2023-08-18

## 2023-09-01 ENCOUNTER — OFFICE VISIT (OUTPATIENT)
Dept: FAMILY MEDICINE CLINIC | Facility: CLINIC | Age: 38
End: 2023-09-01
Payer: MEDICARE

## 2023-09-01 VITALS
OXYGEN SATURATION: 98 % | TEMPERATURE: 97.6 F | HEIGHT: 60 IN | BODY MASS INDEX: 38.87 KG/M2 | SYSTOLIC BLOOD PRESSURE: 126 MMHG | DIASTOLIC BLOOD PRESSURE: 82 MMHG | WEIGHT: 198 LBS | HEART RATE: 72 BPM

## 2023-09-01 DIAGNOSIS — R73.9 HYPERGLYCEMIA: ICD-10-CM

## 2023-09-01 DIAGNOSIS — E66.09 CLASS 2 OBESITY DUE TO EXCESS CALORIES WITHOUT SERIOUS COMORBIDITY WITH BODY MASS INDEX (BMI) OF 38.0 TO 38.9 IN ADULT: Primary | ICD-10-CM

## 2023-09-01 DIAGNOSIS — E88.81 INSULIN RESISTANCE: ICD-10-CM

## 2023-09-01 DIAGNOSIS — Z23 NEED FOR DIPHTHERIA-TETANUS-PERTUSSIS (TDAP) VACCINE: ICD-10-CM

## 2023-09-01 DIAGNOSIS — G90.A POTS (POSTURAL ORTHOSTATIC TACHYCARDIA SYNDROME): ICD-10-CM

## 2023-09-01 DIAGNOSIS — I10 ESSENTIAL HYPERTENSION: ICD-10-CM

## 2023-09-01 RX ORDER — OMEPRAZOLE 20 MG/1
20 CAPSULE, DELAYED RELEASE ORAL DAILY
COMMUNITY

## 2023-09-01 RX ORDER — LIRAGLUTIDE 6 MG/ML
INJECTION, SOLUTION SUBCUTANEOUS
Qty: 18 ML | Refills: 0 | Status: SHIPPED | OUTPATIENT
Start: 2023-09-01 | End: 2023-11-30

## 2023-09-01 NOTE — PROGRESS NOTES
"Chief Complaint  Weight Check (Still  trying to loose weight and do more exercises ) and Abdominal Pain (Much better doing pilates now )    Subjective        Marycarmen Gautheir presents to [facility]  History of Present Illness  A pleasant 38-year-old female here to follow-up for weight loss.  She was previously on Ozempic but seem to trigger very substantial reflux and abdominal pain.  She did lose over 20 pounds and has been able to maintain since then.  Working hard to maintain healthy Lifestyle.  She is back on omeprazole but has the pantoprazole script but hasn't taken it.  She is doing pilates and has done 53 classes and has been wonderful.  She is able to maintain it and not getting hurt.     She feels that her issues with losing weight is eating in the evening. It has been a pattern to help her fall asleep for 10 years.  She was on hydroxyzine with her psychiatrist that did not help.     Objective   Vital Signs:  /82   Pulse 72   Temp 97.6 øF (36.4 øC)   Ht 152.4 cm (60\")   Wt 89.8 kg (198 lb)   SpO2 98%   BMI 38.67 kg/mý   Estimated body mass index is 38.67 kg/mý as calculated from the following:    Height as of this encounter: 152.4 cm (60\").    Weight as of this encounter: 89.8 kg (198 lb).       Class 2 Severe Obesity (BMI >=35 and <=39.9). Obesity-related health conditions include the following: obstructive sleep apnea, hypertension, dyslipidemias, and GERD. Obesity is improving with treatment. BMI is is above average; BMI management plan is completed. We discussed portion control, increasing exercise, and starting saxenda  .      Physical Exam  Vitals and nursing note reviewed.   Constitutional:       General: She is not in acute distress.     Appearance: She is well-developed.   HENT:      Head: Normocephalic.      Nose: Nose normal.   Cardiovascular:      Rate and Rhythm: Normal rate and regular rhythm.      Heart sounds: Normal heart sounds. No murmur " heard.  Pulmonary:      Effort: Pulmonary effort is normal. No respiratory distress.      Breath sounds: Normal breath sounds.   Musculoskeletal:         General: Normal range of motion.   Skin:     General: Skin is warm and dry.      Findings: No rash.   Neurological:      Mental Status: She is alert and oriented to person, place, and time.   Psychiatric:         Behavior: Behavior normal.         Thought Content: Thought content normal.         Judgment: Judgment normal.      Result Review :                   Assessment and Plan   Diagnoses and all orders for this visit:    1. Class 2 obesity due to excess calories without serious comorbidity with body mass index (BMI) of 38.0 to 38.9 in adult (Primary)  -     Liraglutide (Saxenda) 18 MG/3ML injection pen; Inject 0.6 mg under the skin into the appropriate area as directed Daily for 30 days, THEN 1.2 mg Daily for 30 days, THEN 1.8 mg Daily for 30 days.  Dispense: 18 mL; Refill: 0  -     Comprehensive Metabolic Panel  -     CBC & Differential  -     Lipid Panel  -     TSH Rfx On Abnormal To Free T4  -     Hemoglobin A1c    2. Insulin resistance  -     Liraglutide (Saxenda) 18 MG/3ML injection pen; Inject 0.6 mg under the skin into the appropriate area as directed Daily for 30 days, THEN 1.2 mg Daily for 30 days, THEN 1.8 mg Daily for 30 days.  Dispense: 18 mL; Refill: 0  -     TSH Rfx On Abnormal To Free T4  -     Hemoglobin A1c    3. Hyperglycemia  -     TSH Rfx On Abnormal To Free T4  -     Hemoglobin A1c    4. POTS (postural orthostatic tachycardia syndrome)  -     TSH Rfx On Abnormal To Free T4    5. Essential hypertension  -     Comprehensive Metabolic Panel  -     CBC & Differential  -     Lipid Panel  -     TSH Rfx On Abnormal To Free T4    6. Need for diphtheria-tetanus-pertussis (Tdap) vaccine  -     Tdap Vaccine => 8yo IM (BOOSTRIX)    Obesity stable, adverse effects of severe vomiting and worsening GERD on Ozempic.  She has been off medication for 3  months now and maintained her weight loss.  I think considering a different GLP-1 would be advisable.  We will transition to Saxenda as above.  Hopefully this will help with the insulin resistance piece especially with PCOS.    Will be due for labs today, hypertension well controlled, POTS stable, had recent eval with cardiology which was encouraging, recommendations to avoid amlodipine as this caused substantial weight gain.    Tdap today as above, will do pneumonia 20 at next appointment when in stock.  Out of stock today.         Follow Up   Return in about 3 months (around 12/1/2023), or if symptoms worsen or fail to improve, for Recheck weight loss .  Patient was given instructions and counseling regarding her condition or for health maintenance advice. Please see specific information pulled into the AVS if appropriate.     Hellen Lagunas MD

## 2023-09-02 LAB
ALBUMIN SERPL-MCNC: 4.1 G/DL (ref 3.5–5.2)
ALBUMIN/GLOB SERPL: 1.5 G/DL
ALP SERPL-CCNC: 91 U/L (ref 39–117)
ALT SERPL-CCNC: 29 U/L (ref 1–33)
AST SERPL-CCNC: 27 U/L (ref 1–32)
BASOPHILS # BLD AUTO: 0.05 10*3/MM3 (ref 0–0.2)
BASOPHILS NFR BLD AUTO: 0.5 % (ref 0–1.5)
BILIRUB SERPL-MCNC: 0.3 MG/DL (ref 0–1.2)
BUN SERPL-MCNC: 8 MG/DL (ref 6–20)
BUN/CREAT SERPL: 12.7 (ref 7–25)
CALCIUM SERPL-MCNC: 9.2 MG/DL (ref 8.6–10.5)
CHLORIDE SERPL-SCNC: 103 MMOL/L (ref 98–107)
CHOLEST SERPL-MCNC: 231 MG/DL (ref 0–200)
CO2 SERPL-SCNC: 26.3 MMOL/L (ref 22–29)
CREAT SERPL-MCNC: 0.63 MG/DL (ref 0.57–1)
EGFRCR SERPLBLD CKD-EPI 2021: 115.2 ML/MIN/1.73
EOSINOPHIL # BLD AUTO: 0.24 10*3/MM3 (ref 0–0.4)
EOSINOPHIL NFR BLD AUTO: 2.3 % (ref 0.3–6.2)
ERYTHROCYTE [DISTWIDTH] IN BLOOD BY AUTOMATED COUNT: 12.2 % (ref 12.3–15.4)
GLOBULIN SER CALC-MCNC: 2.7 GM/DL
GLUCOSE SERPL-MCNC: 90 MG/DL (ref 65–99)
HBA1C MFR BLD: 5.3 % (ref 4.8–5.6)
HCT VFR BLD AUTO: 41.3 % (ref 34–46.6)
HDLC SERPL-MCNC: 49 MG/DL (ref 40–60)
HGB BLD-MCNC: 13.8 G/DL (ref 12–15.9)
IMM GRANULOCYTES # BLD AUTO: 0.08 10*3/MM3 (ref 0–0.05)
IMM GRANULOCYTES NFR BLD AUTO: 0.8 % (ref 0–0.5)
LDLC SERPL CALC-MCNC: 154 MG/DL (ref 0–100)
LYMPHOCYTES # BLD AUTO: 2.87 10*3/MM3 (ref 0.7–3.1)
LYMPHOCYTES NFR BLD AUTO: 27.2 % (ref 19.6–45.3)
MCH RBC QN AUTO: 31.1 PG (ref 26.6–33)
MCHC RBC AUTO-ENTMCNC: 33.4 G/DL (ref 31.5–35.7)
MCV RBC AUTO: 93 FL (ref 79–97)
MONOCYTES # BLD AUTO: 0.8 10*3/MM3 (ref 0.1–0.9)
MONOCYTES NFR BLD AUTO: 7.6 % (ref 5–12)
NEUTROPHILS # BLD AUTO: 6.51 10*3/MM3 (ref 1.7–7)
NEUTROPHILS NFR BLD AUTO: 61.6 % (ref 42.7–76)
NRBC BLD AUTO-RTO: 0 /100 WBC (ref 0–0.2)
PLATELET # BLD AUTO: 402 10*3/MM3 (ref 140–450)
POTASSIUM SERPL-SCNC: 4.5 MMOL/L (ref 3.5–5.2)
PROT SERPL-MCNC: 6.8 G/DL (ref 6–8.5)
RBC # BLD AUTO: 4.44 10*6/MM3 (ref 3.77–5.28)
SODIUM SERPL-SCNC: 141 MMOL/L (ref 136–145)
TRIGL SERPL-MCNC: 155 MG/DL (ref 0–150)
TSH SERPL DL<=0.005 MIU/L-ACNC: 2.3 UIU/ML (ref 0.27–4.2)
VLDLC SERPL CALC-MCNC: 28 MG/DL (ref 5–40)
WBC # BLD AUTO: 10.55 10*3/MM3 (ref 3.4–10.8)

## 2023-09-09 ENCOUNTER — PATIENT MESSAGE (OUTPATIENT)
Dept: FAMILY MEDICINE CLINIC | Facility: CLINIC | Age: 38
End: 2023-09-09
Payer: MEDICARE

## 2023-09-11 ENCOUNTER — PRIOR AUTHORIZATION (OUTPATIENT)
Dept: FAMILY MEDICINE CLINIC | Facility: CLINIC | Age: 38
End: 2023-09-11
Payer: MEDICARE

## 2023-09-14 ENCOUNTER — TELEPHONE (OUTPATIENT)
Dept: FAMILY MEDICINE CLINIC | Facility: CLINIC | Age: 38
End: 2023-09-14

## 2023-09-14 NOTE — TELEPHONE ENCOUNTER
Caller: FABRICIO    Relationship to patient: [employer].     Best call back number: [phone]    Patient is needing: THEY RECEIVED THE APPEAL REQUEST FOR Liraglutide (Saxenda) 18 MG/3ML injection pen .  IT IS DENIED.  DOES NOT APPROVED WEIGHTLOSS DRUGS UNDER MEDICARE PART D COVERAGE.

## 2023-09-14 NOTE — PROGRESS NOTES
Please call patient back with results.  The labs has resulted as normal blood counts, improved cholesterol, normal kidney and liver function looks fantastic!!!,  Thyroid function is normal, prediabetes testing is negative.  I am really pleased to see the progress, keep up your great work.  Please let us know if you have further questions.     Thank you

## 2023-09-19 RX ORDER — TOPIRAMATE 25 MG/1
25 TABLET ORAL 2 TIMES DAILY
Qty: 60 TABLET | Refills: 2 | Status: SHIPPED | OUTPATIENT
Start: 2023-09-19

## 2023-09-19 NOTE — TELEPHONE ENCOUNTER
From: Marycarmen Gauthier  To: Hellen Lagunas  Sent: 9/9/2023 2:01 PM EDT  Subject: Denial of medication    [employer] notified me that the [employer] request was denied, stating its a benefit issue only. We can appeal but wanted to reach out and see if you'd received anything from them so we can try and appeal?  Thanks,   Marycarmen

## 2023-10-23 ENCOUNTER — PATIENT MESSAGE (OUTPATIENT)
Dept: FAMILY MEDICINE CLINIC | Facility: CLINIC | Age: 38
End: 2023-10-23
Payer: MEDICARE

## 2023-10-23 DIAGNOSIS — Q79.60 EHLERS-DANLOS SYNDROME: ICD-10-CM

## 2023-10-23 DIAGNOSIS — M54.50 ACUTE BILATERAL LOW BACK PAIN WITHOUT SCIATICA: Primary | ICD-10-CM

## 2023-10-24 ENCOUNTER — OFFICE VISIT (OUTPATIENT)
Dept: FAMILY MEDICINE CLINIC | Facility: CLINIC | Age: 38
End: 2023-10-24
Payer: MEDICARE

## 2023-10-24 VITALS
HEIGHT: 60 IN | TEMPERATURE: 97.6 F | WEIGHT: 193 LBS | HEART RATE: 75 BPM | BODY MASS INDEX: 37.89 KG/M2 | OXYGEN SATURATION: 100 % | SYSTOLIC BLOOD PRESSURE: 128 MMHG | DIASTOLIC BLOOD PRESSURE: 84 MMHG

## 2023-10-24 DIAGNOSIS — M54.50 ACUTE MIDLINE LOW BACK PAIN WITHOUT SCIATICA: Primary | ICD-10-CM

## 2023-10-24 DIAGNOSIS — R10.9 RIGHT FLANK PAIN: ICD-10-CM

## 2023-10-24 DIAGNOSIS — R10.9 FLANK PAIN: ICD-10-CM

## 2023-10-24 DIAGNOSIS — Q79.60 EHLERS-DANLOS SYNDROME: ICD-10-CM

## 2023-10-24 LAB
BILIRUB BLD-MCNC: NEGATIVE MG/DL
CLARITY, POC: ABNORMAL
COLOR UR: YELLOW
EXPIRATION DATE: ABNORMAL
GLUCOSE UR STRIP-MCNC: NEGATIVE MG/DL
KETONES UR QL: NEGATIVE
LEUKOCYTE EST, POC: ABNORMAL
Lab: 3632
NITRITE UR-MCNC: NEGATIVE MG/ML
PH UR: 7 [PH] (ref 5–8)
PROT UR STRIP-MCNC: NEGATIVE MG/DL
RBC # UR STRIP: NEGATIVE /UL
SP GR UR: 1.01 (ref 1–1.03)
UROBILINOGEN UR QL: NORMAL

## 2023-10-24 PROCEDURE — 3079F DIAST BP 80-89 MM HG: CPT | Performed by: FAMILY MEDICINE

## 2023-10-24 PROCEDURE — 81003 URINALYSIS AUTO W/O SCOPE: CPT | Performed by: FAMILY MEDICINE

## 2023-10-24 PROCEDURE — 1159F MED LIST DOCD IN RCRD: CPT | Performed by: FAMILY MEDICINE

## 2023-10-24 PROCEDURE — 1160F RVW MEDS BY RX/DR IN RCRD: CPT | Performed by: FAMILY MEDICINE

## 2023-10-24 PROCEDURE — 99214 OFFICE O/P EST MOD 30 MIN: CPT | Performed by: FAMILY MEDICINE

## 2023-10-24 PROCEDURE — 3074F SYST BP LT 130 MM HG: CPT | Performed by: FAMILY MEDICINE

## 2023-10-24 RX ORDER — CEPHALEXIN 500 MG/1
500 CAPSULE ORAL 2 TIMES DAILY
Qty: 6 CAPSULE | Refills: 0 | Status: SHIPPED | OUTPATIENT
Start: 2023-10-24 | End: 2023-10-27

## 2023-10-24 RX ORDER — ACETAMINOPHEN AND CODEINE PHOSPHATE 300; 30 MG/1; MG/1
1 TABLET ORAL EVERY 6 HOURS PRN
Qty: 30 TABLET | Refills: 3 | Status: SHIPPED | OUTPATIENT
Start: 2023-10-24

## 2023-10-24 RX ORDER — TAMSULOSIN HYDROCHLORIDE 0.4 MG/1
1 CAPSULE ORAL DAILY
Qty: 30 CAPSULE | Refills: 2 | Status: SHIPPED | OUTPATIENT
Start: 2023-10-24

## 2023-10-24 NOTE — PROGRESS NOTES
"Chief Complaint  Back Pain (Low back pain ,pt is afraid of kidney stone since she had ir before and started almost same  as she is doing now )    Subjective        Marycarmen Gauthier presents to [facility]  Back Pain  This is a new problem. The current episode started 1 to 4 weeks ago. The problem occurs constantly. The problem has been gradually worsening since onset. The pain is present in the sacro-iliac. The quality of the pain is described as aching. The pain is at a severity of 5/10. The pain is The same all the time. Associated symptoms include leg pain and paresthesias. Pertinent negatives include no abdominal pain, bladder incontinence, bowel incontinence, chest pain, dysuria, fever, headaches, paresis, pelvic pain, perianal numbness, tingling, weakness or weight loss. Risk factors include obesity.       Objective   Vital Signs:  /84   Pulse 75   Temp 97.6 °F (36.4 °C)   Ht 152.4 cm (60\")   Wt 87.5 kg (193 lb)   SpO2 100%   BMI 37.69 kg/m²   Estimated body mass index is 37.69 kg/m² as calculated from the following:    Height as of this encounter: 152.4 cm (60\").    Weight as of this encounter: 87.5 kg (193 lb).         Physical Exam  Vitals and nursing note reviewed.   Constitutional:       General: She is not in acute distress.     Appearance: She is well-developed.   HENT:      Head: Normocephalic.      Nose: Nose normal.   Eyes:      General: No scleral icterus.  Pulmonary:      Effort: Pulmonary effort is normal. No respiratory distress.   Musculoskeletal:         General: Normal range of motion.   Skin:     General: Skin is warm and dry.      Findings: No rash.   Neurological:      Mental Status: She is alert and oriented to person, place, and time.   Psychiatric:         Behavior: Behavior normal.         Thought Content: Thought content normal.         Judgment: Judgment normal.        Result Review :        Office Visit on 10/24/2023   Component Date Value " Ref Range Status    Color 10/24/2023 Yellow  Yellow, Straw, Dark Yellow, Windy Final    Clarity, UA 10/24/2023 Cloudy (A)  Clear Final    Specific Gravity  10/24/2023 1.015  1.005 - 1.030 Final    pH, Urine 10/24/2023 7.0  5.0 - 8.0 Final    Leukocytes 10/24/2023 Large (3+) (A)  Negative Final    Nitrite, UA 10/24/2023 Negative  Negative Final    Protein, POC 10/24/2023 Negative  Negative mg/dL Final    Glucose, UA 10/24/2023 Negative  Negative mg/dL Final    Ketones, UA 10/24/2023 Negative  Negative Final    Urobilinogen, UA 10/24/2023 Normal  Normal, 0.2 E.U./dL Final    Bilirubin 10/24/2023 Negative  Negative Final    Blood, UA 10/24/2023 Negative  Negative Final    Lot Number 10/24/2023 [id number]   Final    Expiration Date 10/24/2023 7/24   Final       A KUB chest X-Ray was ordered. My reading of this film is no evidence of nephrolithiasis. (No comparison films available: pending review by Radiologist.)    A Lumbar spine AP and lateral X-Ray was ordered. My reading of this film is normal joint spaces, no acute signs of fracture or dislocation. No spondylosis or spondylolisthesis. (No comparison films available: pending review by Radiologist.)           Assessment and Plan   Diagnoses and all orders for this visit:    1. Acute midline low back pain without sciatica (Primary)  -     XR Abdomen KUB  -     XR Spine Lumbar AP & Lateral  -     POC Urinalysis Dipstick, Automated  -     Urine Culture - Urine, Urine, Clean Catch    2. Lisbeth-Danlos syndrome    3. Flank pain  -     Urine Culture - Urine, Urine, Clean Catch    4. Right flank pain  -     Cancel: CT Abdomen Pelvis Without Contrast  -     tamsulosin (FLOMAX) 0.4 MG capsule 24 hr capsule; Take 1 capsule by mouth Daily. As needed for kidney stone  Dispense: 30 capsule; Refill: 2  -     acetaminophen-codeine (TYLENOL with CODEINE #3) 300-30 MG per tablet; Take 1 tablet by mouth Every 6 (Six) Hours As Needed for Moderate Pain.  Dispense: 30 tablet; Refill: 3  -      US Renal Bilateral  -     cephalexin (Keflex) 500 MG capsule; Take 1 capsule by mouth 2 (Two) Times a Day for 3 days.  Dispense: 6 capsule; Refill: 0    Very pleasant 38-year-old female here with right-sided flank pain that feels similar to prior kidney stones.  She does have EDS, lumbar x-ray is normal.  I think it is most likely nephrolithiasis with possible UTI?  Stone is possibly radiolucent, stat ultrasound ordered as above to be done tomorrow.  We will go ahead and treat her with Flomax and tamsulosin.  Her urine was also pretty cloudy so I will go ahead and start her on Keflex as well.  As she does not have systemic symptoms on chemistry over 3 days, she does have any evidence of pyelonephritis I will extend the course.         Follow Up   Return if symptoms worsen or fail to improve.  Patient was given instructions and counseling regarding her condition or for health maintenance advice. Please see specific information pulled into the AVS if appropriate.     Hellen Lagunas MD

## 2023-10-25 ENCOUNTER — HOSPITAL ENCOUNTER (OUTPATIENT)
Dept: ULTRASOUND IMAGING | Facility: HOSPITAL | Age: 38
Discharge: HOME | End: 2023-10-25
Admitting: FAMILY MEDICINE
Payer: MEDICARE

## 2023-10-25 PROCEDURE — 76775 US EXAM ABDO BACK WALL LIM: CPT

## 2023-10-26 LAB
BACTERIA UR CULT: NORMAL
BACTERIA UR CULT: NORMAL

## 2023-11-01 NOTE — TELEPHONE ENCOUNTER
It looks like tylenol 3 was sent in rather than tramadol. Was there a reason that the pharmacy could not fill tylenol 3 and we had to switch to tramadol?

## 2023-11-02 RX ORDER — TRAMADOL HYDROCHLORIDE 50 MG/1
50 TABLET ORAL EVERY 8 HOURS PRN
Qty: 30 TABLET | Refills: 2 | Status: SHIPPED | OUTPATIENT
Start: 2023-11-02

## 2023-11-03 ENCOUNTER — TELEPHONE (OUTPATIENT)
Dept: FAMILY MEDICINE CLINIC | Facility: CLINIC | Age: 38
End: 2023-11-03
Payer: MEDICARE

## 2023-11-03 RX ORDER — CYCLOBENZAPRINE HCL 5 MG
5 TABLET ORAL 3 TIMES DAILY PRN
Qty: 30 TABLET | Refills: 3 | Status: SHIPPED | OUTPATIENT
Start: 2023-11-03

## 2023-11-03 NOTE — TELEPHONE ENCOUNTER
Caller: [facility]    Relationship: Other    Best call back number: [phone]     What is the best time to reach you: BETWEEN 8:00 AM TO 7:00 PM CENTRAL    Who are you requesting to speak with (clinical staff, provider,  specific staff member): CLINICAL    Do you know the name of the person who called: OVIDIO    What was the call regarding: [facility] STATES THE PRIOR AUTHORIZATION FOR THE PATIENT CT OF PELVIS AND ABDOMEN IS PENDING DENIAL, REASON BEING PATIENT HAS NOT HAD EITHER X-RAY OR ULTRASOUNDS PREFORMED PRIOR TO A CT SCAN.    PLEASE CALL IF NEEDED.

## 2023-11-27 RX ORDER — PRAMIPEXOLE DIHYDROCHLORIDE 0.5 MG/1
TABLET ORAL
Qty: 90 TABLET | Refills: 1 | Status: SHIPPED | OUTPATIENT
Start: 2023-11-27

## 2023-12-14 ENCOUNTER — OFFICE VISIT (OUTPATIENT)
Dept: FAMILY MEDICINE CLINIC | Facility: CLINIC | Age: 38
End: 2023-12-14
Payer: MEDICARE

## 2023-12-14 VITALS
HEIGHT: 60 IN | SYSTOLIC BLOOD PRESSURE: 138 MMHG | BODY MASS INDEX: 38.48 KG/M2 | WEIGHT: 196 LBS | DIASTOLIC BLOOD PRESSURE: 90 MMHG | OXYGEN SATURATION: 96 % | HEART RATE: 102 BPM | TEMPERATURE: 97.6 F

## 2023-12-14 DIAGNOSIS — E88.819 INSULIN RESISTANCE: ICD-10-CM

## 2023-12-14 DIAGNOSIS — I10 ESSENTIAL HYPERTENSION: ICD-10-CM

## 2023-12-14 DIAGNOSIS — R73.9 HYPERGLYCEMIA: ICD-10-CM

## 2023-12-14 DIAGNOSIS — E78.01 FAMILIAL HYPERCHOLESTEROLEMIA: ICD-10-CM

## 2023-12-14 DIAGNOSIS — J30.1 CHRONIC SEASONAL ALLERGIC RHINITIS DUE TO POLLEN: ICD-10-CM

## 2023-12-14 DIAGNOSIS — M54.50 ACUTE MIDLINE LOW BACK PAIN WITHOUT SCIATICA: Primary | ICD-10-CM

## 2023-12-14 PROCEDURE — 1159F MED LIST DOCD IN RCRD: CPT | Performed by: FAMILY MEDICINE

## 2023-12-14 PROCEDURE — 3075F SYST BP GE 130 - 139MM HG: CPT | Performed by: FAMILY MEDICINE

## 2023-12-14 PROCEDURE — 1160F RVW MEDS BY RX/DR IN RCRD: CPT | Performed by: FAMILY MEDICINE

## 2023-12-14 PROCEDURE — 3080F DIAST BP >= 90 MM HG: CPT | Performed by: FAMILY MEDICINE

## 2023-12-14 PROCEDURE — 99214 OFFICE O/P EST MOD 30 MIN: CPT | Performed by: FAMILY MEDICINE

## 2023-12-14 RX ORDER — PREDNISONE 20 MG/1
20 TABLET ORAL 2 TIMES DAILY
COMMUNITY
Start: 2023-11-29

## 2023-12-14 NOTE — PROGRESS NOTES
"Chief Complaint  Back Pain (Is feeling much better no complains  has  pt and feel is coming back to normal ) and Hypertension (Steroids and inhalers making bp going high 140/90 sometimes bottom went up to 100 )    Subjective        Marycarmen Gauthier presents to [facility]  Back Pain    Hypertension        She is on her second round of prednisone for SOA with chest tighntess nd she can feel her BP going up.     Her back was doing better and when she got sick with RSV she was just in pain all over - and thinks it was the URI.  Now she is just doing tylnol and she is feeling a lot better. She can tell it is still there but nervous to get into pilates.     Migraines she has done better but Neuro thinks the Topamax caused a kidney stone so she has discontinued this.  She has not had any kidney stones since.    Objective   Vital Signs:  /90   Pulse 102   Temp 97.6 °F (36.4 °C)   Ht 152.4 cm (60\")   Wt 88.9 kg (196 lb)   SpO2 96%   BMI 38.28 kg/m²   Estimated body mass index is 38.28 kg/m² as calculated from the following:    Height as of this encounter: 152.4 cm (60\").    Weight as of this encounter: 88.9 kg (196 lb).       Physical Exam  Vitals and nursing note reviewed.   Constitutional:       General: She is not in acute distress.     Appearance: She is well-developed.   HENT:      Head: Normocephalic.      Nose: Nose normal.   Cardiovascular:      Rate and Rhythm: Normal rate and regular rhythm.      Heart sounds: Normal heart sounds. No murmur heard.  Pulmonary:      Effort: Pulmonary effort is normal. No respiratory distress.      Breath sounds: Normal breath sounds.   Musculoskeletal:         General: Normal range of motion.   Skin:     General: Skin is warm and dry.      Findings: No rash.   Neurological:      Mental Status: She is alert and oriented to person, place, and time.   Psychiatric:         Behavior: Behavior normal.         Thought Content: Thought content " normal.         Judgment: Judgment normal.        Result Review :                   Assessment and Plan   Diagnoses and all orders for this visit:    1. Acute midline low back pain without sciatica (Primary)    2. Chronic seasonal allergic rhinitis due to pollen    3. Essential hypertension  -     Comprehensive Metabolic Panel; Future  -     CBC & Differential; Future  -     Lipid Panel; Future    4. Familial hypercholesterolemia    5. Insulin resistance    6. Hyperglycemia  -     Hemoglobin A1c; Future    Pleasant 38-year-old female here for follow-up acute back pain that thankfully has improved with physical therapy, I do agree that restarting PT would be helpful.  She is known to be back to her normal life and is only needing Tylenol for pain, she has been consistent with her PT exercises, at this time I do not think further imaging is needed unless her symptoms regress.    Shortness of breath on prednisone tomorrow will be the last day, she has had elevated blood pressure just during this time I think you should go back to normal after that, I do think really working to not gain the weight she has worked really hard to lose to be important.    Agree with staying off Topamax as this could have provoked nephrolithiasis.    Hypertension not well-controlled today but I do think with coming off prednisone tomorrow her blood pressure will come back to her normal range less than 130/90.         Follow Up   Return in about 3 months (around 3/14/2024), or if symptoms worsen or fail to improve, for Medicare Wellness with fasting labs prior.  Patient was given instructions and counseling regarding her condition or for health maintenance advice. Please see specific information pulled into the AVS if appropriate.

## 2024-01-30 ENCOUNTER — PATIENT MESSAGE (OUTPATIENT)
Dept: FAMILY MEDICINE CLINIC | Facility: CLINIC | Age: 39
End: 2024-01-30
Payer: MEDICARE

## 2024-01-30 NOTE — TELEPHONE ENCOUNTER
From: Marycarmen Gauthier  To: Hellen Lagunas  Sent: 1/30/2024 5:22 AM EST  Subject: Back pain    Hi,  I wanted to reach out because my back pain is getting worse. I am trying my best to balance minimal pilates, PT and rest. I bought an SI belt to take and try when I see Derrell next. He says we are moving in the right direction and I know it will take time. I have been eating ibuprofen or Tylenol to keep going. I’ve been taking pain pills to sleep but am still waking up in pain. I hate still needing them after this all started in October.   Claudia

## 2024-02-06 ENCOUNTER — OFFICE VISIT (OUTPATIENT)
Dept: FAMILY MEDICINE CLINIC | Facility: CLINIC | Age: 39
End: 2024-02-06
Payer: MEDICARE

## 2024-02-06 VITALS
DIASTOLIC BLOOD PRESSURE: 90 MMHG | SYSTOLIC BLOOD PRESSURE: 130 MMHG | HEIGHT: 60 IN | BODY MASS INDEX: 40.44 KG/M2 | TEMPERATURE: 97.8 F | OXYGEN SATURATION: 99 % | HEART RATE: 87 BPM | WEIGHT: 206 LBS

## 2024-02-06 DIAGNOSIS — F43.0 STRESS REACTION: ICD-10-CM

## 2024-02-06 DIAGNOSIS — G25.81 RESTLESS LEG SYNDROME: ICD-10-CM

## 2024-02-06 DIAGNOSIS — M54.50 CHRONIC BILATERAL LOW BACK PAIN WITHOUT SCIATICA: Primary | ICD-10-CM

## 2024-02-06 DIAGNOSIS — M47.818 SI JOINT ARTHRITIS: ICD-10-CM

## 2024-02-06 DIAGNOSIS — G89.29 CHRONIC BILATERAL LOW BACK PAIN WITHOUT SCIATICA: Primary | ICD-10-CM

## 2024-02-06 PROCEDURE — 1160F RVW MEDS BY RX/DR IN RCRD: CPT | Performed by: FAMILY MEDICINE

## 2024-02-06 PROCEDURE — 99214 OFFICE O/P EST MOD 30 MIN: CPT | Performed by: FAMILY MEDICINE

## 2024-02-06 PROCEDURE — 3080F DIAST BP >= 90 MM HG: CPT | Performed by: FAMILY MEDICINE

## 2024-02-06 PROCEDURE — 1159F MED LIST DOCD IN RCRD: CPT | Performed by: FAMILY MEDICINE

## 2024-02-06 PROCEDURE — 3075F SYST BP GE 130 - 139MM HG: CPT | Performed by: FAMILY MEDICINE

## 2024-02-06 RX ORDER — PRAMIPEXOLE DIHYDROCHLORIDE 1 MG/1
1 TABLET ORAL NIGHTLY
Qty: 90 TABLET | Refills: 1 | Status: SHIPPED | OUTPATIENT
Start: 2024-02-06

## 2024-02-06 NOTE — PROGRESS NOTES
"Chief Complaint  Back Pain (Middle low back pain is not getting better still doing PT no better at all  not goping down to legs med are helping  but not has much )    Subjective        Marycarmen Gauthier presents to [facility]  Back Pain  This is a chronic problem. The current episode started more than 1 month ago. The problem occurs constantly. The problem is unchanged. The pain is present in the lumbar spine and sacro-iliac. The quality of the pain is described as stabbing. The pain radiates to the left buttock and right buttock. The pain is at a severity of 6/10. The pain is The same all the time. The symptoms are aggravated by lying down, sitting, standing and twisting. Stiffness is present All day. Pertinent negatives include no abdominal pain, bladder incontinence, bowel incontinence, chest pain, dysuria, fever, leg pain, numbness, paresthesias, pelvic pain, perianal numbness, tingling, weakness or weight loss. Risk factors include history of steroid use and obesity.   She has failed conservative therapy with muscle relaxers, steroids and physical therapy    In the interim she had 21 days of steroids with RSV, and then precipitated a HSV flair, her mother in law passed away, and her son is refusing to go to school due to a bully.    Her stress is a 10, she is nauseous and stressed.  She is only eating in the evening.  Still on Prozac, seeing her psychiatrist.    Objective   Vital Signs:  /90   Pulse 87   Temp 97.8 °F (36.6 °C)   Ht 152.4 cm (60\")   Wt 93.4 kg (206 lb)   SpO2 99%   BMI 40.23 kg/m²   Estimated body mass index is 40.23 kg/m² as calculated from the following:    Height as of this encounter: 152.4 cm (60\").    Weight as of this encounter: 93.4 kg (206 lb).          Physical Exam  Vitals and nursing note reviewed.   Constitutional:       General: She is not in acute distress.     Appearance: She is well-developed.   HENT:      Head: Normocephalic.      Nose: " Nose normal.   Cardiovascular:      Rate and Rhythm: Normal rate and regular rhythm.      Heart sounds: Normal heart sounds. No murmur heard.  Pulmonary:      Effort: Pulmonary effort is normal. No respiratory distress.      Breath sounds: Normal breath sounds.   Musculoskeletal:         General: Normal range of motion.   Skin:     General: Skin is warm and dry.      Findings: No rash.   Neurological:      Mental Status: She is alert and oriented to person, place, and time.   Psychiatric:         Behavior: Behavior normal.         Thought Content: Thought content normal.         Judgment: Judgment normal.        Result Review :    The following data was reviewed by: Hellen Lagunas MD on 02/06/2024:        XR Spine Lumbar AP & Lateral (10/24/2023)          Assessment and Plan     Diagnoses and all orders for this visit:    1. Chronic bilateral low back pain without sciatica (Primary)  -     MRI Lumbar Spine Without Contrast; Future  -     Ambulatory Referral to Pain Management    2. SI joint arthritis  -     MRI Lumbar Spine Without Contrast; Future  -     Ambulatory Referral to Pain Management    3. Restless leg syndrome  -     pramipexole (MIRAPEX) 1 MG tablet; Take 1 tablet by mouth Every Night.  Dispense: 90 tablet; Refill: 1    4. Stress reaction    Very pleasant 39-year-old female here to follow-up for chronic lower back pain that has been present since October not improving, new symptoms started in the lower back but I wonder if is actually in the SI joints.  I do think further imaging of the lumbar spine will be indicated.  She does have a history of EDS.  She still doing PT, paused Pilates.  Discussed water aerobics as long as her heart rate does not go too high which has given trouble with POTS in the past.  Continue with Lyrica.  Refer to pain management.    Restless leg syndrome not well-controlled increase pramipexole from 0.5 to 1 mg nightly.    Lots of stress, see above.  Bless her heart.  She really  has been through it this last month.  Continue Prozac, she does see a psychiatrist so I will prescribe medication at this time but I do think considering Wellbutrin or another option that her psychiatrist thinks would be better for her.  If it is possible to wean off Prozac I think that would help with some of the weight gain.         Follow Up     Return if symptoms worsen or fail to improve, for Continue follow-up as scheduled.  Patient was given instructions and counseling regarding her condition or for health maintenance advice. Please see specific information pulled into the AVS if appropriate.

## 2024-02-19 ENCOUNTER — TELEPHONE (OUTPATIENT)
Dept: PAIN MEDICINE | Facility: CLINIC | Age: 39
End: 2024-02-19
Payer: MEDICARE

## 2024-02-20 ENCOUNTER — OFFICE VISIT (OUTPATIENT)
Dept: PAIN MEDICINE | Facility: CLINIC | Age: 39
End: 2024-02-20
Payer: MEDICARE

## 2024-02-20 ENCOUNTER — PREP FOR SURGERY (OUTPATIENT)
Dept: SURGERY | Facility: SURGERY CENTER | Age: 39
End: 2024-02-20
Payer: MEDICARE

## 2024-02-20 VITALS
OXYGEN SATURATION: 98 % | DIASTOLIC BLOOD PRESSURE: 90 MMHG | HEIGHT: 60 IN | TEMPERATURE: 97.1 F | BODY MASS INDEX: 39.97 KG/M2 | SYSTOLIC BLOOD PRESSURE: 137 MMHG | WEIGHT: 203.6 LBS | HEART RATE: 98 BPM | RESPIRATION RATE: 18 BRPM

## 2024-02-20 DIAGNOSIS — G89.29 CHRONIC BILATERAL LOW BACK PAIN WITHOUT SCIATICA: ICD-10-CM

## 2024-02-20 DIAGNOSIS — M53.3 SACROILIAC JOINT DYSFUNCTION OF BOTH SIDES: Primary | ICD-10-CM

## 2024-02-20 DIAGNOSIS — M54.50 CHRONIC BILATERAL LOW BACK PAIN WITHOUT SCIATICA: ICD-10-CM

## 2024-02-20 DIAGNOSIS — M47.816 LUMBAR FACET ARTHROPATHY: ICD-10-CM

## 2024-02-20 PROCEDURE — 1125F AMNT PAIN NOTED PAIN PRSNT: CPT | Performed by: NURSE PRACTITIONER

## 2024-02-20 PROCEDURE — 1159F MED LIST DOCD IN RCRD: CPT | Performed by: NURSE PRACTITIONER

## 2024-02-20 PROCEDURE — 3080F DIAST BP >= 90 MM HG: CPT | Performed by: NURSE PRACTITIONER

## 2024-02-20 PROCEDURE — 99214 OFFICE O/P EST MOD 30 MIN: CPT | Performed by: NURSE PRACTITIONER

## 2024-02-20 PROCEDURE — 3075F SYST BP GE 130 - 139MM HG: CPT | Performed by: NURSE PRACTITIONER

## 2024-02-20 PROCEDURE — 1160F RVW MEDS BY RX/DR IN RCRD: CPT | Performed by: NURSE PRACTITIONER

## 2024-02-20 RX ORDER — FLUTICASONE PROPIONATE AND SALMETEROL 50; 250 UG/1; UG/1
POWDER RESPIRATORY (INHALATION)
COMMUNITY
Start: 2023-11-30

## 2024-02-20 RX ORDER — BUPROPION HYDROCHLORIDE 150 MG/1
150 TABLET ORAL DAILY
COMMUNITY
Start: 2024-02-06 | End: 2024-05-06

## 2024-02-20 RX ORDER — LEVALBUTEROL TARTRATE 45 UG/1
AEROSOL, METERED ORAL
COMMUNITY
Start: 2023-12-08

## 2024-02-20 RX ORDER — FLUCONAZOLE 150 MG/1
TABLET ORAL
COMMUNITY
Start: 2023-11-01

## 2024-02-20 RX ORDER — NYSTATIN AND TRIAMCINOLONE ACETONIDE 100000; 1 [USP'U]/G; MG/G
OINTMENT TOPICAL
COMMUNITY
Start: 2023-08-25

## 2024-02-20 RX ORDER — DIAZEPAM 5 MG/1
10 TABLET ORAL ONCE
OUTPATIENT
Start: 2024-02-20 | End: 2024-02-20

## 2024-02-20 RX ORDER — CHOLECALCIFEROL (VITAMIN D3) 100000/G
POWDER (GRAM) MISCELLANEOUS
COMMUNITY

## 2024-02-20 RX ORDER — LIDOCAINE HYDROCHLORIDE 30 MG/G
1 CREAM TOPICAL
COMMUNITY
Start: 2024-01-05

## 2024-02-20 NOTE — PROGRESS NOTES
CHIEF COMPLAINT  Back pain  Joint pain  Patient pain got bad in October, patient pain is in her low back bilateral across, patient did MRI and xray on file. Patient describe her pain as dull and achy and constantly there, patient stated that she find herself moving back and fort for comfort. Patient took muscle relaxant and pain pills which helps with sleep, patient did PT with little relief. Patient pain stays in the same area and does not radiate. Patient stated that she had been to pm for cervical issues in the past, she stated that she got a shot that did not help.     Subjective   Marycarmen Gauthier is a 39 y.o. female.   She presents to the office for evaluation of M54.50,G89.29 (ICD-10-CM) - Chronic bilateral low back pain without sciatica M47.818 (ICD-10-CM) - SI joint arthritis . She was referred here by Hellen Lagunas MD.     Ms. Gauthier back pain started over a decade ago. She states that when she was pregnant 11 years ago she developed a little back pain which has been intermittent. In October 2023 she began waking up with low back pain. She was evaluated by her PCP who referred her to PT. She states PT did not provide relief. Her low back pain is interfering with her ability to participate in Pilates.     Today her pain is 2/10VAS in severity. She describes her pain as a continuous dull aching pain located in over her lumbar spine/SI joints with no radicular pain. Her pain is worsened by prolonged position (sitting, standing, lying). Her pain does not worsen with walking, bending, or twisting; it is improved by position change temporarily, her pain will then return. She was on Tramadol and then Tylenol #3 which decreased her pain and allowed her to sleep. She is utilizing OTC ibuprofen and Tylenol as well as voltaren gel and lidocaine patches. She will utilize ice for her pain which is helpful. She will use heat intermittently, but ice is more helpful.     Past pain medications: Flexeril 5 mg TID PRN      Current pain medications: OTC Tylenol and Ibuprofen (no relief), Voltaren gel (no relief), Lidocaine patches (helpful).      Past therapies:  Physical Therapy: Yes, no relief  Chiropractor: Years ago, not for this current pain  Massage Therapy: No, she is getting a massage today  TENS: Has utilized in the past, has not used for this.   Neck or back surgery: None  Past pain management: Yes, unsure of which clinic (this was years ago)     Previous Injections:   HAMLET--no relief    Back Pain  This is a chronic problem. The current episode started more than 1 month ago. The problem occurs constantly. The problem has been worse since onset. The pain is present in the sacro-iliac and lumbar spine. The quality of the pain is described as aching (dull). The pain does not radiate. The pain is at a severity of 2/10. The symptoms are aggravated by position. Pertinent negatives include no dysuria. She has tried heat, ice, muscle relaxant, NSAIDs and analgesics (PT) for the symptoms.      PEG Assessment   What number best describes your pain on average in the past week?4  What number best describes how, during the past week, pain has interfered with your enjoyment of life?7  What number best describes how, during the past week, pain has interfered with your general activity?  7      Current Outpatient Medications:     acetaminophen-codeine (TYLENOL with CODEINE #3) 300-30 MG per tablet, Take 1 tablet by mouth Every 6 (Six) Hours As Needed for Moderate Pain., Disp: 30 tablet, Rfl: 3    Advair Diskus 250-50 MCG/ACT DISKUS, , Disp: , Rfl:     ALBUTEROL SULFATE HFA IN, Inhale As Needed., Disp: , Rfl:     azelastine (ASTELIN) 0.1 % nasal spray, , Disp: , Rfl:     buPROPion XL (WELLBUTRIN XL) 150 MG 24 hr tablet, Take 1 tablet by mouth Daily., Disp: , Rfl:     Cholecalciferol (Vitamin D3) 450708 UNIT/GM powder, Use., Disp: , Rfl:     clobetasol (TEMOVATE) 0.05 % ointment, 2 (Two) Times a Day., Disp: , Rfl:     cycloSPORINE (RESTASIS)  0.05 % ophthalmic emulsion, 1 drop 2 (Two) Times a Day., Disp: , Rfl:     diclofenac (VOLTAREN) 1 % gel gel, Apply 4 g topically to the appropriate area as directed 4 (Four) Times a Day As Needed., Disp: , Rfl:     Fexofenadine HCl (ALLEGRA ALLERGY PO), Take 180 mg by mouth As Needed., Disp: , Rfl:     fluconazole (DIFLUCAN) 150 MG tablet, , Disp: , Rfl:     FLUoxetine (PROzac) 40 MG capsule, Take 1 capsule by mouth Daily., Disp: , Rfl:     guanFACINE (TENEX) 2 MG tablet, Take 1 tablet by mouth Daily., Disp: , Rfl:     L-THEANINE PO, Take 200 mg by mouth Every Night., Disp: , Rfl:     levalbuterol (XOPENEX HFA) 45 MCG/ACT inhaler, , Disp: , Rfl:     lidocaine 3 % cream cream, Apply 1 drop topically to the appropriate area as directed., Disp: , Rfl:     mometasone (NASONEX) 50 MCG/ACT nasal spray, , Disp: , Rfl:     montelukast (SINGULAIR) 10 MG tablet, TAKE ONE TABLET BY MOUTH EVERY NIGHT AT BEDTIME, Disp: 90 tablet, Rfl: 1    nystatin-triamcinolone (MYCOLOG) 607175-3.1 UNIT/GM-% ointment, , Disp: , Rfl:     omeprazole (priLOSEC) 20 MG capsule, Take 1 capsule by mouth Daily. Indications: Gastroesophageal Reflux Disease, Disp: , Rfl:     pramipexole (MIRAPEX) 1 MG tablet, Take 1 tablet by mouth Every Night., Disp: 90 tablet, Rfl: 1    pregabalin (LYRICA) 75 MG capsule, Take 1 capsule by mouth Daily., Disp: , Rfl:     PreviDent 5000 Sensitive 1.1-5 % paste, Daily., Disp: , Rfl:     rizatriptan (Maxalt) 10 MG tablet, Take 1 tablet by mouth 1 (One) Time As Needed for Migraine. May repeat in 2 hours if needed, Disp: 30 tablet, Rfl: 1    vitamin B-12 (CYANOCOBALAMIN) 100 MCG tablet, Take 3 tablets by mouth Daily., Disp: , Rfl:     The following portions of the patient's history were reviewed and updated as appropriate: allergies, current medications, past family history, past medical history, past social history, past surgical history, and problem list.      REVIEW OF PERTINENT MEDICAL DATA          Review of Systems  "  Constitutional:  Positive for activity change.   Gastrointestinal:  Negative for constipation and diarrhea.   Genitourinary:  Negative for difficulty urinating and dysuria.   Psychiatric/Behavioral:  Positive for agitation and sleep disturbance. Negative for suicidal ideas.      --  The aforementioned information the Chief Complaint section and above subjective data including any HPI data, and also the Review of Systems data, has been personally reviewed and affirmed.  --    Vitals:    02/20/24 0805   BP: 137/90   BP Location: Left arm   Patient Position: Sitting   Cuff Size: Large Adult   Pulse: 98   Resp: 18   Temp: 97.1 °F (36.2 °C)   SpO2: 98%   Weight: 92.4 kg (203 lb 9.6 oz)   Height: 152.4 cm (60\")   PainSc:   2     Objective   Physical Exam  Vitals and nursing note reviewed.   Constitutional:       Appearance: Normal appearance. She is well-developed.   Eyes:      General: Lids are normal.   Cardiovascular:      Rate and Rhythm: Normal rate.   Pulmonary:      Effort: Pulmonary effort is normal.   Musculoskeletal:      Lumbar back: Tenderness and bony tenderness present. Decreased range of motion. Negative right straight leg raise test and negative left straight leg raise test.      Comments:   +Shay SI compression  +Shay Citlaly  +Shay Gaenslen's  - Shay Thigh Thrust  - Shay SI Distraction  +Lumbar facet loading   Neurological:      Mental Status: She is alert and oriented to person, place, and time.      Gait: Gait normal.   Psychiatric:         Attention and Perception: Attention normal.         Mood and Affect: Mood normal.         Speech: Speech normal.         Behavior: Behavior normal.         Judgment: Judgment normal.       Assessment & Plan   Diagnoses and all orders for this visit:    1. Sacroiliac joint dysfunction of both sides (Primary)    2. Chronic bilateral low back pain without sciatica    3. Lumbar facet arthropathy      --- Marycarmen Gauthier reports a pain score of 2.  Given her pain assessment " as noted, treatment options were discussed and the following options were decided upon as a follow-up plan to address the patient's pain: steroid injections.    --- Bilateral SI joint injections   Reviewed the procedure at length with the patient.  Included in the review was expectations, complications, risk and benefits.The procedure was described in detail and the risks, benefits and alternatives were discussed with the patient (including but not limited to: bleeding, infection, nerve damage, worsening of pain, inability to perform injection, paralysis, seizures, coma, no pain relief and death) who agreed to proceed.  Discussed the potential for sedation if warranted/wanted.  The procedure will plan to be performed at [facility] with fluoroscopic guidance(unless ultrasound is indicated) and could potentially have steroids and contrast dye used. Questions were answered and in a way the patient could understand.  Patient verbalized understanding and wishes to proceed.  This intervention will be ordered.  Discussed with patient that all procedures are part of a multimodal plan of care and include either formal PT or a home exercise program.  Patient has no evidence of coagulopathy or current infection.    --- Follow-up after procedure     VERNELL REPORT    As the clinician, I personally reviewed the VERNELL from 2/20/2024 while the patient was in the office today.    Dictated utilizing Dragon dictation.

## 2024-02-22 ENCOUNTER — PATIENT MESSAGE (OUTPATIENT)
Dept: FAMILY MEDICINE CLINIC | Facility: CLINIC | Age: 39
End: 2024-02-22
Payer: MEDICARE

## 2024-02-22 ENCOUNTER — TRANSCRIBE ORDERS (OUTPATIENT)
Dept: SURGERY | Facility: SURGERY CENTER | Age: 39
End: 2024-02-22
Payer: MEDICARE

## 2024-02-22 DIAGNOSIS — Z41.9 SURGERY, ELECTIVE: Primary | ICD-10-CM

## 2024-02-22 PROBLEM — M53.3 SACROILIAC JOINT DYSFUNCTION OF BOTH SIDES: Status: ACTIVE | Noted: 2024-02-20

## 2024-02-22 NOTE — TELEPHONE ENCOUNTER
From: Marycarmen Gauthier  To: Hellen Lagunas  Sent: 2/22/2024 10:00 AM EST  Subject: Disability Physician Statement     Hi,  Long term disability called and said they need a new physician statement. Please have the office let know if there is a charge.   I dropped off the long term disability form in a folder with the  just now. I put your past completed form in the folder to reference.   I’ll come  once completed.   Thanks,  Marycarmen

## 2024-02-22 NOTE — TELEPHONE ENCOUNTER
said she would review paperwork, but pt would likely have to take elsewhere as our office does not do long-term disability

## 2024-03-25 NOTE — H&P
[facility]   HISTORY AND PHYSICAL    Patient Name: Marycarmen Gauthier  : 1985  MRN: [id number]  Primary Care Physician:  Hellen Lagunas MD  Date of admission: 3/27/2024    Subjective   Subjective     Chief Complaint: Low back pain    History of Present Illness  Chronic pain in the bilateral hip area/low back region.       Review of Systems   Constitutional:  Negative for chills and fever.   Respiratory:  Negative for cough and shortness of breath.    Musculoskeletal:  Positive for back pain.        Personal History     Past Medical History:   Diagnosis Date    Allergic 1986    Anxiety     Asthma     Autonomic dysfunction 2017    Bulging lumbar disc     Chronic pain     Chronic sinus infection     Coronary artery disease     chris-danlos syndrome    Depression     Dizziness     VICKERS (dyspnea on exertion)     Essential hypertension     Fatigue     Fibromyalgia     Fibromyalgia, primary 2015    Flexor hallucis longus tendinitis 2013    GERD (gastroesophageal reflux disease) 1998    H/O Bilateral carpal tunnel syndrome     H/O transfusion of packed red blood cells     History of abnormal cervical Pap smear     History of anemia     History of infectious mononucleosis     History of medical problems     Lichen Sclerosus, Hypermobility, herpes virus    History of tachycardia     POTS    Hyperlipidemia     Hypermobile joint syndrome of multiple sites     Hypermobile joints     Irritable bowel syndrome     Kidney stone     History of 2    Leukocytosis     Lyme disease 2019    Formatting of this note might be different from the original. Chronic    Numbness and tingling in both hands     Obesity     Osteoarthritis     Palpitations     POTS (postural orthostatic tachycardia syndrome) 2017    PROM (premature rupture of membranes)     PTSD (post-traumatic stress disorder) 2014    Seasonal allergies     Sleep apnea 2022    Doc ordered auto sleep sense    Snoring      SOB (shortness of breath)     Syncope and collapse     Visual impairment 2014    SICCA       Past Surgical History:   Procedure Laterality Date    ANKLE SURGERY Right 2014    FHL tendon release    APPENDECTOMY  2019    incisions took long time to close    BREAST SURGERY  10/15/2013    Reduction    CARPAL TUNNEL RELEASE Right 2017    REVISE MEDIAN N/CARPAL TUNNEL SURG     SECTION  2012    CHOLECYSTECTOMY  2019    post op infection    COLONOSCOPY  2017    HYSTERECTOMY  2018    JOINT REPLACEMENT      Rotator cuff and fhl tendon release (right sides)    LAPAROSCOPIC CHOLECYSTECTOMY W/ CHOLANGIOGRAPHY      ROTATOR CUFF REPAIR Right 2009    SINUS SURGERY  2005    WISDOM TOOTH EXTRACTION         Family History: family history includes Anemia in her paternal grandmother; Aneurysm in her maternal grandfather and mother; Anxiety disorder in her paternal grandmother and sister; Arthritis in her paternal grandfather and paternal grandmother; Cancer in her paternal aunt; Cancer (age of onset: 27) in her sister; Cancer (age of onset: 61) in her father; Colon cancer in her maternal grandmother; Depression in her maternal grandmother, paternal grandmother, and sister; Heart attack in her maternal grandfather; Heart disease in her maternal grandmother and paternal grandfather; Hyperlipidemia in her maternal grandmother and paternal grandfather; Hypertension in her maternal grandmother and mother; Lymphoma (age of onset: 27) in her sister; Lymphoma (age of onset: 61) in her father; Mental illness in her paternal grandmother; Obesity in her father, mother, and sister; Other in her mother; Stroke in her maternal grandmother; Sudden death in her maternal grandfather. Otherwise pertinent FHx was reviewed and not pertinent to current issue.    Social History:  reports that she has never smoked. She has never been exposed to tobacco smoke. She has never used smokeless tobacco. She reports that she does not  currently use alcohol. She reports that she does not use drugs.    Home Medications:  Beclomethasone Diprop HFA, Cholecalciferol, FLUoxetine, Fexofenadine HCl, Sod Fluoride-Potassium Nitrate, Theanine, acetaminophen-codeine, azelastine, buPROPion XL, clobetasol, cycloSPORINE, diclofenac, guanFACINE, levalbuterol, lidocaine, mometasone, montelukast, omeprazole, pramipexole, pregabalin, rizatriptan, and vitamin B-12    Allergies:  Allergies   Allergen Reactions    Amlodipine Other (See Comments)     Edema and weight gain    Diphenhydramine Other (See Comments)     Agitated, and restless    Hydrocodone-Acetaminophen Itching and Rash    Latex Rash    Latex, Natural Rubber Rash    Sulfa Antibiotics Rash    Sulfamethoxazole-Trimethoprim Itching and Rash       Objective    Objective     Vitals:        Physical Exam  Constitutional:       General: She is not in acute distress.  Pulmonary:      Effort: Pulmonary effort is normal. No respiratory distress.   Skin:     General: Skin is warm and dry.   Neurological:      Mental Status: She is alert.   Psychiatric:         Mood and Affect: Mood normal.         Thought Content: Thought content normal.         Result Review    Result Review:  I have personally reviewed the results from the time of this admission to 3/27/2024 12:49 EDT and agree with these findings:  []  Laboratory list / accordion  []  Microbiology  []  Radiology  []  EKG/Telemetry   []  Cardiology/Vascular   []  Pathology  []  Old records  []  Other:  Most notable findings include: No new       Assessment & Plan   Assessment / Plan     Brief Patient Summary:  Marycarmen Gauthier is a 39 y.o. female who has chronic low back/sacroiliac joint region pain.    Active Hospital Problems:  Active Hospital Problems    Diagnosis     **Sacroiliac joint dysfunction of both sides      Plan: Bilateral sacroiliac joint injection      DVT prophylaxis:  No DVT prophylaxis order currently exists.    Melissa Kerr MD

## 2024-03-25 NOTE — DISCHARGE INSTRUCTIONS
[facility] [place] [facility] - Post-procedure Instructions          --  While there are no absolute restrictions, it is recommended that you do not perform strenuous activity today. In the morning, you may resume your level of activity as before your block.    --  If you have a band-aid at your injection site, please remove it later today. Observe the area for any redness, swelling, pus-like drainage, or a temperature over 101°. If any of these symptoms occur, please call your doctor at [phone]. If after office hours, leave a message and the on-call provider will return your call.    --  Ice may be applied to your injection site. It is recommended you avoid direct heat (heating pad; hot tub) for 1-2 days.    --  Call [facility] [place] at [phone] if you experience persistent headache, persistent bleeding from the injection site, or severe pain not relieved by heat or oral medication.    --  Do not make important decisions today.    --  Due to the effects of the block and/or the I.V. Sedation, DO NOT drive or operate hazardous machinery for 12 hours.  Local anesthetics may cause numbness after procedure and precautions must be taken with regards to operating equipment as well as with walking, even if ambulating with assistance of another person or with an assistive device.    --  Do not drink alcohol for 12 hours.    -- You may return to work tomorrow, or as directed by your referring doctor.    --  Occasionally you may notice a slight increase in your pain after the procedure. This should start to improve within the next 24-48 hours. Radiofrequency ablation procedure pain may last 3-4 weeks.    --  It may take as long as 3-4 days before you notice a gradual improvement in your pain and/or other symptoms.    -- You may continue to take your prescribed pain medication as needed.    --  Some normal possible side effects of steroid use could include fluid retention, increased blood sugar, dull headache,  increased sweating, increased appetite, mood swings and flushing.    --  Diabetics are recommended to watch their blood glucose level closely for 24-48 hours after the injection.    --  Must stay in PACU for 20 min upon arrival and prove no leg weakness before being discharged.    --  IN THE EVENT OF A LIFE THREATENING EMERGENCY, (CHEST PAIN, BREATHING DIFFICULTIES, PARALYSIS…) YOU SHOULD GO TO YOUR NEAREST EMERGENCY ROOM.    --  You should be contacted by our office within 2-3 days to schedule follow up or next appointment date.  If not contacted within 7 days, please call the office at [phone]

## 2024-03-27 ENCOUNTER — HOSPITAL ENCOUNTER (OUTPATIENT)
Dept: GENERAL RADIOLOGY | Facility: SURGERY CENTER | Age: 39
Setting detail: HOSPITAL OUTPATIENT SURGERY
End: 2024-03-27
Payer: MEDICARE

## 2024-03-27 ENCOUNTER — HOSPITAL ENCOUNTER (OUTPATIENT)
Facility: SURGERY CENTER | Age: 39
Setting detail: HOSPITAL OUTPATIENT SURGERY
Discharge: HOME | End: 2024-03-27
Attending: ANESTHESIOLOGY | Admitting: ANESTHESIOLOGY
Payer: MEDICARE

## 2024-03-27 VITALS
TEMPERATURE: 97.2 F | RESPIRATION RATE: 16 BRPM | WEIGHT: 200 LBS | BODY MASS INDEX: 39.27 KG/M2 | SYSTOLIC BLOOD PRESSURE: 131 MMHG | DIASTOLIC BLOOD PRESSURE: 93 MMHG | OXYGEN SATURATION: 99 % | HEART RATE: 76 BPM | HEIGHT: 60 IN

## 2024-03-27 DIAGNOSIS — M53.3 SACROILIAC JOINT DYSFUNCTION OF BOTH SIDES: ICD-10-CM

## 2024-03-27 DIAGNOSIS — Z41.9 SURGERY, ELECTIVE: ICD-10-CM

## 2024-03-27 PROCEDURE — G0260 INJ FOR SACROILIAC JT ANESTH: HCPCS | Performed by: ANESTHESIOLOGY

## 2024-03-27 PROCEDURE — 77002 NEEDLE LOCALIZATION BY XRAY: CPT

## 2024-03-27 PROCEDURE — 25010000002 DEXAMETHASONE SODIUM PHOSPHATE 100 MG/10ML SOLUTION 10 ML VIAL: Performed by: ANESTHESIOLOGY

## 2024-03-27 PROCEDURE — 25510000001 IOPAMIDOL 61 % SOLUTION 30 ML VIAL: Performed by: ANESTHESIOLOGY

## 2024-03-27 RX ORDER — DIAZEPAM 5 MG/1
10 TABLET ORAL ONCE
Status: COMPLETED | OUTPATIENT
Start: 2024-03-27 | End: 2024-03-27

## 2024-03-27 RX ADMIN — DIAZEPAM 10 MG: 5 TABLET ORAL at 13:02

## 2024-03-27 NOTE — OP NOTE
Bilateral Sacroiliac Joint Injection  [facility]      PREOPERATIVE DIAGNOSIS:   Sacroiliac joint dysfunction    POSTOPERATIVE DIAGNOSIS:  Same    PROCEDURE:  Sacroiliac Joint Injection, with fluoroscopic guidance CPT 07410 -50    PRE-PROCEDURE DISCUSSION WITH PATIENT:    Risks and complications were discussed with the patient prior to starting the procedure and informed consent was obtained.  We discussed various topics including but not limited to bleeding, infection, injury, postprocedural site soreness, painful flareup, worsening of clinical picture, paralysis, coma, and death.     SURGEON:  Melissa Kerr MD    REASON FOR PROCEDURE:    Patient has pain consistent with SI pathology on history and physical exam.    SEDATION:  Anxiolysis was used for this procedure which included PO Valium 10mg  ANESTHETIC AGENT:  1% Lidocaine  STEROID AGENT:  10mg Dexamethasone    DESCRIPTON OF PROCEDURE:  After obtaining informed consent, IV access was not obtained in the preoperative area.  The patient was transported to the operative suite and placed in the prone position. EKG, blood pressure, and pulse oximeter were monitored. The lumbosacral area was prepped with Chloraprep and draped in a sterile fashion. Under fluoroscopic guidance the inferior most portion of the right SI joint was identified. The overlying skin and subcutaneous tissue was anesthetized with 1% lidocaine. A 22-gauge spinal needle was then advanced under fluoroscopic guidance in a coaxial view into the joint space.  After negative aspiration, 1 mL of Isovue 61% was injected, and after seeing appropriate spread into the joint a volume of 3ml of the above medication was injected.    Needle was removed intact.      The same procedure was then performed on the contralateral side in the exact same fashion.      Vital signs remained stable.  The onset of analgesia was noted.    Pre-procedure pain score: 2/10 at rest, 6/10 with  activity  Post-procedure pain score: 0/10      ESTIMATED BLOOD LOSS:  minimal  SPECIMENS:  None  COMPLICATIONS:  No complications were noted. and There was no indication of vascular uptake on live injection of contrast dye.    TOLERANCE & DISCHARGE CONDITION:    The patient tolerated the procedure well.  The patient was transported to the recovery area without difficulties.  The patient was discharged to home under the care of family in stable and satisfactory condition.    PLAN OF CARE:  The patient was given our standard instruction sheet and will resume all medications as per the medication reconciliation sheet.  The patient will Return to clinic 2-3 wks.  The patient is instructed to keep an account of pain relief after the procedure.

## 2024-04-10 ENCOUNTER — OFFICE VISIT (OUTPATIENT)
Dept: PAIN MEDICINE | Facility: CLINIC | Age: 39
End: 2024-04-10
Payer: MEDICARE

## 2024-04-10 VITALS
TEMPERATURE: 96.8 F | OXYGEN SATURATION: 99 % | BODY MASS INDEX: 38.99 KG/M2 | HEART RATE: 92 BPM | DIASTOLIC BLOOD PRESSURE: 71 MMHG | SYSTOLIC BLOOD PRESSURE: 108 MMHG | HEIGHT: 60 IN | WEIGHT: 198.6 LBS

## 2024-04-10 DIAGNOSIS — M54.50 CHRONIC BILATERAL LOW BACK PAIN WITHOUT SCIATICA: ICD-10-CM

## 2024-04-10 DIAGNOSIS — G89.29 CHRONIC BILATERAL LOW BACK PAIN WITHOUT SCIATICA: ICD-10-CM

## 2024-04-10 DIAGNOSIS — M53.3 SACROILIAC JOINT DYSFUNCTION OF BOTH SIDES: Primary | ICD-10-CM

## 2024-04-10 PROCEDURE — 1159F MED LIST DOCD IN RCRD: CPT | Performed by: NURSE PRACTITIONER

## 2024-04-10 PROCEDURE — 3078F DIAST BP <80 MM HG: CPT | Performed by: NURSE PRACTITIONER

## 2024-04-10 PROCEDURE — 3074F SYST BP LT 130 MM HG: CPT | Performed by: NURSE PRACTITIONER

## 2024-04-10 PROCEDURE — 1160F RVW MEDS BY RX/DR IN RCRD: CPT | Performed by: NURSE PRACTITIONER

## 2024-04-10 PROCEDURE — 99212 OFFICE O/P EST SF 10 MIN: CPT | Performed by: NURSE PRACTITIONER

## 2024-04-10 PROCEDURE — 1125F AMNT PAIN NOTED PAIN PRSNT: CPT | Performed by: NURSE PRACTITIONER

## 2024-04-10 RX ORDER — TRAMADOL HYDROCHLORIDE 50 MG/1
50 TABLET ORAL EVERY 6 HOURS PRN
COMMUNITY
Start: 2024-03-28

## 2024-04-10 NOTE — PROGRESS NOTES
"CHIEF COMPLAINT  Back pain    Subjective   Marycarmen Gauthier is a 39 y.o. female  who presents to the office for follow-up of procedure.  She completed a Bilateral sacroiliac joint injection  on  3/27/24 performed by Dr. Kerr for management of low back pain. Patient reports temporary relief at at lest 75% with ONGOING 65% relief from the procedure. She notes that she has not needed to use her lidocaine patches or tramadol/Tylenol #3 (managed by PCP).     Today her pain is 2/10VAS in severity. She describes her pain as aching pain and that her back feels \"loose\". She states that when she moves she will feel grinding and popping in her back. She notes that her pain is significantly improved.     Past pain medications: Flexeril 5 mg TID PRN     Current pain medications: OTC Tylenol and Ibuprofen (no relief), Voltaren gel (no relief), Lidocaine patches (helpful).      Past therapies:  Physical Therapy: Yes, no relief  Chiropractor: Years ago, not for this current pain  Massage Therapy: No, she is getting a massage today  TENS: Has utilized in the past, has not used for this.   Neck or back surgery: None  Past pain management: Yes, unsure of which clinic (this was years ago)     Previous Injections:   HAMLET--no relief    Back Pain  This is a chronic problem. The current episode started more than 1 month ago. The problem occurs constantly. Progression since onset: improved since last office visit. The pain is present in the sacro-iliac and lumbar spine. The quality of the pain is described as aching (dull). The pain does not radiate. The pain is at a severity of 2/10. The symptoms are aggravated by position. Pertinent negatives include no dysuria. She has tried heat, ice, muscle relaxant, NSAIDs and analgesics (PT) for the symptoms.      PEG Assessment   What number best describes your pain on average in the past week?4  What number best describes how, during the past week, pain has interfered with your enjoyment of " "life?8  What number best describes how, during the past week, pain has interfered with your general activity?  8    The following portions of the patient's history were reviewed and updated as appropriate: allergies, current medications, past family history, past medical history, past social history, past surgical history, and problem list.    Review of Systems   Constitutional:  Negative for activity change.   Gastrointestinal:  Negative for constipation and diarrhea.   Genitourinary:  Negative for difficulty urinating and dysuria.   Psychiatric/Behavioral:  Positive for agitation. Negative for sleep disturbance and suicidal ideas.      --  The aforementioned information the Chief Complaint section and above subjective data including any HPI data, and also the Review of Systems data, has been personally reviewed and affirmed.  --     Vitals:    04/10/24 1300   BP: 108/71   BP Location: Left arm   Patient Position: Sitting   Cuff Size: Large Adult   Pulse: 92   Temp: 96.8 °F (36 °C)   SpO2: 99%   Weight: 90.1 kg (198 lb 9.6 oz)   Height: 152.4 cm (60\")   PainSc:   2     Objective   Physical Exam  Vitals and nursing note reviewed.   Constitutional:       Appearance: Normal appearance. She is well-developed.   Eyes:      General: Lids are normal.   Cardiovascular:      Rate and Rhythm: Normal rate.   Pulmonary:      Effort: Pulmonary effort is normal.   Neurological:      Mental Status: She is alert and oriented to person, place, and time.   Psychiatric:         Speech: Speech normal.         Behavior: Behavior normal.         Judgment: Judgment normal.       Assessment & Plan   Diagnoses and all orders for this visit:    1. Sacroiliac joint dysfunction of both sides (Primary)    2. Chronic bilateral low back pain without sciatica      Marycarmen Gauthier reports a pain score of 2.  Given her pain assessment as noted, treatment options were discussed and the following options were decided upon as a follow-up plan to " address the patient's pain: continuation of current treatment plan for pain.    --- We can repeat Bilateral SI joints injection every 3 months PRN.   --- Follow-up if pain returns/worsens. Cautioned against waiting until her pain is severe to contact our office. She states understanding.      Dictated utilizing Dragon dictation.

## 2024-04-15 ENCOUNTER — OFFICE VISIT (OUTPATIENT)
Dept: FAMILY MEDICINE CLINIC | Facility: CLINIC | Age: 39
End: 2024-04-15
Payer: MEDICARE

## 2024-04-15 VITALS
SYSTOLIC BLOOD PRESSURE: 122 MMHG | TEMPERATURE: 97.8 F | DIASTOLIC BLOOD PRESSURE: 74 MMHG | OXYGEN SATURATION: 98 % | WEIGHT: 201 LBS | HEART RATE: 95 BPM | HEIGHT: 60 IN | BODY MASS INDEX: 39.46 KG/M2

## 2024-04-15 DIAGNOSIS — Z00.00 MEDICARE ANNUAL WELLNESS VISIT, SUBSEQUENT: Primary | ICD-10-CM

## 2024-04-15 PROCEDURE — 1159F MED LIST DOCD IN RCRD: CPT | Performed by: FAMILY MEDICINE

## 2024-04-15 PROCEDURE — G0439 PPPS, SUBSEQ VISIT: HCPCS | Performed by: FAMILY MEDICINE

## 2024-04-15 PROCEDURE — 3078F DIAST BP <80 MM HG: CPT | Performed by: FAMILY MEDICINE

## 2024-04-15 PROCEDURE — 1160F RVW MEDS BY RX/DR IN RCRD: CPT | Performed by: FAMILY MEDICINE

## 2024-04-15 PROCEDURE — 3074F SYST BP LT 130 MM HG: CPT | Performed by: FAMILY MEDICINE

## 2024-04-15 PROCEDURE — 1170F FXNL STATUS ASSESSED: CPT | Performed by: FAMILY MEDICINE

## 2024-04-15 RX ORDER — METAPROTERENOL SULFATE 10 MG
TABLET ORAL
COMMUNITY
Start: 2024-04-11

## 2024-04-15 RX ORDER — MELATONIN 5 MG
TABLET,CHEWABLE ORAL
COMMUNITY
Start: 2023-04-11

## 2024-04-15 NOTE — PROGRESS NOTES
The ABCs of the Annual Wellness Visit  Subsequent Medicare Wellness Visit    Subjective      Marycarmen Gauthier is a 39 y.o. female who presents for a Subsequent Medicare Wellness Visit.    The following portions of the patient's history were reviewed and   updated as appropriate: allergies, current medications, past family history, past medical history, past social history, past surgical history, and problem list.    Compared to one year ago, the patient feels her physical   health is the same.    Compared to one year ago, the patient feels her mental   health is better.    Recent Hospitalizations:  She was not admitted to the hospital during the last year.       Current Medical Providers:  Patient Care Team:  Hellen Lagunas MD as PCP - General (Family Medicine)  Adam Carbajal MD as Consulting Physician (Hematology and Oncology)    Outpatient Medications Prior to Visit   Medication Sig Dispense Refill    acetaminophen-codeine (TYLENOL with CODEINE #3) 300-30 MG per tablet Take 1 tablet by mouth Every 6 (Six) Hours As Needed for Moderate Pain. 30 tablet 3    azelastine (ASTELIN) 0.1 % nasal spray       Beclomethasone Diprop HFA (QVAR Redihaler) 40 MCG/ACT inhaler Inhale 2 puffs 2 (Two) Times a Day.      buPROPion XL (WELLBUTRIN XL) 150 MG 24 hr tablet Take 1 tablet by mouth Daily.      Cholecalciferol (Vitamin D3) 433923 UNIT/GM powder Use.      clobetasol (TEMOVATE) 0.05 % ointment 2 (Two) Times a Day.      cycloSPORINE (RESTASIS) 0.05 % ophthalmic emulsion 1 drop 2 (Two) Times a Day.      diclofenac (VOLTAREN) 1 % gel gel Apply 4 g topically to the appropriate area as directed 4 (Four) Times a Day As Needed.      Fexofenadine HCl (ALLEGRA ALLERGY PO) Take 180 mg by mouth As Needed.      FLUoxetine (PROzac) 40 MG capsule Take 1 capsule by mouth Daily.      guanFACINE (TENEX) 2 MG tablet Take 1 tablet by mouth Daily.      L-THEANINE PO Take 200 mg by mouth Every Night.      levalbuterol (XOPENEX HFA) 45 MCG/ACT  inhaler       lidocaine 3 % cream cream Apply 1 drop topically to the appropriate area as directed.      Melatonin 5 MG chewable tablet       mometasone (NASONEX) 50 MCG/ACT nasal spray       montelukast (SINGULAIR) 10 MG tablet TAKE ONE TABLET BY MOUTH EVERY NIGHT AT BEDTIME 90 tablet 1    Omega-3 Fatty Acids (Omega-3 Fish Oil) 500 MG capsule       omeprazole (priLOSEC) 20 MG capsule Take 1 capsule by mouth Daily. Indications: Gastroesophageal Reflux Disease      pramipexole (MIRAPEX) 1 MG tablet Take 1 tablet by mouth Every Night. 90 tablet 1    pregabalin (LYRICA) 75 MG capsule Take 1 capsule by mouth Daily.      PreviDent 5000 Sensitive 1.1-5 % paste Daily.      rizatriptan (Maxalt) 10 MG tablet Take 1 tablet by mouth 1 (One) Time As Needed for Migraine. May repeat in 2 hours if needed 30 tablet 1    traMADol (ULTRAM) 50 MG tablet Take 1 tablet by mouth Every 6 (Six) Hours As Needed.      vitamin B-12 (CYANOCOBALAMIN) 100 MCG tablet Take 3 tablets by mouth Daily.       No facility-administered medications prior to visit.       Opioid medication/s are on active medication list.  and I have evaluated her active treatment plan and pain score trends (see table).  Vitals:    04/15/24 0920   PainSc: 0-No pain     I have reviewed the chart for potential of high risk medication and harmful drug interactions in the elderly.          Aspirin is not on active medication list.  Aspirin use is not indicated based on review of current medical condition/s. Risk of harm outweighs potential benefits.  .    Patient Active Problem List   Diagnosis    POTS (postural orthostatic tachycardia syndrome)    Essential hypertension    Hyperlipidemia    Bilateral numbness and tingling of arms and legs    Anxiety    Carpal tunnel syndrome on both sides    Chronic frontal sinusitis    Chronic seasonal allergic rhinitis due to pollen    VICKERS (dyspnea on exertion)    Falls    Chronic fatigue    Hypermobility syndrome    IgA deficiency,  "selective    Low back pain at multiple sites    Myalgia    Nausea    Nephrolithiasis    Nystagmus, optokinetic    Osteoarthritis    PTSD (post-traumatic stress disorder)    RAD (reactive airway disease)    Small fiber neuropathy    Tachycardia    Cervical spondylosis    DDD (degenerative disc disease), cervical    Leukocytosis    Acid reflux    Disequilibrium    Disorder of autonomic nervous system    Endometriosis    Class 2 obesity due to excess calories without serious comorbidity with body mass index (BMI) of 38.0 to 38.9 in adult    Hemorrhoid    Hypermobile joint syndrome of multiple sites    Snoring    Spinal stenosis    Lisbeth-Danlos syndrome    Arthralgia    Fatty liver disease, nonalcoholic    Candidiasis    Chronic interstitial cystitis    Lichen sclerosus of vulva    Elevated LFTs    HSV-1 infection    Chronic bilateral low back pain without sciatica    Sacroiliac joint dysfunction of both sides     Advance Care Planning   Advance Care Planning     Advance Directive is not on file.  ACP discussion was held with the patient during this visit. Patient has an advance directive (not in EMR), copy requested.     Objective    Vitals:    04/15/24 0920   BP: 122/74   Pulse: 95   Temp: 97.8 °F (36.6 °C)   SpO2: 98%   Weight: 91.2 kg (201 lb)   Height: 152.4 cm (60\")   PainSc: 0-No pain     Estimated body mass index is 39.26 kg/m² as calculated from the following:    Height as of this encounter: 152.4 cm (60\").    Weight as of this encounter: 91.2 kg (201 lb).    Physical Exam  Vitals and nursing note reviewed.   Constitutional:       General: She is not in acute distress.     Appearance: Normal appearance. She is well-developed.   HENT:      Head: Normocephalic.      Right Ear: Tympanic membrane and ear canal normal. There is no impacted cerumen.      Left Ear: Tympanic membrane and ear canal normal. There is no impacted cerumen.      Nose: Nose normal.   Eyes:      Conjunctiva/sclera: Conjunctivae normal.      " Pupils: Pupils are equal, round, and reactive to light.   Neck:      Vascular: No carotid bruit.   Cardiovascular:      Rate and Rhythm: Normal rate and regular rhythm.      Heart sounds: Normal heart sounds. No murmur heard.  Pulmonary:      Effort: Pulmonary effort is normal. No respiratory distress.      Breath sounds: Normal breath sounds.   Abdominal:      General: Abdomen is flat. Bowel sounds are normal. There is no distension.      Tenderness: There is no abdominal tenderness.   Musculoskeletal:         General: Normal range of motion.   Skin:     General: Skin is warm and dry.      Findings: No rash.   Neurological:      Mental Status: She is alert and oriented to person, place, and time.   Psychiatric:         Behavior: Behavior normal.         Thought Content: Thought content normal.         Judgment: Judgment normal.             Does the patient have evidence of cognitive impairment?   No    Lab Results   Component Value Date    CHLPL 225 (H) 04/10/2024    TRIG 246 (H) 04/10/2024    HDL 39 (L) 04/10/2024     (H) 04/10/2024    VLDL 45 (H) 04/10/2024    HGBA1C 5.80 (H) 04/10/2024      Hemoglobin A1c (04/10/2024 08:58)  Lipid Panel (04/10/2024 08:58)  CBC & Differential (04/10/2024 08:58)  Comprehensive Metabolic Panel (04/10/2024 08:58)    HEALTH RISK ASSESSMENT    Smoking Status:  Social History     Tobacco Use   Smoking Status Never    Passive exposure: Never   Smokeless Tobacco Never   Tobacco Comments    CAFFEINE USE     Alcohol Consumption:  Social History     Substance and Sexual Activity   Alcohol Use Not Currently    Comment: 1 drink every few months socially     Fall Risk Screen:    STEADI Fall Risk Assessment was completed, and patient is at LOW risk for falls.Assessment completed on:4/15/2024    Depression Screenin/15/2024     9:25 AM   PHQ-2/PHQ-9 Depression Screening   Little Interest or Pleasure in Doing Things 0-->not at all   Feeling Down, Depressed or Hopeless 0-->not at  all   PHQ-9: Brief Depression Severity Measure Score 0       Health Habits and Functional and Cognitive Screenin/15/2024     9:26 AM   Functional & Cognitive Status   Do you have difficulty preparing food and eating? No   Do you have difficulty bathing yourself, getting dressed or grooming yourself? No   Do you have difficulty using the toilet? No   Do you have difficulty moving around from place to place? No   Do you have trouble with steps or getting out of a bed or a chair? No   Current Diet Well Balanced Diet   Dental Exam Up to date   Eye Exam Up to date   Exercise (times per week) 3 times per week   Current Exercises Include Walking   Do you need help using the phone?  No   Are you deaf or do you have serious difficulty hearing?  No   Do you need help to go to places out of walking distance? No   Do you need help shopping? No   Do you need help preparing meals?  No   Do you need help with housework?  No   Do you need help with laundry? No   Do you need help taking your medications? No   Do you need help managing money? No   Do you ever drive or ride in a car without wearing a seat belt? No   Have you felt unusual stress, anger or loneliness in the last month? No   Who do you live with? Spouse   If you need help, do you have trouble finding someone available to you? No   Have you been bothered in the last four weeks by sexual problems? No   Do you have difficulty concentrating, remembering or making decisions? No       Age-appropriate Screening Schedule:  Refer to the list below for future screening recommendations based on patient's age, sex and/or medical conditions. Orders for these recommended tests are listed in the plan section. The patient has been provided with a written plan.    Health Maintenance   Topic Date Due    COVID-19 Vaccine (2023- season) 10/27/2023    INFLUENZA VACCINE  2024    BMI FOLLOWUP  2024    LIPID PANEL  04/10/2025    ANNUAL WELLNESS VISIT  04/15/2025     PAP SMEAR  01/31/2027    TDAP/TD VACCINES (2 - Td or Tdap) 09/01/2033    HEPATITIS C SCREENING  Completed    Pneumococcal Vaccine 0-64  Completed                  CMS Preventative Services Quick Reference  Risk Factors Identified During Encounter:    Dental Screening Recommended  Vision Screening Recommended    The above risks/problems have been discussed with the patient.  Pertinent information has been shared with the patient in the After Visit Summary.    Diagnoses and all orders for this visit:    1. Medicare annual wellness visit, subsequent (Primary)    Here for annual exam, fasting labs reviewed with patient showing mild hyperglycemia again but not to GLP-1's due to strong response, immunizations up-to-date, colon cancer screening negative for symptoms, up-to-date, GYN health up-to-date, cardiovascular screening negative for symptoms, counseled patient to increase vegetable intake, water and 150 minutes of exercise weekly combining weightbearing exercises and aerobic activity.    She is also filing for disability for POTS, Lisbeth-Danlos and issues with syncope/chronic fatigue, this was made by a specialist at [facility].  We discussed that some of the answers on the paperwork I am not able to answer and would be better handled by her specialist.  However I do agree that she qualifies for disability, especially with flying back to some of my initial notes when she first established with me.    Follow Up:   Next Medicare Wellness visit to be scheduled in 1 year.    Return in about 6 months (around 10/15/2024), or if symptoms worsen or fail to improve, for Recheck hyperglycemia with labs prior .    An After Visit Summary and PPPS were made available to the patient.

## 2024-05-10 ENCOUNTER — TRANSCRIBE ORDERS (OUTPATIENT)
Dept: SURGERY | Facility: SURGERY CENTER | Age: 39
End: 2024-05-10
Payer: MEDICARE

## 2024-05-10 ENCOUNTER — PREP FOR SURGERY (OUTPATIENT)
Dept: SURGERY | Facility: SURGERY CENTER | Age: 39
End: 2024-05-10
Payer: MEDICARE

## 2024-05-10 ENCOUNTER — OFFICE VISIT (OUTPATIENT)
Dept: PAIN MEDICINE | Facility: CLINIC | Age: 39
End: 2024-05-10
Payer: MEDICARE

## 2024-05-10 VITALS
HEART RATE: 94 BPM | DIASTOLIC BLOOD PRESSURE: 90 MMHG | OXYGEN SATURATION: 100 % | RESPIRATION RATE: 18 BRPM | BODY MASS INDEX: 38.68 KG/M2 | HEIGHT: 60 IN | SYSTOLIC BLOOD PRESSURE: 136 MMHG | WEIGHT: 197 LBS | TEMPERATURE: 97.3 F

## 2024-05-10 DIAGNOSIS — G89.29 CHRONIC BILATERAL LOW BACK PAIN WITHOUT SCIATICA: ICD-10-CM

## 2024-05-10 DIAGNOSIS — M54.50 CHRONIC BILATERAL LOW BACK PAIN WITHOUT SCIATICA: ICD-10-CM

## 2024-05-10 DIAGNOSIS — Z41.9 SURGERY, ELECTIVE: Primary | ICD-10-CM

## 2024-05-10 DIAGNOSIS — M47.816 LUMBAR FACET ARTHROPATHY: ICD-10-CM

## 2024-05-10 DIAGNOSIS — M47.816 LUMBAR FACET ARTHROPATHY: Primary | ICD-10-CM

## 2024-05-10 DIAGNOSIS — M53.3 SACROILIAC JOINT DYSFUNCTION OF BOTH SIDES: ICD-10-CM

## 2024-05-10 DIAGNOSIS — M53.3 SACROILIAC JOINT DYSFUNCTION OF BOTH SIDES: Primary | ICD-10-CM

## 2024-05-10 PROCEDURE — 1125F AMNT PAIN NOTED PAIN PRSNT: CPT | Performed by: NURSE PRACTITIONER

## 2024-05-10 PROCEDURE — 99214 OFFICE O/P EST MOD 30 MIN: CPT | Performed by: NURSE PRACTITIONER

## 2024-05-10 PROCEDURE — 3075F SYST BP GE 130 - 139MM HG: CPT | Performed by: NURSE PRACTITIONER

## 2024-05-10 PROCEDURE — 1160F RVW MEDS BY RX/DR IN RCRD: CPT | Performed by: NURSE PRACTITIONER

## 2024-05-10 PROCEDURE — 3080F DIAST BP >= 90 MM HG: CPT | Performed by: NURSE PRACTITIONER

## 2024-05-10 PROCEDURE — 1159F MED LIST DOCD IN RCRD: CPT | Performed by: NURSE PRACTITIONER

## 2024-05-10 RX ORDER — SODIUM CHLORIDE 0.9 % (FLUSH) 0.9 %
10 SYRINGE (ML) INJECTION EVERY 12 HOURS SCHEDULED
OUTPATIENT
Start: 2024-05-10

## 2024-05-10 RX ORDER — SODIUM CHLORIDE 0.9 % (FLUSH) 0.9 %
10 SYRINGE (ML) INJECTION AS NEEDED
OUTPATIENT
Start: 2024-05-10

## 2024-05-10 RX ORDER — DIAZEPAM 5 MG/1
10 TABLET ORAL ONCE
OUTPATIENT
Start: 2024-05-10 | End: 2024-05-10

## 2024-05-10 RX ORDER — VALACYCLOVIR HYDROCHLORIDE 500 MG/1
500 TABLET, FILM COATED ORAL 2 TIMES DAILY
COMMUNITY
Start: 2024-05-08

## 2024-05-10 RX ORDER — PREDNISONE 10 MG/1
10 TABLET ORAL DAILY
COMMUNITY
Start: 2024-04-30

## 2024-05-16 ENCOUNTER — TRANSCRIBE ORDERS (OUTPATIENT)
Dept: SURGERY | Facility: SURGERY CENTER | Age: 39
End: 2024-05-16
Payer: MEDICARE

## 2024-05-16 DIAGNOSIS — Z41.9 SURGERY, ELECTIVE: Primary | ICD-10-CM

## 2024-05-30 ENCOUNTER — TELEPHONE (OUTPATIENT)
Dept: FAMILY MEDICINE CLINIC | Facility: CLINIC | Age: 39
End: 2024-05-30
Payer: MEDICARE

## 2024-05-30 NOTE — TELEPHONE ENCOUNTER
Spoke with them ,informed Dr Lagunas is out until Monday next week they will make a note about this on her case

## 2024-05-30 NOTE — TELEPHONE ENCOUNTER
Caller: JUAN MANUEL    Relationship: Other    Best call back number: [phone] EXT. [phone]    What form or medical record are you requesting: FORMS CONCERNING RESTRICTIONS UPON PATIENT'S RETURN TO WORK    Who is requesting this form or medical record from you: NEYMAR FORD     How would you like to receive the form or medical records (pick-up, mail, fax): FAX  If fax, what is the fax number: [phone]    Timeframe paperwork needed: ASAP    Additional notes: [employer] CALLED AND SAID THEY HAD FAXED OVER A FORM NEEDING CLARIFICATION ON WHAT RESTRICTIONS THE PATIENT WILL HAVE ONCE SHE RETURNS TO WORK

## 2024-06-10 ENCOUNTER — TELEPHONE (OUTPATIENT)
Dept: FAMILY MEDICINE CLINIC | Facility: CLINIC | Age: 39
End: 2024-06-10
Payer: MEDICARE

## 2024-06-10 NOTE — TELEPHONE ENCOUNTER
Requesting a CB from Dr. Lagunas about [facility] Paperwork. Stated he faxed new paperwork 6/5/24 and has not received a response back

## 2024-06-11 NOTE — TELEPHONE ENCOUNTER
Unfortunately for the extra questions for her long term disability dr breaux suggest  to   contact  [employer]  ,since she was not able to  clarify their questions we sent this  message to [employer] already

## 2024-06-12 NOTE — DISCHARGE INSTRUCTIONS
[facility] - Post-procedure Instructions          --  While there are no absolute restrictions, it is recommended that you do not perform strenuous activity today. In the morning, you may resume your level of activity as before your block.    --  If you have a band-aid at your injection site, please remove it later today. Observe the area for any redness, swelling, pus-like drainage, or a temperature over 101°. If any of these symptoms occur, please call your doctor at [phone]. If after office hours, leave a message and the on-call provider will return your call.    --  Ice may be applied to your injection site. It is recommended you avoid direct heat (heating pad; hot tub) for 1-2 days.    --  Call [facility] at [phone] if you experience persistent headache, persistent bleeding from the injection site, or severe pain not relieved by heat or oral medication.    --  Do not make important decisions today.    --  Due to the effects of the block and/or the I.V. Sedation, DO NOT drive or operate hazardous machinery for 12 hours.  Local anesthetics may cause numbness after procedure and precautions must be taken with regards to operating equipment as well as with walking, even if ambulating with assistance of another person or with an assistive device.    --  Do not drink alcohol for 12 hours.    -- You may return to work tomorrow, or as directed by your referring doctor.    --  Occasionally you may notice a slight increase in your pain after the procedure. This should start to improve within the next 24-48 hours. Radiofrequency ablation procedure pain may last 3-4 weeks.    --  It may take as long as 3-4 days before you notice a gradual improvement in your pain and/or other symptoms.    -- You may continue to take your prescribed pain medication as needed.    --  Some normal possible side effects of steroid use could include fluid retention, increased blood sugar, dull headache,  increased sweating, increased appetite, mood swings and flushing.    --  Diabetics are recommended to watch their blood glucose level closely for 24-48 hours after the injection.    --  Must stay in PACU for 20 min upon arrival and prove no leg weakness before being discharged.    --  IN THE EVENT OF A LIFE THREATENING EMERGENCY, (CHEST PAIN, BREATHING DIFFICULTIES, PARALYSIS…) YOU SHOULD GO TO YOUR NEAREST EMERGENCY ROOM.    --  You should be contacted by our office within 2-3 days to schedule follow up or next appointment date.  If not contacted within 7 days, please call the office at [phone]     If you have even 1 hour of relief, these injections are considered successful.

## 2024-06-12 NOTE — H&P
[facility]   HISTORY AND PHYSICAL    Patient Name: Marycarmen Gauthier  : 1985  MRN: [id number]  Primary Care Physician:  Hellen Lagunas MD  Date of admission: 2024    Subjective   Subjective     Chief Complaint: Low back pain    History of Present Illness  Chronic axial low back pain that is failed conservative treatments.      Review of Systems   Constitutional:  Negative for chills and fever.   Respiratory:  Negative for cough and shortness of breath.    Musculoskeletal:  Positive for back pain.        Personal History     Past Medical History:   Diagnosis Date    Allergic 1986    Anxiety     Asthma     Autonomic dysfunction 2017    Bulging lumbar disc 2015    Chronic pain     Chronic pain disorder     Chronic sinus infection     Coronary artery disease     chris-danlos syndrome    Depression     Dizziness     VICKERS (dyspnea on exertion)     Endometriosis     Essential hypertension     Fatigue     Fibromyalgia     Fibromyalgia, primary 2015    Flexor hallucis longus tendinitis 2013    GERD (gastroesophageal reflux disease) 1998    H/O Bilateral carpal tunnel syndrome     H/O transfusion of packed red blood cells     Headache     History of abnormal cervical Pap smear     History of anemia     History of infectious mononucleosis     History of medical problems     Lichen Sclerosus, Hypermobility, herpes virus    History of tachycardia     POTS    Hyperlipidemia     Hypermobile joint syndrome of multiple sites     Hypermobile joints     Irritable bowel syndrome 2011    Joint pain     Kidney stone     History of 2    Leukocytosis     Low back pain     Lyme disease 2019    Formatting of this note might be different from the original. Chronic    Migraine     Neck pain     Numbness and tingling in both hands     Obesity     Osteoarthritis     Palpitations     POTS (postural orthostatic tachycardia syndrome) 2017    PROM (premature rupture of membranes)     PTSD  (post-traumatic stress disorder) 2014    Seasonal allergies     Sleep apnea 2022    Doc ordered auto sleep sense    Snoring     SOB (shortness of breath)     Syncope and collapse     TMJ dysfunction     Visual impairment     SICCA       Past Surgical History:   Procedure Laterality Date    ANKLE SURGERY Right 2014    FHL tendon release    APPENDECTOMY  2019    incisions took long time to close    BREAST SURGERY  10/15/2013    Reduction    CARPAL TUNNEL RELEASE Right 2017    REVISE MEDIAN N/CARPAL TUNNEL SURG     SECTION  2012    CHOLECYSTECTOMY  2019    post op infection    COLONOSCOPY  2017    HYSTERECTOMY  2018    JOINT REPLACEMENT      Rotator cuff and fhl tendon release (right sides)    LAPAROSCOPIC CHOLECYSTECTOMY W/ CHOLANGIOGRAPHY      ORTHOPEDIC SURGERY      Both knees, right shoulder, right ankle    ROTATOR CUFF REPAIR Right 2009    SACROILIAC JOINT INJECTION Bilateral 2024    Procedure: Bilateral sacroiliac joint injection 58710;  Surgeon: Melissa Kerr MD;  [place] [facility];  Service: Pain Management;  Laterality: Bilateral;    SINUS SURGERY      TRIGGER POINT INJECTION      Neck    WISDOM TOOTH EXTRACTION         Family History: family history includes Anemia in her paternal grandmother; Aneurysm in her maternal grandfather and mother; Anxiety disorder in her paternal grandmother and sister; Arthritis in her paternal grandfather and paternal grandmother; Cancer in her paternal aunt; Cancer (age of onset: 27) in her sister; Cancer (age of onset: 61) in her father; Colon cancer in her maternal grandmother; Depression in her maternal grandmother, paternal grandmother, and sister; Heart attack in her maternal grandfather; Heart disease in her maternal grandmother and paternal grandfather; Hyperlipidemia in her maternal grandmother and paternal grandfather; Hypertension in her maternal grandmother and mother; Lymphoma (age of onset: 27) in her sister;  Lymphoma (age of onset: 61) in her father; Mental illness in her paternal grandmother; Obesity in her father, mother, and sister; Other in her mother; Stroke in her maternal grandmother; Sudden death in her maternal grandfather. Otherwise pertinent FHx was reviewed and not pertinent to current issue.    Social History:  reports that she has never smoked. She has never been exposed to tobacco smoke. She has never used smokeless tobacco. She reports current alcohol use. She reports that she does not use drugs.    Home Medications:  Beclomethasone Diprop HFA, FLUoxetine, Fexofenadine HCl, Fluticasone-Umeclidin-Vilant, Melatonin, Omega-3 Fish Oil, Sod Fluoride-Potassium Nitrate, Theanine, Vitamin D3, acetaminophen-codeine, azelastine, buPROPion XL, clobetasol, cycloSPORINE, diclofenac, guanFACINE, levalbuterol, lidocaine, mometasone, montelukast, omeprazole, pramipexole, predniSONE, pregabalin, rizatriptan, traMADol, valACYclovir, and vitamin B-12    Allergies:  Allergies   Allergen Reactions    Amlodipine Other (See Comments)     Edema and weight gain    Diphenhydramine Other (See Comments)     Agitated, and restless    Hydrocodone-Acetaminophen Itching and Rash    Latex Rash    Latex, Natural Rubber Rash    Sulfa Antibiotics Rash    Sulfamethoxazole-Trimethoprim Itching and Rash       Objective    Objective     Vitals:   Temp:  [97.4 °F (36.3 °C)] 97.4 °F (36.3 °C)  Heart Rate:  [83] 83  Resp:  [16] 16  BP: (125)/(83) 125/83    Physical Exam  Constitutional:       General: She is not in acute distress.  Pulmonary:      Effort: Pulmonary effort is normal. No respiratory distress.   Skin:     General: Skin is warm and dry.   Neurological:      Mental Status: She is alert.   Psychiatric:         Mood and Affect: Mood normal.         Thought Content: Thought content normal.         Result Review    Result Review:  I have personally reviewed the results from the time of this admission to 6/14/2024 08:04 EDT and agree with  these findings:  []  Laboratory list / accordion  []  Microbiology  []  Radiology  []  EKG/Telemetry   []  Cardiology/Vascular   []  Pathology  []  Old records  []  Other:  Most notable findings include: No new      Assessment & Plan   Assessment / Plan     Brief Patient Summary:  Marycarmen Gauthier is a 39 y.o. female who has chronic axial low back pain    Active Hospital Problems:  Active Hospital Problems    Diagnosis     **Lumbar facet arthropathy      Plan: Bilateral medial branch blocks at L5-S1    VTE Prophylaxis:  No VTE prophylaxis order currently exists.      Melissa Kerr MD

## 2024-06-14 ENCOUNTER — HOSPITAL ENCOUNTER (OUTPATIENT)
Facility: SURGERY CENTER | Age: 39
Setting detail: HOSPITAL OUTPATIENT SURGERY
Discharge: HOME | End: 2024-06-14
Attending: ANESTHESIOLOGY | Admitting: ANESTHESIOLOGY
Payer: MEDICARE

## 2024-06-14 ENCOUNTER — HOSPITAL ENCOUNTER (OUTPATIENT)
Dept: GENERAL RADIOLOGY | Facility: SURGERY CENTER | Age: 39
End: 2024-06-14
Payer: MEDICARE

## 2024-06-14 VITALS
DIASTOLIC BLOOD PRESSURE: 101 MMHG | RESPIRATION RATE: 16 BRPM | HEIGHT: 60 IN | WEIGHT: 200 LBS | OXYGEN SATURATION: 100 % | BODY MASS INDEX: 39.27 KG/M2 | HEART RATE: 80 BPM | SYSTOLIC BLOOD PRESSURE: 148 MMHG | TEMPERATURE: 97.4 F

## 2024-06-14 DIAGNOSIS — Z41.9 SURGERY, ELECTIVE: ICD-10-CM

## 2024-06-14 DIAGNOSIS — M47.816 LUMBAR FACET ARTHROPATHY: ICD-10-CM

## 2024-06-14 PROCEDURE — 64493 INJ PARAVERT F JNT L/S 1 LEV: CPT | Performed by: ANESTHESIOLOGY

## 2024-06-14 PROCEDURE — 25010000002 BUPIVACAINE (PF) 0.25 % SOLUTION 10 ML VIAL: Performed by: ANESTHESIOLOGY

## 2024-06-14 PROCEDURE — 77002 NEEDLE LOCALIZATION BY XRAY: CPT

## 2024-06-14 PROCEDURE — 76000 FLUOROSCOPY <1 HR PHYS/QHP: CPT

## 2024-06-14 RX ORDER — DIAZEPAM 5 MG/1
10 TABLET ORAL ONCE
Status: COMPLETED | OUTPATIENT
Start: 2024-06-14 | End: 2024-06-14

## 2024-06-14 RX ORDER — FLUTICASONE FUROATE, UMECLIDINIUM BROMIDE AND VILANTEROL TRIFENATATE 200; 62.5; 25 UG/1; UG/1; UG/1
1 POWDER RESPIRATORY (INHALATION)
COMMUNITY
Start: 2024-05-26

## 2024-06-14 RX ADMIN — DIAZEPAM 10 MG: 5 TABLET ORAL at 07:49

## 2024-06-14 NOTE — OP NOTE
Lumbar Medial Branch Blockade at  Bilateral L5-S1  [facility]      PREOPERATIVE DIAGNOSIS:  Lumbar spondylosis without myelopathy    POSTOPERATIVE DIAGNOSIS:  Lumbar spondylosis without myelopathy    PROCEDURE:   Diagnostic Lumbar Medial Branch Nerve Blockades, with fluoroscopy:  at the L5 transverse processes and the sacral alar groove)   37855-64 -- Lumbar Facet blocks, 1st Level     PRE-PROCEDURE DISCUSSION WITH PATIENT:    Risks and complications were discussed with the patient prior to starting the procedure and informed consent was obtained.      SURGEON:  Melissa Kerr MD    REASON FOR PROCEDURE:    The patient complains of pain that seems to have a significant axial component    SEDATION:  Anxiolysis was used for this procedure which included PO Valium 10mg  ANESTHETIC:  0.25% bupivacaine  STEROID:  None  TOTAL VOLUME OF SOLUTION:  4ml    DESCRIPTON OF PROCEDURE:  After obtaining informed consent, IV access was not obtained in the preoperative area.   The patient was taken to the operating room.  The patient was placed in the prone position with a pillow under the abdomen. All pressure points were well padded.  Heart rate, blood pressure, and pulse oximeter were monitored.  The patient was monitored and sedated by the RN under my direction. The lumbosacral area was prepped with Chloraprep and draped in a sterile fashion.     AP fluoroscopic image was used to visualize the junction of the right S1 superior articular process with the sacral ala.  The skin and subcutaneous tissue over the area was anesthetized with 1% lidocaine.  A 22-gauge spinal needle was then advanced percutaneously through the anesthetized skin tract under fluoroscopic guidance in a coaxial view to contact periosteum.  After negative aspiration, a volume of 1 mL of the local anesthetic was injected without resistance.  The image was then optimized to maximize visualization of the junctions of the L5 superior articular  processes with the transverse processes.  The skin and subcutaneous tissue over the areas was anesthetized with 1% lidocaine.  A 22-gauge spinal needle was then advanced percutaneously through the anesthetized skin tracts under fluoroscopic guidance in a coaxial view to contact periosteum at the target sites.  After negative aspiration, a volume of 1 mL of the local anesthetic  was injected without resistance at each of the target sites.      The same procedure was then performed on the contralateral side in the exact same fashion.        Onset of analgesia was noted.  Vital signs remained stable throughout.      ESTIMATED BLOOD LOSS:  <5 mL  SPECIMENS:  none    COMPLICATIONS:   No complications were noted.    TOLERANCE & DISCHARGE CONDITION:    The patient tolerated the procedure well.  The patient was transported to the recovery area without difficulties.  The patient was discharged to home under the care of family in stable and satisfactory condition.    Pre-procedure pain score: 1/10 at rest, 6/10 with activity  Post-procedure pain score: 3/10    PLAN OF CARE:  The patient was given our standard instruction sheet.  We discussed that Lumbar Medial Branch Blockade is a diagnostic procedure in consideration for radiofrequency ablation if two diagnostic procedures prove to be positive for significant benefit.  An alternative plan could be therapeutic lumbar branch blockades.  The patient is asked to keep an account of pain relief after the procedure today.  The patient will  Return to clinic 1-2 wks.  The patient will resume all medications as per the medication reconciliation sheet.

## 2024-06-25 ENCOUNTER — OFFICE VISIT (OUTPATIENT)
Dept: PAIN MEDICINE | Facility: CLINIC | Age: 39
End: 2024-06-25
Payer: MEDICARE

## 2024-06-25 VITALS
OXYGEN SATURATION: 99 % | TEMPERATURE: 97.5 F | DIASTOLIC BLOOD PRESSURE: 82 MMHG | WEIGHT: 201.2 LBS | HEART RATE: 96 BPM | HEIGHT: 60 IN | RESPIRATION RATE: 18 BRPM | BODY MASS INDEX: 39.5 KG/M2 | SYSTOLIC BLOOD PRESSURE: 124 MMHG

## 2024-06-25 DIAGNOSIS — M47.816 LUMBAR FACET ARTHROPATHY: Primary | ICD-10-CM

## 2024-06-25 DIAGNOSIS — M53.3 SACROILIAC JOINT DYSFUNCTION OF BOTH SIDES: ICD-10-CM

## 2024-06-25 DIAGNOSIS — G89.29 CHRONIC BILATERAL LOW BACK PAIN WITHOUT SCIATICA: ICD-10-CM

## 2024-06-25 DIAGNOSIS — M54.50 CHRONIC BILATERAL LOW BACK PAIN WITHOUT SCIATICA: ICD-10-CM

## 2024-06-25 PROCEDURE — 1159F MED LIST DOCD IN RCRD: CPT | Performed by: NURSE PRACTITIONER

## 2024-06-25 PROCEDURE — 3074F SYST BP LT 130 MM HG: CPT | Performed by: NURSE PRACTITIONER

## 2024-06-25 PROCEDURE — 3079F DIAST BP 80-89 MM HG: CPT | Performed by: NURSE PRACTITIONER

## 2024-06-25 PROCEDURE — 1160F RVW MEDS BY RX/DR IN RCRD: CPT | Performed by: NURSE PRACTITIONER

## 2024-06-25 PROCEDURE — 1125F AMNT PAIN NOTED PAIN PRSNT: CPT | Performed by: NURSE PRACTITIONER

## 2024-06-25 PROCEDURE — 99214 OFFICE O/P EST MOD 30 MIN: CPT | Performed by: NURSE PRACTITIONER

## 2024-06-25 NOTE — PROGRESS NOTES
CHIEF COMPLAINT  Follow-up for back pain.    Subjective   Marycarmen Gauthier is a 39 y.o. female  who presents to the office for follow-up of procedure.  She completed a Lumbar Medial Branch Blockade at  Bilateral L5-S1 on  6/14/2024 performed by Dr. Kerr for management of back pain. Patient reports 100% relief from the procedure the day of the procedure.     Today her pain is 2/10VAS in severity. She states that her pain remains is her lumbar spine and is axial in nature. She denies any lower extremity radicular pain.     Procedure list:  3/27/2024-bilateral SI joint injection-75% temporary relief with ONGOING 60% relief     Past pain medications: Flexeril 5 mg TID PRN     Current pain medications: OTC Tylenol and Ibuprofen (no relief), Voltaren gel (no relief), Lidocaine patches (helpful).      Past therapies:  Physical Therapy: Yes, no relief  Chiropractor: Years ago, not for this current pain  Massage Therapy: No, she is getting a massage today  TENS: Has utilized in the past, has not used for this.   Neck or back surgery: None  Past pain management: Yes, unsure of which clinic (this was years ago)     Previous Injections:   HAMLET--no relief    Back Pain  This is a chronic problem. The current episode started more than 1 month ago. The problem occurs constantly. The problem has been worse (Unstable) since onset. The pain is present in the sacro-iliac and lumbar spine. The quality of the pain is described as aching (dull). The pain does not radiate. The pain is at a severity of 2/10. The symptoms are aggravated by position. Associated symptoms include numbness (bilateral arms) and weakness (bilateral arms). Pertinent negatives include no dysuria. She has tried heat, ice, muscle relaxant, NSAIDs and analgesics (PT) for the symptoms.      PEG Assessment   What number best describes your pain on average in the past week?6  What number best describes how, during the past week, pain has interfered with your enjoyment of  "life?8  What number best describes how, during the past week, pain has interfered with your general activity?  6    The following portions of the patient's history were reviewed and updated as appropriate: allergies, current medications, past family history, past medical history, past social history, past surgical history, and problem list.    Review of Systems   Gastrointestinal:  Negative for constipation and diarrhea.   Genitourinary:  Negative for difficulty urinating and dysuria.   Musculoskeletal:  Positive for back pain.   Neurological:  Positive for weakness (bilateral arms) and numbness (bilateral arms).   Psychiatric/Behavioral:  Positive for sleep disturbance. Negative for suicidal ideas. The patient is nervous/anxious.      --  The aforementioned information the Chief Complaint section and above subjective data including any HPI data, and also the Review of Systems data, has been personally reviewed and affirmed.  --     Vitals:    06/25/24 0755   BP: 124/82   Pulse: 96   Resp: 18   Temp: 97.5 °F (36.4 °C)   SpO2: 99%   Weight: 91.3 kg (201 lb 3.2 oz)   Height: 152.4 cm (60\")   PainSc:   2   PainLoc: Back     Objective   Physical Exam  Vitals and nursing note reviewed.   Constitutional:       Appearance: Normal appearance. She is well-developed.   Eyes:      General: Lids are normal.   Cardiovascular:      Rate and Rhythm: Normal rate.   Pulmonary:      Effort: Pulmonary effort is normal.   Musculoskeletal:      Lumbar back: Tenderness and bony tenderness present. Decreased range of motion.      Comments:   +Lumbar facet loading   Skin:         Neurological:      Mental Status: She is alert and oriented to person, place, and time.   Psychiatric:         Speech: Speech normal.         Behavior: Behavior normal.         Judgment: Judgment normal.       Assessment & Plan   Diagnoses and all orders for this visit:    1. Lumbar facet arthropathy (Primary)    2. Sacroiliac joint dysfunction of both sides    3. " Chronic bilateral low back pain without sciatica        Marycarmen Gauthier reports a pain score of 2.  Given her pain assessment as noted, treatment options were discussed and the following options were decided upon as a follow-up plan to address the patient's pain: continuation of current treatment plan for pain.    Diagnostic Facet Joint Procedure:   MBB     The confirmatory diagnostic facet joint procedure is considered medically reasonable and necessary for the diagnosis and treatment of chronic pain for this patient due to the patient meeting all of the following criteria:    - 1. Moderate to severe chronic neck or low back pain, predominantly axial, that causes functional deficit measured on pain or disability scale.  - 2. Pain present for minimum of 3 months with documented failure to respond to noninvasive conservative management (as tolerated)  - 3. Absence of untreated radiculopathy or neurogenic claudication (except for radiculopathy caused by facet joint synovial cyst)  - 4. There is no non-facet pathology per clinical assessment or radiology studies that could explain the source of the patient’s pain, including but not limited to fracture, tumor, infection, or significant deformity.    The patient has also shown a consistent positive response of at least 80% relief of primary (index) pain (with the duration of relief being consistent with the agent used).    --- Repeat Bilateral L5-S1 MBB (valium)  Reviewed the procedure at length with the patient.  Included in the review was expectations, complications, risk and benefits.The procedure was described in detail and the risks, benefits and alternatives were discussed with the patient (including but not limited to: bleeding, infection, nerve damage, worsening of pain, inability to perform injection, paralysis, seizures, coma, no pain relief and death) who agreed to proceed.  Discussed the potential for sedation if warranted/wanted.  The procedure will plan to  be performed at [facility] with fluoroscopic guidance(unless ultrasound is indicated) and could potentially have steroids and contrast dye used. Questions were answered and in a way the patient could understand.  Patient verbalized understanding and wishes to proceed.  This intervention will be ordered.  Discussed with patient that all procedures are part of a multimodal plan of care and include either formal PT or a home exercise program.  Patient has no evidence of coagulopathy or current infection.    --- Of note the right superior injection with localized redness with a ulcerated center. Ms. Gauthier states that this has been itching and she has been scratching it. On exam there is no edema, drainage, warmth, or spreading redness. Ms. Gauthier was advised to cover this with a dry bandage to keep from scratching and to keep her pant line from rubbing the area. Reviewed that she needs to keep a close eye on this and to notify our office immediately if for any drainage, increase in size of the irritated area, swelling, or pain at the site. Reviewed that if this is not healed it is possible her second MBB with be postponed, she states understanding. I will also notify Dr. Kerr of this.     --- Follow-up after procedure     Dictated utilizing Dragon dictation.

## 2024-06-27 NOTE — DISCHARGE INSTRUCTIONS
[facility] - Post-procedure Instructions          --  While there are no absolute restrictions, it is recommended that you do not perform strenuous activity today. In the morning, you may resume your level of activity as before your block.    --  If you have a band-aid at your injection site, please remove it later today. Observe the area for any redness, swelling, pus-like drainage, or a temperature over 101°. If any of these symptoms occur, please call your doctor at [phone]. If after office hours, leave a message and the on-call provider will return your call.    --  Ice may be applied to your injection site. It is recommended you avoid direct heat (heating pad; hot tub) for 1-2 days.    --  Call [facility] at [phone] if you experience persistent headache, persistent bleeding from the injection site, or severe pain not relieved by heat or oral medication.    --  Do not make important decisions today.    --  Due to the effects of the block and/or the I.V. Sedation, DO NOT drive or operate hazardous machinery for 12 hours.  Local anesthetics may cause numbness after procedure and precautions must be taken with regards to operating equipment as well as with walking, even if ambulating with assistance of another person or with an assistive device.    --  Do not drink alcohol for 12 hours.    -- You may return to work tomorrow, or as directed by your referring doctor.    --  Occasionally you may notice a slight increase in your pain after the procedure. This should start to improve within the next 24-48 hours. Radiofrequency ablation procedure pain may last 3-4 weeks.    --  It may take as long as 3-4 days before you notice a gradual improvement in your pain and/or other symptoms.    -- You may continue to take your prescribed pain medication as needed.    --  Some normal possible side effects of steroid use could include fluid retention, increased blood sugar, dull headache,  increased sweating, increased appetite, mood swings and flushing.    --  Diabetics are recommended to watch their blood glucose level closely for 24-48 hours after the injection.    --  Must stay in PACU for 20 min upon arrival and prove no leg weakness before being discharged.    --  IN THE EVENT OF A LIFE THREATENING EMERGENCY, (CHEST PAIN, BREATHING DIFFICULTIES, PARALYSIS…) YOU SHOULD GO TO YOUR NEAREST EMERGENCY ROOM.    --  You should be contacted by our office within 2-3 days to schedule follow up or next appointment date.  If not contacted within 7 days, please call the office at [phone]     If you have even 1 hour of relief, these injections are considered successful.

## 2024-07-03 ENCOUNTER — HOSPITAL ENCOUNTER (OUTPATIENT)
Dept: GENERAL RADIOLOGY | Facility: SURGERY CENTER | Age: 39
End: 2024-07-03
Payer: MEDICARE

## 2024-07-03 ENCOUNTER — HOSPITAL ENCOUNTER (OUTPATIENT)
Facility: SURGERY CENTER | Age: 39
Setting detail: HOSPITAL OUTPATIENT SURGERY
Discharge: HOME | End: 2024-07-03
Attending: ANESTHESIOLOGY | Admitting: ANESTHESIOLOGY
Payer: MEDICARE

## 2024-07-03 VITALS
TEMPERATURE: 98 F | BODY MASS INDEX: 39.46 KG/M2 | WEIGHT: 201 LBS | DIASTOLIC BLOOD PRESSURE: 86 MMHG | HEIGHT: 60 IN | RESPIRATION RATE: 16 BRPM | SYSTOLIC BLOOD PRESSURE: 129 MMHG | OXYGEN SATURATION: 97 % | HEART RATE: 84 BPM

## 2024-07-03 DIAGNOSIS — M47.816 LUMBAR FACET ARTHROPATHY: ICD-10-CM

## 2024-07-03 DIAGNOSIS — Z41.9 SURGERY, ELECTIVE: ICD-10-CM

## 2024-07-03 PROCEDURE — 76000 FLUOROSCOPY <1 HR PHYS/QHP: CPT

## 2024-07-03 PROCEDURE — 64493 INJ PARAVERT F JNT L/S 1 LEV: CPT | Performed by: ANESTHESIOLOGY

## 2024-07-03 PROCEDURE — 25010000002 BUPIVACAINE (PF) 0.25 % SOLUTION 10 ML VIAL: Performed by: ANESTHESIOLOGY

## 2024-07-03 PROCEDURE — 77002 NEEDLE LOCALIZATION BY XRAY: CPT

## 2024-07-03 RX ORDER — DIAZEPAM 5 MG/1
10 TABLET ORAL ONCE
Status: COMPLETED | OUTPATIENT
Start: 2024-07-03 | End: 2024-07-03

## 2024-07-03 RX ORDER — CLOBETASOL PROPIONATE 0.5 MG/G
OINTMENT TOPICAL
COMMUNITY
Start: 2021-06-28

## 2024-07-03 RX ADMIN — DIAZEPAM 10 MG: 5 TABLET ORAL at 07:59

## 2024-07-03 NOTE — OP NOTE
Lumbar Medial Branch Blockade at  Bilateral L5-S1  [facility]      PREOPERATIVE DIAGNOSIS:  Lumbar spondylosis without myelopathy    POSTOPERATIVE DIAGNOSIS:  Lumbar spondylosis without myelopathy    PROCEDURE:   Diagnostic Lumbar Medial Branch Nerve Blockades, with fluoroscopy:  at the L5 transverse processes and the sacral alar groove)   02687-69 -- Lumbar Facet blocks, 1st Level    PRE-PROCEDURE DISCUSSION WITH PATIENT:    Risks and complications were discussed with the patient prior to starting the procedure and informed consent was obtained.      SURGEON:  Melissa Kerr MD    REASON FOR PROCEDURE:    The patient complains of pain that seems to have a significant axial component and Previous diagnostic positivity of a Lumbar Medial Branch Blockade at the same levels    SEDATION:  Anxiolysis was used for this procedure which included PO Valium 10mg  ANESTHETIC:  0.25% bupivacaine  STEROID:  None  TOTAL VOLUME OF SOLUTION:  4ml    DESCRIPTON OF PROCEDURE:  After obtaining informed consent, IV access was not obtained in the preoperative area.   The patient was taken to the operating room.  The patient was placed in the prone position with a pillow under the abdomen. All pressure points were well padded.  Heart rate, blood pressure, and pulse oximeter were monitored.  The patient was monitored and sedated by the RN under my direction. The lumbosacral area was prepped with Chloraprep and draped in a sterile fashion.     AP fluoroscopic image was used to visualize the junction of the right S1 superior articular process with the sacral ala.  The skin and subcutaneous tissue over the area was anesthetized with 1% lidocaine.  A 22-gauge spinal needle was then advanced percutaneously through the anesthetized skin tract under fluoroscopic guidance in a coaxial view to contact periosteum.  After negative aspiration, a volume of 1 mL of the local anesthetic was injected without resistance.  The image  was then optimized to maximize visualization of the junctions of the L5 superior articular processes with the transverse processes.  The skin and subcutaneous tissue over the areas was anesthetized with 1% lidocaine.  A 22-gauge spinal needle was then advanced percutaneously through the anesthetized skin tracts under fluoroscopic guidance in a coaxial view to contact periosteum at the target sites.  After negative aspiration, a volume of 1 mL of the local anesthetic  was injected without resistance at each of the target sites.      The same procedure was then performed on the contralateral side in the exact same fashion.        Onset of analgesia was noted.  Vital signs remained stable throughout.      ESTIMATED BLOOD LOSS:  <5 mL  SPECIMENS:  none    COMPLICATIONS:   No complications were noted.    TOLERANCE & DISCHARGE CONDITION:    The patient tolerated the procedure well.  The patient was transported to the recovery area without difficulties.  The patient was discharged to home under the care of family in stable and satisfactory condition.    Pre-procedure pain score: 3/10 at rest, 3/10 with activity  Post-procedure pain score: 0/10    PLAN OF CARE:  The patient was given our standard instruction sheet.  We discussed that Lumbar Medial Branch Blockade is a diagnostic procedure in consideration for radiofrequency ablation if two diagnostic procedures prove to be positive for significant benefit.  An alternative plan could be therapeutic lumbar branch blockades.  The patient is asked to keep an account of pain relief after the procedure today.  The patient will  Return to clinic 1-2 wks.  The patient will resume all medications as per the medication reconciliation sheet.

## 2024-07-08 RX ORDER — PRAMIPEXOLE DIHYDROCHLORIDE 0.5 MG/1
TABLET ORAL
Qty: 90 TABLET | Refills: 1 | Status: SHIPPED | OUTPATIENT
Start: 2024-07-08

## 2024-07-08 NOTE — TELEPHONE ENCOUNTER
Rx Refill Note  Requested Prescriptions     Pending Prescriptions Disp Refills    pramipexole (MIRAPEX) 0.5 MG tablet [Pharmacy Med Name: PRAMIPEXOLE 0.5 MG TABLET] 90 tablet 1     Sig: TAKE ONE TABLET BY MOUTH ONCE NIGHTLY      Last office visit with prescribing clinician: 4/15/2024   Last telemedicine visit with prescribing clinician: Visit date not found   Next office visit with prescribing clinician: 10/25/2024                         Would you like a call back once the refill request has been completed: [] Yes [] No    If the office needs to give you a call back, can they leave a voicemail: [] Yes [] No    Naheed Ryder CMA  07/08/24, 08:12 EDT

## 2024-07-16 ENCOUNTER — OFFICE VISIT (OUTPATIENT)
Dept: PAIN MEDICINE | Facility: CLINIC | Age: 39
End: 2024-07-16
Payer: MEDICARE

## 2024-07-16 VITALS
HEART RATE: 92 BPM | BODY MASS INDEX: 39.85 KG/M2 | HEIGHT: 60 IN | TEMPERATURE: 98.2 F | SYSTOLIC BLOOD PRESSURE: 160 MMHG | DIASTOLIC BLOOD PRESSURE: 95 MMHG | WEIGHT: 203 LBS | OXYGEN SATURATION: 97 %

## 2024-07-16 DIAGNOSIS — M47.816 LUMBAR FACET ARTHROPATHY: Primary | ICD-10-CM

## 2024-07-16 DIAGNOSIS — G89.29 CHRONIC BILATERAL LOW BACK PAIN WITHOUT SCIATICA: ICD-10-CM

## 2024-07-16 DIAGNOSIS — M54.50 CHRONIC BILATERAL LOW BACK PAIN WITHOUT SCIATICA: ICD-10-CM

## 2024-07-16 PROCEDURE — 1159F MED LIST DOCD IN RCRD: CPT | Performed by: NURSE PRACTITIONER

## 2024-07-16 PROCEDURE — 1160F RVW MEDS BY RX/DR IN RCRD: CPT | Performed by: NURSE PRACTITIONER

## 2024-07-16 PROCEDURE — 3077F SYST BP >= 140 MM HG: CPT | Performed by: NURSE PRACTITIONER

## 2024-07-16 PROCEDURE — 3080F DIAST BP >= 90 MM HG: CPT | Performed by: NURSE PRACTITIONER

## 2024-07-16 PROCEDURE — 1125F AMNT PAIN NOTED PAIN PRSNT: CPT | Performed by: NURSE PRACTITIONER

## 2024-07-16 PROCEDURE — 99214 OFFICE O/P EST MOD 30 MIN: CPT | Performed by: NURSE PRACTITIONER

## 2024-07-16 NOTE — H&P (VIEW-ONLY)
CHIEF COMPLAINT  F/U back pain- Bilateral L5-S1 medial branch blocakde #2- patient states that she had 100% relief for 3 hours and then she had 90% relief for the remainder of the day.       Subjective   Marycarmen Gauthier is a 39 y.o. female  who presents to the office for follow-up of procedure.  She completed a Bilateral L5-S1 MBB #2  on  7/3/2024 performed by Dr. Kerr for management of back pain. Patient reports 100% relief from the procedure x 3 hours with 90% relief lasting until that evening (total of 9 hours of relief).     Today her pain is 2/10VAS in severity. Her primary pain complaint remains her axial low back pain that is aching in nature. She denies any radicular complaints.      Procedure list:  6/14/2024-bilateral L5-S1 MBB #1-100% relief the day of the procedure  3/27/2024-bilateral SI joint injection-75% temporary relief with ONGOING 60% relief     Past pain medications: Flexeril 5 mg TID PRN     Current pain medications: OTC Tylenol and Ibuprofen (no relief), Voltaren gel (no relief), Lidocaine patches (helpful).      Past therapies:  Physical Therapy: Yes, no relief  Chiropractor: Years ago, not for this current pain  Massage Therapy: No, she is getting a massage today  TENS: Has utilized in the past, has not used for this.   Neck or back surgery: None  Past pain management: Yes, unsure of which clinic (this was years ago)     Previous Injections:   HAMLET--no relief    Back Pain  This is a chronic problem. The current episode started more than 1 month ago. The problem occurs constantly. Progression since onset: Unstable. The pain is present in the sacro-iliac and lumbar spine. The quality of the pain is described as aching (dull). The pain does not radiate. The pain is at a severity of 2/10. The symptoms are aggravated by position. Pertinent negatives include no abdominal pain, chest pain, dysuria, fever, headaches, numbness or weakness. She has tried heat, ice, muscle relaxant, NSAIDs and  "analgesics (PT) for the symptoms.      PEG Assessment   What number best describes your pain on average in the past week?4  What number best describes how, during the past week, pain has interfered with your enjoyment of life?9  What number best describes how, during the past week, pain has interfered with your general activity?  9    The following portions of the patient's history were reviewed and updated as appropriate: allergies, current medications, past family history, past medical history, past social history, past surgical history, and problem list.    Review of Systems   Constitutional:  Positive for fatigue. Negative for activity change, chills and fever.   HENT:  Negative for congestion.    Eyes:  Negative for visual disturbance.   Respiratory:  Negative for chest tightness and shortness of breath.    Cardiovascular:  Negative for chest pain.   Gastrointestinal:  Negative for abdominal pain, constipation and diarrhea.   Genitourinary:  Negative for difficulty urinating, dyspareunia and dysuria.   Musculoskeletal:  Positive for back pain.   Neurological:  Negative for dizziness, weakness, light-headedness, numbness and headaches.   Psychiatric/Behavioral:  Positive for sleep disturbance. Negative for agitation. The patient is not nervous/anxious.        --  The aforementioned information the Chief Complaint section and above subjective data including any HPI data, and also the Review of Systems data, has been personally reviewed and affirmed.  --     Vitals:    07/16/24 0759   BP: 160/95   Pulse: 92   Temp: 98.2 °F (36.8 °C)   SpO2: 97%   Weight: 92.1 kg (203 lb)   Height: 152.4 cm (60\")   PainSc:   2   PainLoc: Back     Objective   Physical Exam  Vitals and nursing note reviewed.   Constitutional:       Appearance: Normal appearance. She is well-developed.   Eyes:      General: Lids are normal.   Cardiovascular:      Rate and Rhythm: Normal rate.   Pulmonary:      Effort: Pulmonary effort is normal. "   Musculoskeletal:      Lumbar back: Tenderness and bony tenderness present. Decreased range of motion.      Comments:   +Lumbar facet loading   Neurological:      Mental Status: She is alert and oriented to person, place, and time.   Psychiatric:         Attention and Perception: Attention normal.         Mood and Affect: Mood normal.         Speech: Speech normal.         Behavior: Behavior normal.         Judgment: Judgment normal.       Assessment & Plan   Diagnoses and all orders for this visit:    1. Lumbar facet arthropathy (Primary)    2. Chronic bilateral low back pain without sciatica      Marycarmen Gauthier reports a pain score of 2.  Given her pain assessment as noted, treatment options were discussed and the following options were decided upon as a follow-up plan to address the patient's pain: continuation of current treatment plan for pain.    Thermal Radiofrequency Destruction    This initial thermal radiofrequency destruction (RFA) of cervical, thoracic, or lumbar paravertebral facet joint (medial branch) nerves is considered medically reasonable and necessary as this patient has met the criteria of having at least two (2) medically reasonable and necessary diagnostic MBBs, with each one providing a consistent minimum of 80% sustained relief of primary (index) pain (with the duration of relief being consistent with the agent used).    --- Bilateral L5-S1 RFA  Reviewed the procedure at length with the patient.  Included in the review was expectations, complications, risk and benefits.The procedure was described in detail and the risks, benefits and alternatives were discussed with the patient (including but not limited to: bleeding, infection, nerve damage, worsening of pain, inability to perform injection, paralysis, seizures, coma, no pain relief and death) who agreed to proceed.  Discussed the potential for sedation if warranted/wanted.  The procedure will plan to be performed at [facility] with fluoroscopic guidance(unless ultrasound is indicated) and could potentially have steroids and contrast dye used. Questions were answered and in a way the patient could understand.  Patient verbalized understanding and wishes to proceed.  This intervention will be ordered.  Discussed with patient that all procedures are part of a multimodal plan of care and include either formal PT or a home exercise program.  Patient has no evidence of coagulopathy or current infection.    --- Follow-up after procedure     Dictated utilizing Dragon dictation.

## 2024-07-16 NOTE — PROGRESS NOTES
CHIEF COMPLAINT  F/U back pain- Bilateral L5-S1 medial branch blocakde #2- patient states that she had 100% relief for 3 hours and then she had 90% relief for the remainder of the day.       Subjective   Marycarmen Gauthier is a 39 y.o. female  who presents to the office for follow-up of procedure.  She completed a Bilateral L5-S1 MBB #2  on  7/3/2024 performed by Dr. Kerr for management of back pain. Patient reports 100% relief from the procedure x 3 hours with 90% relief lasting until that evening (total of 9 hours of relief).     Today her pain is 2/10VAS in severity. Her primary pain complaint remains her axial low back pain that is aching in nature. She denies any radicular complaints.      Procedure list:  6/14/2024-bilateral L5-S1 MBB #1-100% relief the day of the procedure  3/27/2024-bilateral SI joint injection-75% temporary relief with ONGOING 60% relief     Past pain medications: Flexeril 5 mg TID PRN     Current pain medications: OTC Tylenol and Ibuprofen (no relief), Voltaren gel (no relief), Lidocaine patches (helpful).      Past therapies:  Physical Therapy: Yes, no relief  Chiropractor: Years ago, not for this current pain  Massage Therapy: No, she is getting a massage today  TENS: Has utilized in the past, has not used for this.   Neck or back surgery: None  Past pain management: Yes, unsure of which clinic (this was years ago)     Previous Injections:   HAMLET--no relief    Back Pain  This is a chronic problem. The current episode started more than 1 month ago. The problem occurs constantly. Progression since onset: Unstable. The pain is present in the sacro-iliac and lumbar spine. The quality of the pain is described as aching (dull). The pain does not radiate. The pain is at a severity of 2/10. The symptoms are aggravated by position. Pertinent negatives include no abdominal pain, chest pain, dysuria, fever, headaches, numbness or weakness. She has tried heat, ice, muscle relaxant, NSAIDs and  "analgesics (PT) for the symptoms.      PEG Assessment   What number best describes your pain on average in the past week?4  What number best describes how, during the past week, pain has interfered with your enjoyment of life?9  What number best describes how, during the past week, pain has interfered with your general activity?  9    The following portions of the patient's history were reviewed and updated as appropriate: allergies, current medications, past family history, past medical history, past social history, past surgical history, and problem list.    Review of Systems   Constitutional:  Positive for fatigue. Negative for activity change, chills and fever.   HENT:  Negative for congestion.    Eyes:  Negative for visual disturbance.   Respiratory:  Negative for chest tightness and shortness of breath.    Cardiovascular:  Negative for chest pain.   Gastrointestinal:  Negative for abdominal pain, constipation and diarrhea.   Genitourinary:  Negative for difficulty urinating, dyspareunia and dysuria.   Musculoskeletal:  Positive for back pain.   Neurological:  Negative for dizziness, weakness, light-headedness, numbness and headaches.   Psychiatric/Behavioral:  Positive for sleep disturbance. Negative for agitation. The patient is not nervous/anxious.        --  The aforementioned information the Chief Complaint section and above subjective data including any HPI data, and also the Review of Systems data, has been personally reviewed and affirmed.  --     Vitals:    07/16/24 0759   BP: 160/95   Pulse: 92   Temp: 98.2 °F (36.8 °C)   SpO2: 97%   Weight: 92.1 kg (203 lb)   Height: 152.4 cm (60\")   PainSc:   2   PainLoc: Back     Objective   Physical Exam  Vitals and nursing note reviewed.   Constitutional:       Appearance: Normal appearance. She is well-developed.   Eyes:      General: Lids are normal.   Cardiovascular:      Rate and Rhythm: Normal rate.   Pulmonary:      Effort: Pulmonary effort is normal. "   Musculoskeletal:      Lumbar back: Tenderness and bony tenderness present. Decreased range of motion.      Comments:   +Lumbar facet loading   Neurological:      Mental Status: She is alert and oriented to person, place, and time.   Psychiatric:         Attention and Perception: Attention normal.         Mood and Affect: Mood normal.         Speech: Speech normal.         Behavior: Behavior normal.         Judgment: Judgment normal.       Assessment & Plan   Diagnoses and all orders for this visit:    1. Lumbar facet arthropathy (Primary)    2. Chronic bilateral low back pain without sciatica      Marycarmen Gauthier reports a pain score of 2.  Given her pain assessment as noted, treatment options were discussed and the following options were decided upon as a follow-up plan to address the patient's pain: continuation of current treatment plan for pain.    Thermal Radiofrequency Destruction    This initial thermal radiofrequency destruction (RFA) of cervical, thoracic, or lumbar paravertebral facet joint (medial branch) nerves is considered medically reasonable and necessary as this patient has met the criteria of having at least two (2) medically reasonable and necessary diagnostic MBBs, with each one providing a consistent minimum of 80% sustained relief of primary (index) pain (with the duration of relief being consistent with the agent used).    --- Bilateral L5-S1 RFA  Reviewed the procedure at length with the patient.  Included in the review was expectations, complications, risk and benefits.The procedure was described in detail and the risks, benefits and alternatives were discussed with the patient (including but not limited to: bleeding, infection, nerve damage, worsening of pain, inability to perform injection, paralysis, seizures, coma, no pain relief and death) who agreed to proceed.  Discussed the potential for sedation if warranted/wanted.  The procedure will plan to be performed at [facility] with fluoroscopic guidance(unless ultrasound is indicated) and could potentially have steroids and contrast dye used. Questions were answered and in a way the patient could understand.  Patient verbalized understanding and wishes to proceed.  This intervention will be ordered.  Discussed with patient that all procedures are part of a multimodal plan of care and include either formal PT or a home exercise program.  Patient has no evidence of coagulopathy or current infection.    --- Follow-up after procedure     Dictated utilizing Dragon dictation.

## 2024-07-23 RX ORDER — FENTANYL CITRATE 50 UG/ML
50 INJECTION, SOLUTION INTRAMUSCULAR; INTRAVENOUS ONCE
Status: COMPLETED | OUTPATIENT
Start: 2024-07-23 | End: 2024-07-26

## 2024-07-23 RX ORDER — MONTELUKAST SODIUM 10 MG/1
TABLET ORAL
Qty: 90 TABLET | Refills: 1 | Status: SHIPPED | OUTPATIENT
Start: 2024-07-23

## 2024-07-23 RX ORDER — MIDAZOLAM HYDROCHLORIDE 1 MG/ML
2 INJECTION INTRAMUSCULAR; INTRAVENOUS ONCE
Status: COMPLETED | OUTPATIENT
Start: 2024-07-23 | End: 2024-07-26

## 2024-07-23 NOTE — DISCHARGE INSTRUCTIONS
[facility] - Post-procedure Instructions          --  While there are no absolute restrictions, it is recommended that you do not perform strenuous activity today. In the morning, you may resume your level of activity as before your block.    --  If you have a band-aid at your injection site, please remove it later today. Observe the area for any redness, swelling, pus-like drainage, or a temperature over 101°. If any of these symptoms occur, please call your doctor at [phone]. If after office hours, leave a message and the on-call provider will return your call.    --  Ice may be applied to your injection site. It is recommended you avoid direct heat (heating pad; hot tub) for 1-2 days.    --  Call [facility] at [phone] if you experience persistent headache, persistent bleeding from the injection site, or severe pain not relieved by heat or oral medication.    --  Do not make important decisions today.    --  Due to the effects of the block and/or the I.V. Sedation, DO NOT drive or operate hazardous machinery for 12 hours.  Local anesthetics may cause numbness after procedure and precautions must be taken with regards to operating equipment as well as with walking, even if ambulating with assistance of another person or with an assistive device.    --  Do not drink alcohol for 12 hours.    -- You may return to work tomorrow, or as directed by your referring doctor.    --  Occasionally you may notice a slight increase in your pain after the procedure. This should start to improve within the next 24-48 hours. Radiofrequency ablation procedure pain may last 3-4 weeks.    --  It may take as long as 3-4 days before you notice a gradual improvement in your pain and/or other symptoms.    -- You may continue to take your prescribed pain medication as needed.    --  Some normal possible side effects of steroid use could include fluid retention, increased blood sugar, dull headache,  "increased sweating, increased appetite, mood swings and flushing.    --  Diabetics are recommended to watch their blood glucose level closely for 24-48 hours after the injection.    --  Must stay in PACU for 20 min upon arrival and prove no leg weakness before being discharged.    --  IN THE EVENT OF A LIFE THREATENING EMERGENCY, (CHEST PAIN, BREATHING DIFFICULTIES, PARALYSIS…) YOU SHOULD GO TO YOUR NEAREST EMERGENCY ROOM.    --  You should be contacted by our office within 2-3 days to schedule follow up or next appointment date.  If not contacted within 7 days, please call the office at [phone]     Radiofrequency ablation (RFA):     - Radiofrequency ablation is a term used to describe cauterization or \"burning.\"   - In pain management, we can use this technique with a special needle to target and destroy areas that are causing your pain.   - In most cases, you must have TWO successful \"test injections\" before you are a candidate for RFA.    After your RFA:   - Because heat is used in this technique, it is common to have soreness after the procedure.  Sometimes \"neuritis\" occurs, which feels like tingling, prickly, or sunburn under the skin sensations.   - Ice packs are helpful in decreasing this soreness and preventing post-procedure \"neuritis\" pain.  Use an ice pack for 20 minutes at a time at least 3 times the day of and the day after your procedure.   - It is common to have arm/leg numbness or weakness the day of your procedure, but this should wear off by the following day.   - It may take up to 6 weeks to gain full benefit from this procedure.   "

## 2024-07-26 ENCOUNTER — HOSPITAL ENCOUNTER (OUTPATIENT)
Dept: GENERAL RADIOLOGY | Facility: SURGERY CENTER | Age: 39
End: 2024-07-26
Payer: MEDICARE

## 2024-07-26 ENCOUNTER — HOSPITAL ENCOUNTER (OUTPATIENT)
Facility: SURGERY CENTER | Age: 39
Setting detail: HOSPITAL OUTPATIENT SURGERY
Discharge: HOME | End: 2024-07-26
Attending: ANESTHESIOLOGY | Admitting: ANESTHESIOLOGY
Payer: MEDICARE

## 2024-07-26 VITALS
TEMPERATURE: 98.6 F | HEIGHT: 61 IN | SYSTOLIC BLOOD PRESSURE: 143 MMHG | DIASTOLIC BLOOD PRESSURE: 88 MMHG | BODY MASS INDEX: 37.76 KG/M2 | WEIGHT: 200 LBS | RESPIRATION RATE: 18 BRPM | HEART RATE: 85 BPM | OXYGEN SATURATION: 96 %

## 2024-07-26 DIAGNOSIS — Z41.9 SURGERY, ELECTIVE: ICD-10-CM

## 2024-07-26 DIAGNOSIS — M47.816 LUMBAR FACET ARTHROPATHY: ICD-10-CM

## 2024-07-26 PROCEDURE — 99152 MOD SED SAME PHYS/QHP 5/>YRS: CPT | Performed by: ANESTHESIOLOGY

## 2024-07-26 PROCEDURE — 64635 DESTROY LUMB/SAC FACET JNT: CPT | Performed by: ANESTHESIOLOGY

## 2024-07-26 PROCEDURE — 25010000002 BUPIVACAINE (PF) 0.25 % SOLUTION 10 ML VIAL: Performed by: ANESTHESIOLOGY

## 2024-07-26 PROCEDURE — 25010000002 MIDAZOLAM PER 1MG: Performed by: ANESTHESIOLOGY

## 2024-07-26 PROCEDURE — 76000 FLUOROSCOPY <1 HR PHYS/QHP: CPT

## 2024-07-26 PROCEDURE — 25010000002 FENTANYL CITRATE (PF) 100 MCG/2ML SOLUTION: Performed by: ANESTHESIOLOGY

## 2024-07-26 RX ORDER — SODIUM CHLORIDE 0.9 % (FLUSH) 0.9 %
10 SYRINGE (ML) INJECTION AS NEEDED
Status: DISCONTINUED | OUTPATIENT
Start: 2024-07-26 | End: 2024-07-26 | Stop reason: HOSPADM

## 2024-07-26 RX ORDER — SODIUM CHLORIDE 0.9 % (FLUSH) 0.9 %
10 SYRINGE (ML) INJECTION EVERY 12 HOURS SCHEDULED
Status: DISCONTINUED | OUTPATIENT
Start: 2024-07-26 | End: 2024-07-26 | Stop reason: HOSPADM

## 2024-07-26 RX ADMIN — MIDAZOLAM HYDROCHLORIDE 2 MG: 2 INJECTION, SOLUTION INTRAMUSCULAR; INTRAVENOUS at 08:02

## 2024-07-26 RX ADMIN — FENTANYL CITRATE 50 MCG: 50 INJECTION INTRAMUSCULAR; INTRAVENOUS at 08:02

## 2024-07-26 NOTE — OP NOTE
Radiofrequency ablation of bilateral L5-S1  [facility]    PREOPERATIVE DIAGNOSIS:  Lumbar spondylosis without myelopathy   POSTOPERATIVE DIAGNOSIS:  Lumbar spondylosis without myelopathy     PROCEDURES PERFORMED:    Bilateral   lumbar radiofrequency ablation of the medial branches at the transverse processes of L5, and the sacral alar groove to thermally treat these facet joints.  1.  94910 -50 Lumbar radiofrequency ablation 1st level.    INFORMED CONSENT:  In preprocedure discussion with the patient, the risks and complications were discussed prior to starting the procedure and informed consent was obtained.     SURGEON:   Melissa Kerr MD    INDICATIONS:  The patient presents with chronic lower back pain. The patient underwent 2 lumbar medial branch blocks with diagnostically positive relief. Given the patient’s significant pain relief, it is diagnostic that we have likely found the source of the patient’s pain; therefore, lumbar radiofrequency ablation has been indicated.     SEDATION:  Anxiolysis was used for this procedure which included Versed 2mg & Fentanyl 50mcg    TIME OF PROCEDURE:  The Interoperative procedure time, after administration of the IV sedative, was 7764-1397 minutes.    ANESTHETIC:  Lidocaine 1% for skin infiltration, 2% lidocaine and 0.25% bupivacaine for injection.    STEROID:  None.    DESCRIPTION OF PROCEDURE:  After obtaining informed consent an IV was  started in the preoperative area. The patient was taken to the operating room. The patient was placed in prone position with a pillow under the abdomen. All pressure points were padded. Heart rate, blood pressure, and pulse oximetry were monitored. The patient was  sedated. The lumbosacral area was prepped with ChloraPrep and draped in a sterile fashion.     The junction of the right S1 superior articular process and sacral ala was identified in a AP fluoroscopic view. The skin and subcutaneous tissue inferior to the  junction was anesthetized with 1% lidocaine. A 20-gauge 150mm RF Abbott needle was then advanced percutaneously through the anesthetized skin tract under fluoroscopic guidance until the non-insulated portion of the needle lie at the junction.  The image was then obliqued towards the right side to maximize visualization of the junctions of the L5 superior articular processes with the transverse processes.  Needle placement was performed as described above until the non-insulated portion of the needles lie at the targeted junctions.  All needle tips were confirmed to be posterior to the neural foramen in the lateral fluoroscopic view. Sensory stimulation was then performed with good stimulation of the back at 0.5V or less at each level. Motor stimulation was performed up to 1.5V at each level producing stimulation of the multifidus muscles of the back and no stimulation of the lower extremity at any level. Each level was then anesthetized with 2% lidocaine prior to treatment with pulsed radiofrequency thermocoagulation at 42 degrees Celsius. Each level was then treated with thermal radiofrequency thermocoagulation at 80 degrees Celsius for 60 seconds in two separate cycles.  Prior to the removal of each needle, a volume of 1 mL of injectate was administered at each site.  The total volume consisted of 1mL of 2% lidocaine and 1mL of 0.25% bupivacaine.    The same procedure was then performed on the contralateral side in the exact same fashion.      ESTIMATED BLOOD LOSS:  Minimal.    SPECIMENS:  None.    COMPLICATIONS:  None.    TOLERANCE AND DISCHARGE:  The patient tolerated the procedure well. The patient was transported to the recovery area without difficulties. The patient was discharged home under the care of family in stable and satisfactory condition.    PLAN:  1.  The patient was given our standard instruction sheet.  2.  The patient will resume all medications per the medication reconciliation sheet.  3.  The  patient will Return to clinic 6 wks.

## 2024-08-23 ENCOUNTER — PATIENT ROUNDING (BHMG ONLY) (OUTPATIENT)
Dept: URGENT CARE | Facility: CLINIC | Age: 39
End: 2024-08-23
Payer: MEDICARE

## 2024-08-23 NOTE — ED NOTES
Thank you for letting us care for you in your recent visit to our urgent care center. We would love to hear about your experience with us. Was this the first time you have visited our location?    We’re always looking for ways to make our patients’ experiences even better. Do you have any recommendations on ways we may improve?     I appreciate you taking the time to respond. Please be on the lookout for a survey about your recent visit from [employer] via text or email. We would greatly appreciate if you could fill that out and turn it back in. We want your voice to be heard and we value your feedback.   Thank you for choosing [facility] for your healthcare needs.

## 2024-08-24 ENCOUNTER — PATIENT MESSAGE (OUTPATIENT)
Dept: FAMILY MEDICINE CLINIC | Facility: CLINIC | Age: 39
End: 2024-08-24
Payer: MEDICARE

## 2024-09-04 NOTE — TELEPHONE ENCOUNTER
Please draft the following letter on our office letterhead so that I may sign it and we can submit it to her disability insurance.      Regarding Marycarmen Saunders, 1985.  This patient has been under my care since December 1, 2021.  I do think she meets the qualifications of long-term disability.  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  These diagnoses are not new, she does well to seek care and tries her best to work despite these impairments.     These conditions do impair her ability to perform many daily activities.  Unfortunately with the postural tachycardia and frequent pain she often will feel dizzy, lightheaded with tachycardia over 120, nausea this with simple activities of standing to go to the restroom or sitting upright.  These episodes can be long-lasting for days.  These symptoms would appear her from doing a sedentary job or being a very unreliable employee as the symptoms can be long-lasting for days.    She has sought care, even driving to [place] trying to have improvement in her quality of life.  Despite many trips to [place], visits with her cardiologist, physical therapy, orthopedist, visits with sleep medicine and more she still has ongoing chronic pain, dizziness and near syncope.      It is my medical opinion that she does qualify for long-term disability due to chronic medical problems that have been adequately treated but unfortunately not with improvement in the quality of life.  I do see Ms. saunders investing in her care being proactive to stay as healthy as she is able.  However, despite her good efforts she is not able to work, or even cook, clean and care for family without substantial impairments.    If there is any further questions, do not hesitate to reach out.  Our office phone number is  [phone].    Thank you,        Hellen Lagunas MD

## 2024-09-04 NOTE — TELEPHONE ENCOUNTER
From: Marycarmen Gauthier  To: Hellen Lagunas  Sent: 8/24/2024 9:20 PM EDT  Subject: Help with a statement please    Please see attached letter for helping me with a statement if you are able.  Thanks,   Marycarmen

## 2024-09-06 ENCOUNTER — OFFICE VISIT (OUTPATIENT)
Dept: PAIN MEDICINE | Facility: CLINIC | Age: 39
End: 2024-09-06
Payer: MEDICARE

## 2024-09-06 VITALS
HEART RATE: 86 BPM | HEIGHT: 61 IN | DIASTOLIC BLOOD PRESSURE: 83 MMHG | TEMPERATURE: 98.3 F | OXYGEN SATURATION: 95 % | WEIGHT: 209.4 LBS | SYSTOLIC BLOOD PRESSURE: 128 MMHG | BODY MASS INDEX: 39.53 KG/M2

## 2024-09-06 DIAGNOSIS — M54.50 CHRONIC BILATERAL LOW BACK PAIN WITHOUT SCIATICA: ICD-10-CM

## 2024-09-06 DIAGNOSIS — M53.3 SACROILIAC JOINT DYSFUNCTION OF BOTH SIDES: ICD-10-CM

## 2024-09-06 DIAGNOSIS — G89.29 CHRONIC BILATERAL LOW BACK PAIN WITHOUT SCIATICA: ICD-10-CM

## 2024-09-06 DIAGNOSIS — M47.816 LUMBAR FACET ARTHROPATHY: Primary | ICD-10-CM

## 2024-09-06 PROCEDURE — 3079F DIAST BP 80-89 MM HG: CPT | Performed by: NURSE PRACTITIONER

## 2024-09-06 PROCEDURE — 3074F SYST BP LT 130 MM HG: CPT | Performed by: NURSE PRACTITIONER

## 2024-09-06 PROCEDURE — 1160F RVW MEDS BY RX/DR IN RCRD: CPT | Performed by: NURSE PRACTITIONER

## 2024-09-06 PROCEDURE — 1159F MED LIST DOCD IN RCRD: CPT | Performed by: NURSE PRACTITIONER

## 2024-09-06 PROCEDURE — 1125F AMNT PAIN NOTED PAIN PRSNT: CPT | Performed by: NURSE PRACTITIONER

## 2024-09-06 PROCEDURE — 99213 OFFICE O/P EST LOW 20 MIN: CPT | Performed by: NURSE PRACTITIONER

## 2024-09-06 NOTE — PROGRESS NOTES
CHIEF COMPLAINT  F/U back pain- Bilateral L5-S1 radiofrequency ablation- patient states that she has had 60% improvement since the procedure.      Subjective   Marycarmen Gauthier is a 39 y.o. female  who presents to the office for follow-up of procedure.  She completed a Bilateral L5-S1 RFA   on  7/26/2024 performed by Dr. Kerr for management of back pain. Patient reports 60% ONGOING relief from the procedure.     Today her pain is 2/10VAS in severity. She states that does have an improvement in her back pain by at least 60%. She notes that she does still have some ongoing low back pain that wakes her up at night.     Procedure list:  7/30/2024-bilateral L5-S1 MBB #2 -100% relief x 3 hours with 90% relief lasting a total of 9 hours  6/14/2024-bilateral L5-S1 MBB #1-100% relief the day of the procedure  3/27/2024-bilateral SI joint injection-75% temporary relief with ONGOING 60% relief     Past pain medications: Flexeril 5 mg TID PRN     Current pain medications: OTC Tylenol and Ibuprofen (no relief), Voltaren gel (no relief), Lidocaine patches (helpful).      Past therapies:  Physical Therapy: Yes, no relief  Chiropractor: Years ago, not for this current pain  Massage Therapy: No, she is getting a massage today  TENS: Has utilized in the past, has not used for this.   Neck or back surgery: None  Past pain management: Yes, unsure of which clinic (this was years ago)     Previous Injections:   HAMLET--no relief    Back Pain  This is a chronic problem. The current episode started more than 1 month ago. The problem occurs constantly. Progression since onset: Unstable. The pain is present in the sacro-iliac and lumbar spine. The quality of the pain is described as aching (dull). The pain does not radiate. The pain is at a severity of 2/10. The symptoms are aggravated by position. Associated symptoms include headaches. Pertinent negatives include no abdominal pain, chest pain, dysuria, fever, numbness or weakness. She has  "tried heat, ice, muscle relaxant, NSAIDs and analgesics (PT) for the symptoms.      PEG Assessment   What number best describes your pain on average in the past week?4  What number best describes how, during the past week, pain has interfered with your enjoyment of life?6  What number best describes how, during the past week, pain has interfered with your general activity?  9    The following portions of the patient's history were reviewed and updated as appropriate: allergies, current medications, past family history, past medical history, past social history, past surgical history, and problem list.    Review of Systems   Constitutional:  Negative for activity change, chills, fatigue and fever.   HENT:  Negative for congestion.    Eyes:  Negative for visual disturbance.   Respiratory:  Positive for chest tightness. Negative for shortness of breath.    Cardiovascular:  Negative for chest pain.   Gastrointestinal:  Positive for constipation and diarrhea. Negative for abdominal pain.   Genitourinary:  Negative for difficulty urinating, dyspareunia and dysuria.   Musculoskeletal:  Positive for back pain.   Neurological:  Positive for dizziness, light-headedness and headaches. Negative for weakness and numbness.   Psychiatric/Behavioral:  Positive for sleep disturbance. Negative for agitation. The patient is nervous/anxious.      --  The aforementioned information the Chief Complaint section and above subjective data including any HPI data, and also the Review of Systems data, has been personally reviewed and affirmed.  --     Vitals:    09/06/24 1446   BP: 128/83   Pulse: 86   Temp: 98.3 °F (36.8 °C)   SpO2: 95%   Weight: 95 kg (209 lb 6.4 oz)   Height: 154.9 cm (61\")   PainSc:   2   PainLoc: Back     Objective   Physical Exam  Vitals and nursing note reviewed.   Constitutional:       Appearance: Normal appearance. She is well-developed.   Eyes:      General: Lids are normal.   Cardiovascular:      Rate and Rhythm: " Normal rate.   Pulmonary:      Effort: Pulmonary effort is normal.   Musculoskeletal:      Lumbar back: Tenderness and bony tenderness present. Decreased range of motion.      Comments:   +Shay Citlaly  +Shay Gaenslen's  - Shay Thigh Thrust  +Shay SI Compression   Neurological:      Mental Status: She is alert and oriented to person, place, and time.   Psychiatric:         Attention and Perception: Attention normal.         Mood and Affect: Mood normal.         Speech: Speech normal.         Behavior: Behavior normal.         Judgment: Judgment normal.       Assessment & Plan   Diagnoses and all orders for this visit:    1. Lumbar facet arthropathy (Primary)    2. Chronic bilateral low back pain without sciatica    3. Sacroiliac joint dysfunction of both sides      I spent 21 minutes caring for Marycarmen on this date of service. This time includes time spent by me in the following activities: preparing for the visit, reviewing tests, performing a medically appropriate examination and/or evaluation, counseling and educating the patient/family/caregiver, and obtaining a separately obtained history     Marycarmen Gauthier reports a pain score of 2.  Given her pain assessment as noted, treatment options were discussed and the following options were decided upon as a follow-up plan to address the patient's pain: steroid injections.    --- She would likely benefit from another Bilateral SI joint injection, she has had multiple oral steroid exposures over the last 12 months d/t asthma and I recommend a 3 month vacation if possible. Reviewed that this may not be possible with her asthma, but unless her pain increases significantly I recommend that we avoid steroid containing procedures at this time.   --- May repeat Lumbar RFA every 6 months PRN  --- Follow-up if pain returns/worsens.      Dictated utilizing Dragon dictation.

## 2024-09-10 ENCOUNTER — OFFICE VISIT (OUTPATIENT)
Dept: CARDIOLOGY | Facility: CLINIC | Age: 39
End: 2024-09-10
Payer: MEDICARE

## 2024-09-10 VITALS
HEART RATE: 76 BPM | SYSTOLIC BLOOD PRESSURE: 159 MMHG | BODY MASS INDEX: 39.46 KG/M2 | HEIGHT: 61 IN | WEIGHT: 209 LBS | DIASTOLIC BLOOD PRESSURE: 93 MMHG

## 2024-09-10 DIAGNOSIS — E78.01 FAMILIAL HYPERCHOLESTEROLEMIA: ICD-10-CM

## 2024-09-10 DIAGNOSIS — G90.A POTS (POSTURAL ORTHOSTATIC TACHYCARDIA SYNDROME): Primary | ICD-10-CM

## 2024-09-10 DIAGNOSIS — I10 ESSENTIAL HYPERTENSION: ICD-10-CM

## 2024-09-10 RX ORDER — SUVOREXANT 5 MG/1
10 TABLET, FILM COATED ORAL DAILY
COMMUNITY
Start: 2024-08-08

## 2024-09-10 RX ORDER — METOPROLOL TARTRATE 25 MG/1
25 TABLET, FILM COATED ORAL 2 TIMES DAILY
Qty: 60 TABLET | Refills: 11 | Status: SHIPPED | OUTPATIENT
Start: 2024-09-10

## 2024-09-10 NOTE — PROGRESS NOTES
Date of Office Visit: 09/10/2024  Encounter Provider: MAGGI Marquez  Place of Service: [facility] CARDIOLOGY  Established cardiologist: Jazz Hardin MD  Patient Name: Marycarmen Gauthier  :1985      Patient ID:  Marycarmen Gauthier is a 39 y.o. female is here for  followup    With a pertinent medical history of hypertension, fibromyalgia, hypermobile joints/sicca syndrome (Dr. Crook), posttraumatic stress disorder, anxiety, Lyme disease, hysterectomy 2018. POTS and Erler Danlos syndrome III     History of Present Illness  She had a tilt table study done 17 which showed inappropriate sinus tachycardia but no evidence of vasovagal syncope.  Tachycardia occurred after nitroglycerin.     She was seen at the [facility] 2018.  She went there for POTS.  In , she had premature rupture of membranes and had her son prematurely at 29 weeks.  He was in the  intensive care unit for 3 months.  A after that, she began having severe anxiety.  Her autonomic testing done at [facility] showed a normal QSART-no peripheral autonomic neuropathy.  Her tilt table study showed postural tachycardia but stable blood pressure.  They thought she had postural orthostatic tachycardia syndrome and recommended exercise and consideration of beta blockers.  In addition they recommended 3-5 g of sodium a day, to 2.5 L of fluid per day and sleeping with her head raised 6-10 inches as well as compression stockings avoiding alcohol and large meals.     She is  and has one child who was born premature.  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  She has occasional alcohol.  She has one cup of coffee per day.  There is no family history of premature cardiovascular disease.     She had a hysterectomy with Dr. Mason 2018.  She had a cholecystectomy 2019 and had a postop infection.       She continues to have stressors in life; her parents and  her in-laws have been ill and she also cares full-time for her son.  Her psychiatrist prescribed hydroxyzine which has been helping.  She saw the [facility] in 2022 for adrenaline surges; guanfacine was stopped and she was started on a diazepam patch which made her feel terrible.  That was stopped and she is now on guanfacine 2 mg twice daily.  Her amlodipine is now 10 mg daily.  She had bilateral meniscus repair in 2022 and has been having more leg swelling; she is using compression stockings.      Echo done 2/24/2023 shows ejection fraction 56- 60% with mild mitral and tricuspid insufficiency.      She had a nonischemic treadmill stress study done 2/24/2023, exercising on a Antony protocol for 6 minutes and 30 seconds, elevated blood pressure response to exercise.    Today Marycarmen is mostly feeling well. From a POTS standpoint she is very aware of her symptoms and can alleviate them with lying down before suffering syncope if they are ever severe. She has not passed out. She has difficulty increasing salt in her diet because it has raised her blood pressure. She is doing well with hydration. She was doing very well with exercise and did Pilates routinely, but now having severe lumbar back pain and has had steroid injection and ablation for this. It has really limited her physical activity. Her cholesterol was elevated on last check and she is following this with PCP they are going to recheck labs in October. Dietary changes were recommended.   Her 11 year old son is autistic and has had school difficulties and stress from this.   Mother is on Repatha with high blood pressure, high cholesterol father with 1 stent age 77.      Her blood pressure is elevated today in the office 159/93 and on my recheck 145/89.  Orthostatics done today show persistent elevated blood pressures lying 160/100 with a heart rate of 72, standing 140/80 with a heart rate of 85, and standing after 5 minutes 140/80.      Current  Outpatient Medications on File Prior to Visit   Medication Sig Dispense Refill    azelastine (ASTELIN) 0.1 % nasal spray       Cholecalciferol (Vitamin D3) 899807 UNIT/GM powder Use.      clobetasol (TEMOVATE) 0.05 % ointment       cycloSPORINE (RESTASIS) 0.05 % ophthalmic emulsion 1 drop 2 (Two) Times a Day.      diclofenac (VOLTAREN) 1 % gel gel Apply 4 g topically to the appropriate area as directed 4 (Four) Times a Day As Needed.      Fexofenadine HCl (ALLEGRA ALLERGY PO) Take 180 mg by mouth As Needed.      FLUoxetine (PROzac) 40 MG capsule Take 1 capsule by mouth Daily.      guanFACINE (TENEX) 2 MG tablet Take 1 tablet by mouth Daily.      levalbuterol (XOPENEX HFA) 45 MCG/ACT inhaler       lidocaine 3 % cream cream Apply 1 drop topically to the appropriate area as directed.      Melatonin 5 MG chewable tablet       mometasone (NASONEX) 50 MCG/ACT nasal spray       montelukast (SINGULAIR) 10 MG tablet TAKE ONE TABLET BY MOUTH EVERY NIGHT AT BEDTIME 90 tablet 1    Omega-3 Fatty Acids (Omega-3 Fish Oil) 500 MG capsule       omeprazole (priLOSEC) 20 MG capsule Take 1 capsule by mouth Daily. Indications: Gastroesophageal Reflux Disease      pramipexole (MIRAPEX) 0.5 MG tablet TAKE ONE TABLET BY MOUTH ONCE NIGHTLY 90 tablet 1    pregabalin (LYRICA) 75 MG capsule Take 1 capsule by mouth Daily.      PreviDent 5000 Sensitive 1.1-5 % paste Daily.      rizatriptan (Maxalt) 10 MG tablet Take 1 tablet by mouth 1 (One) Time As Needed for Migraine. May repeat in 2 hours if needed 30 tablet 1    Suvorexant (Belsomra) 5 MG tablet Take 2 tablets by mouth Daily.      Trelegy Ellipta 200-62.5-25 MCG/ACT inhaler Inhale 1 puff Daily.      vitamin B-12 (CYANOCOBALAMIN) 100 MCG tablet Take 3 tablets by mouth Daily.       No current facility-administered medications on file prior to visit.         Procedures    ECG 12 Lead    Date/Time: 9/10/2024 10:13 AM  Performed by: Alessia Lindquist APRN    Authorized by: Alessia Lidnquist  "APRN  Comparison: compared with previous ECG from 8/16/2023  Rhythm: sinus rhythm  BPM: 76              Objective:      Vitals:    09/10/24 0835   BP: 159/93   Pulse: 76   Weight: 94.8 kg (209 lb)   Height: 154.9 cm (61\")     Body mass index is 39.49 kg/m².  Wt Readings from Last 3 Encounters:   09/10/24 94.8 kg (209 lb)   09/06/24 95 kg (209 lb 6.4 oz)   07/26/24 90.7 kg (200 lb)         Constitutional:       Appearance: Healthy appearance. Not in distress.   Pulmonary:      Effort: Pulmonary effort is normal.      Breath sounds: Normal breath sounds.   Cardiovascular:      Normal rate. Regular rhythm.      Murmurs: There is no murmur.   Pulses:     Radial: 2+ bilaterally.     Dorsalis pedis: 2+ bilaterally.     Posterior tibial: 2+ bilaterally.  Edema:     Peripheral edema absent.   Skin:     General: Skin is warm and dry.   Neurological:      Mental Status: Alert and oriented to person, place and time.         Lab Review:      Lab Results   Component Value Date    TSH 2.300 09/01/2023       Lab Results   Component Value Date    CHLPL 225 (H) 04/10/2024    CHLPL 231 (H) 09/01/2023    CHLPL 293 (H) 02/23/2023     Lab Results   Component Value Date    TRIG 246 (H) 04/10/2024    TRIG 155 (H) 09/01/2023    TRIG 268 (H) 02/23/2023     Lab Results   Component Value Date    HDL 39 (L) 04/10/2024    HDL 49 09/01/2023    HDL 59 02/23/2023     Lab Results   Component Value Date     (H) 04/10/2024     (H) 09/01/2023     (H) 02/23/2023       Lab Results   Component Value Date    WBC 10.54 04/10/2024    HGB 13.3 04/10/2024    HCT 41.1 04/10/2024    MCV 93.0 04/10/2024     04/10/2024       Lab Results   Component Value Date    GLUCOSE 100 (H) 04/10/2024    BUN 7 04/10/2024    CREATININE 0.83 04/10/2024    EGFRRESULT 91.0 04/10/2024    BCR 8.4 04/10/2024    K 4.5 04/10/2024    CO2 24.8 04/10/2024    CALCIUM 9.5 04/10/2024    PROTENTOTREF 6.5 04/10/2024    ALBUMIN 4.3 04/10/2024    BILITOT 0.6 " 04/10/2024    AST 26 04/10/2024    ALT 33 04/10/2024       Lab Results   Component Value Date    HGBA1C 5.80 (H) 04/10/2024       Assessment:     1. POTS (postural orthostatic tachycardia syndrome)    2. Essential hypertension    3. Familial hypercholesterolemia      POTS, symptoms are well-managed with positional maneuvers, fluid intake, limited salt intake due to her high blood pressure  Pressure is elevated today we did check orthostatics and it remained elevated.  Adding metoprolol for both her POTS and hypertension  Hyperlipidemia, familial she is working on dietary changes following with primary care we discussed today this may not be sufficient to treat her elevated cholesterol she is having labs checked again in October if it remains high I do recommend statin therapy.  Joint hypermobility  Lumbar back pain limiting physical activity having steroid injection, ablation      Plan:     We are adding metoprolol for her elevated blood pressure, tachycardia  She has had intolerance to other blood pressure medications: Clonidine, amlodipine we will see how she does with this we did discuss some other options if we need to change this medication today.  She will come back in 6 weeks      Return in about 6 weeks (around 10/22/2024) for Alessia Lindquist.          Thank you for allowing me to participate in this patient's care. Please call with any questions or concerns.          Dragon dictation device was utilized in this note.

## 2024-09-25 ENCOUNTER — PATIENT MESSAGE (OUTPATIENT)
Dept: CARDIOLOGY | Facility: CLINIC | Age: 39
End: 2024-09-25
Payer: MEDICARE

## 2024-10-10 ENCOUNTER — TELEPHONE (OUTPATIENT)
Dept: FAMILY MEDICINE CLINIC | Facility: CLINIC | Age: 39
End: 2024-10-10
Payer: MEDICARE

## 2024-10-10 NOTE — TELEPHONE ENCOUNTER
10/10/24  [phone] call back for [place] on behalf of jody.     10/11/2024  Finished seeing patients after 6:00 today, will contact next week.

## 2024-10-14 ENCOUNTER — TELEPHONE (OUTPATIENT)
Dept: FAMILY MEDICINE CLINIC | Facility: CLINIC | Age: 39
End: 2024-10-14
Payer: MEDICARE

## 2024-10-14 DIAGNOSIS — R73.9 HYPERGLYCEMIA: Primary | ICD-10-CM

## 2024-10-14 DIAGNOSIS — F43.0 STRESS REACTION: ICD-10-CM

## 2024-10-14 DIAGNOSIS — E78.01 FAMILIAL HYPERCHOLESTEROLEMIA: ICD-10-CM

## 2024-10-14 DIAGNOSIS — E88.819 INSULIN RESISTANCE: ICD-10-CM

## 2024-10-14 DIAGNOSIS — Q79.60 EHLERS-DANLOS SYNDROME: ICD-10-CM

## 2024-10-14 DIAGNOSIS — Z13.29 SCREENING FOR THYROID DISORDER: ICD-10-CM

## 2024-10-14 DIAGNOSIS — I10 ESSENTIAL HYPERTENSION: ICD-10-CM

## 2024-10-14 NOTE — TELEPHONE ENCOUNTER
Caller: Marycarmen Gauthier    Relationship: Self    Best call back number: [phone]        What was the call regarding: PATIENT CALLING WANTED TO MOVE LAB APPOINTMENT UP TO ANOTHER DAY     APPOINTMENT IS FOR 10/16/24 PATIENT IS ASKING FOR ANOTHER DAY EARLY MORNING TUESDAY 10/15, THURSDAY 10/17, OR FRIDAY 10/18    DIDN'T SEE ACTIVE LABS IN THE CHART     PLEASE GIVE CALLBACK WITH APPOINTMENT

## 2024-10-19 LAB
ALBUMIN SERPL-MCNC: 4.4 G/DL (ref 3.9–4.9)
ALP SERPL-CCNC: 89 IU/L (ref 44–121)
ALT SERPL-CCNC: 40 IU/L (ref 0–32)
AST SERPL-CCNC: 32 IU/L (ref 0–40)
BASOPHILS # BLD AUTO: 0 X10E3/UL (ref 0–0.2)
BASOPHILS NFR BLD AUTO: 0 %
BILIRUB SERPL-MCNC: 0.5 MG/DL (ref 0–1.2)
BUN SERPL-MCNC: 8 MG/DL (ref 6–24)
BUN/CREAT SERPL: 12 (ref 9–23)
CALCIUM SERPL-MCNC: 9.2 MG/DL (ref 8.7–10.2)
CHLORIDE SERPL-SCNC: 101 MMOL/L (ref 96–106)
CHOLEST SERPL-MCNC: 214 MG/DL (ref 100–199)
CO2 SERPL-SCNC: 24 MMOL/L (ref 20–29)
CREAT SERPL-MCNC: 0.69 MG/DL (ref 0.57–1)
EGFRCR SERPLBLD CKD-EPI 2021: 112 ML/MIN/1.73
EOSINOPHIL # BLD AUTO: 0.1 X10E3/UL (ref 0–0.4)
EOSINOPHIL NFR BLD AUTO: 1 %
ERYTHROCYTE [DISTWIDTH] IN BLOOD BY AUTOMATED COUNT: 12.1 % (ref 11.7–15.4)
GLOBULIN SER CALC-MCNC: 2.5 G/DL (ref 1.5–4.5)
GLUCOSE SERPL-MCNC: 81 MG/DL (ref 70–99)
HBA1C MFR BLD: 5.5 % (ref 4.8–5.6)
HCT VFR BLD AUTO: 41.8 % (ref 34–46.6)
HDLC SERPL-MCNC: 46 MG/DL
HGB BLD-MCNC: 13.6 G/DL (ref 11.1–15.9)
IMM GRANULOCYTES # BLD AUTO: 0 X10E3/UL (ref 0–0.1)
IMM GRANULOCYTES NFR BLD AUTO: 0 %
LDLC SERPL CALC-MCNC: 138 MG/DL (ref 0–99)
LDLC/HDLC SERPL: 3 RATIO (ref 0–3.2)
LYMPHOCYTES # BLD AUTO: 3.8 X10E3/UL (ref 0.7–3.1)
LYMPHOCYTES NFR BLD AUTO: 37 %
MCH RBC QN AUTO: 30.4 PG (ref 26.6–33)
MCHC RBC AUTO-ENTMCNC: 32.5 G/DL (ref 31.5–35.7)
MCV RBC AUTO: 93 FL (ref 79–97)
MONOCYTES # BLD AUTO: 0.7 X10E3/UL (ref 0.1–0.9)
MONOCYTES NFR BLD AUTO: 7 %
NEUTROPHILS # BLD AUTO: 5.6 X10E3/UL (ref 1.4–7)
NEUTROPHILS NFR BLD AUTO: 55 %
PLATELET # BLD AUTO: 376 X10E3/UL (ref 150–450)
POTASSIUM SERPL-SCNC: 4.2 MMOL/L (ref 3.5–5.2)
PROT SERPL-MCNC: 6.9 G/DL (ref 6–8.5)
RBC # BLD AUTO: 4.48 X10E6/UL (ref 3.77–5.28)
SODIUM SERPL-SCNC: 139 MMOL/L (ref 134–144)
TRIGL SERPL-MCNC: 169 MG/DL (ref 0–149)
TSH SERPL DL<=0.005 MIU/L-ACNC: 0.95 UIU/ML (ref 0.45–4.5)
VLDLC SERPL CALC-MCNC: 30 MG/DL (ref 5–40)
WBC # BLD AUTO: 10.2 X10E3/UL (ref 3.4–10.8)

## 2024-10-22 ENCOUNTER — OFFICE VISIT (OUTPATIENT)
Dept: CARDIOLOGY | Facility: CLINIC | Age: 39
End: 2024-10-22
Payer: MEDICARE

## 2024-10-22 VITALS — WEIGHT: 210 LBS | BODY MASS INDEX: 39.65 KG/M2 | HEIGHT: 61 IN

## 2024-10-22 DIAGNOSIS — G90.A POTS (POSTURAL ORTHOSTATIC TACHYCARDIA SYNDROME): Primary | ICD-10-CM

## 2024-10-22 DIAGNOSIS — E78.01 FAMILIAL HYPERCHOLESTEROLEMIA: ICD-10-CM

## 2024-10-22 DIAGNOSIS — I10 ESSENTIAL HYPERTENSION: ICD-10-CM

## 2024-10-22 PROCEDURE — 99214 OFFICE O/P EST MOD 30 MIN: CPT | Performed by: FAMILY MEDICINE

## 2024-10-22 PROCEDURE — 1159F MED LIST DOCD IN RCRD: CPT | Performed by: FAMILY MEDICINE

## 2024-10-22 PROCEDURE — 1160F RVW MEDS BY RX/DR IN RCRD: CPT | Performed by: FAMILY MEDICINE

## 2024-10-22 PROCEDURE — 93000 ELECTROCARDIOGRAM COMPLETE: CPT | Performed by: FAMILY MEDICINE

## 2024-10-22 NOTE — PROGRESS NOTES
Date of Office Visit: 10/22/2024  Encounter Provider: MAGGI Marquez  Place of Service: [facility] CARDIOLOGY  Established cardiologist: Jazz Hardin MD  Patient Name: Marycarmen Gauthier  :1985      Patient ID:  Marycarmen Gauthier is a 39 y.o. female is here for  followup    With a pertinent medical history of hypertension, fibromyalgia, hypermobile joints/sicca syndrome (Dr. Crook), posttraumatic stress disorder, anxiety, Lyme disease, hysterectomy 2018. POTS and Lisbeth-Danlos syndrome III     History of Present Illness  She had a tilt table study done 17 which showed inappropriate sinus tachycardia but no evidence of vasovagal syncope.  Tachycardia occurred after nitroglycerin.     She was seen at the [facility] 2018.  She went there for POTS.  In , she had premature rupture of membranes and had her son prematurely at 29 weeks.  He was in the  intensive care unit for 3 months.  A after that, she began having severe anxiety.  Her autonomic testing done at [facility] showed a normal QSART-no peripheral autonomic neuropathy.  Her tilt table study showed postural tachycardia but stable blood pressure.  They thought she had postural orthostatic tachycardia syndrome and recommended exercise and consideration of beta blockers.  In addition they recommended 3-5 g of sodium a day, to 2.5 L of fluid per day and sleeping with her head raised 6-10 inches as well as compression stockings avoiding alcohol and large meals.     She is  and has one child who was born premature.  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  She has occasional alcohol.  She has one cup of coffee per day.  There is no family history of premature cardiovascular disease.     She had a hysterectomy with Dr. Msaon 2018.  She had a cholecystectomy 2019 and had a postop infection.       She continues to have stressors in life; her parents and  her in-laws have been ill and she also cares full-time for her son.  Her psychiatrist prescribed hydroxyzine which has been helping. She saw the [place] [facility] in 2022 for adrenaline surges; guanfacine was stopped and she was started on a diazepam patch which made her feel terrible.  That was stopped and she is now on guanfacine 2 mg twice daily.  Her amlodipine is now 10 mg daily.  She had bilateral meniscus repair in 2022 and has been having more leg swelling; she is using compression stockings.      Echo done 2/24/2023 shows ejection fraction 56- 60% with mild mitral and tricuspid insufficiency.       She had a nonischemic treadmill stress study done 2/24/2023, exercising on a Antony protocol for 6 minutes and 30 seconds, elevated blood pressure response to exercise.     I saw Marycarmen in the office 9/10/2024.  She was having high blood pressures.  We did do her orthostatics in the office and her blood pressure remained elevated.  I added metoprolol to tartrate 25 mg twice daily.    Today Marycarmen tells me she has done very well on the twice daily metoprolol.  Her home blood pressures have really been controlled and she has not been experiencing heart racing.  She does wear a smart watch and overall her trend is heart rate has been lower range of normal.  She has also noticed some improvement in headaches.  She has not had any chest pain or pressure, shortness of breath/VICKERS, lightheadedness, dizziness, presyncope/syncope, or leg swelling that has increased or changed from her baseline.     Current Outpatient Medications on File Prior to Visit   Medication Sig Dispense Refill    azelastine (ASTELIN) 0.1 % nasal spray       Cholecalciferol (Vitamin D3) 770495 UNIT/GM powder Use.      clobetasol (TEMOVATE) 0.05 % ointment       diclofenac (VOLTAREN) 1 % gel gel Apply 4 g topically to the appropriate area as directed 4 (Four) Times a Day As Needed.      Fexofenadine HCl (ALLEGRA ALLERGY PO) Take 180 mg by  "mouth As Needed.      FLUoxetine (PROzac) 40 MG capsule Take 1 capsule by mouth Daily.      guanFACINE (TENEX) 2 MG tablet Take 1 tablet by mouth Daily.      levalbuterol (XOPENEX HFA) 45 MCG/ACT inhaler       lidocaine 3 % cream cream Apply 1 drop topically to the appropriate area as directed.      Melatonin 5 MG chewable tablet       metoprolol tartrate (LOPRESSOR) 25 MG tablet Take 1 tablet by mouth 2 (Two) Times a Day. 60 tablet 11    mometasone (NASONEX) 50 MCG/ACT nasal spray       montelukast (SINGULAIR) 10 MG tablet TAKE ONE TABLET BY MOUTH EVERY NIGHT AT BEDTIME 90 tablet 1    Omega-3 Fatty Acids (Omega-3 Fish Oil) 500 MG capsule       omeprazole (priLOSEC) 20 MG capsule Take 1 capsule by mouth Daily. Indications: Gastroesophageal Reflux Disease      pramipexole (MIRAPEX) 0.5 MG tablet TAKE ONE TABLET BY MOUTH ONCE NIGHTLY 90 tablet 1    pregabalin (LYRICA) 75 MG capsule Take 1 capsule by mouth Daily.      PreviDent 5000 Sensitive 1.1-5 % paste Daily.      rizatriptan (Maxalt) 10 MG tablet Take 1 tablet by mouth 1 (One) Time As Needed for Migraine. May repeat in 2 hours if needed 30 tablet 1    Suvorexant (Belsomra) 5 MG tablet Take 2 tablets by mouth Daily.      vitamin B-12 (CYANOCOBALAMIN) 100 MCG tablet Take 3 tablets by mouth Daily.      Trelegy Ellipta 200-62.5-25 MCG/ACT inhaler Inhale 1 puff Daily.      [DISCONTINUED] cycloSPORINE (RESTASIS) 0.05 % ophthalmic emulsion 1 drop 2 (Two) Times a Day.       No current facility-administered medications on file prior to visit.         Procedures    ECG 12 Lead    Date/Time: 10/22/2024 2:03 PM  Performed by: Alessia Lindquist APRN    Authorized by: Alessia Lindquist APRN  Comparison: compared with previous ECG from 9/10/2024  Rhythm: sinus rhythm  BPM: 80            Objective:      Vitals:    10/22/24 1334   Weight: 95.3 kg (210 lb)   Height: 154.9 cm (61\")     Body mass index is 39.68 kg/m².  Wt Readings from Last 3 Encounters:   10/22/24 95.3 kg (210 " lb)   09/10/24 94.8 kg (209 lb)   09/06/24 95 kg (209 lb 6.4 oz)       Constitutional:       Appearance: Overweight, otherwise healthy appearance. Not in distress.   Pulmonary:      Effort: Pulmonary effort is normal.      Breath sounds: Normal breath sounds.   Cardiovascular:      Normal rate. Regular rhythm.      Murmurs: There is no murmur.   Pulses:     Radial: 2+ bilaterally.     Dorsalis pedis: 2+ bilaterally.     Posterior tibial: 2+ bilaterally.  Edema:     Peripheral edema absent.   Skin:     General: Skin is warm and dry.   Neurological:      Mental Status: Alert and oriented to person, place and time.           Lab Review:      Lab Results   Component Value Date    TSH 0.951 10/18/2024       Lab Results   Component Value Date    CHLPL 214 (H) 10/18/2024    CHLPL 225 (H) 04/10/2024    CHLPL 231 (H) 09/01/2023     Lab Results   Component Value Date    TRIG 169 (H) 10/18/2024    TRIG 246 (H) 04/10/2024    TRIG 155 (H) 09/01/2023     Lab Results   Component Value Date    HDL 46 10/18/2024    HDL 39 (L) 04/10/2024    HDL 49 09/01/2023     Lab Results   Component Value Date     (H) 10/18/2024     (H) 04/10/2024     (H) 09/01/2023       Lab Results   Component Value Date    WBC 10.2 10/18/2024    HGB 13.6 10/18/2024    HCT 41.8 10/18/2024    MCV 93 10/18/2024     10/18/2024       Lab Results   Component Value Date    GLUCOSE 81 10/18/2024    BUN 8 10/18/2024    CREATININE 0.69 10/18/2024    EGFRRESULT 112 10/18/2024    BCR 12 10/18/2024    K 4.2 10/18/2024    CO2 24 10/18/2024    CALCIUM 9.2 10/18/2024    PROTENTOTREF 6.9 10/18/2024    ALBUMIN 4.4 10/18/2024    BILITOT 0.5 10/18/2024    AST 32 10/18/2024    ALT 40 (H) 10/18/2024       Lab Results   Component Value Date    HGBA1C 5.5 10/18/2024       Assessment:     1. POTS (postural orthostatic tachycardia syndrome)    2. Essential hypertension    3. Familial hypercholesterolemia    POTS symptoms continue to be well-managed with  positional maneuvers, fluid intake, limited salt intake due to high blood pressure, and now twice daily metoprolol tartrate 25 mg.  Blood pressure is controlled and improved after starting metoprolol tartrate 25 mg twice daily.  Hyperlipidemia, familial, working on dietary changes and following with PCP for this.  Joint hypermobility  Lumbar back pain limiting physical activity with steroid injection, ablation in past    Plan:   No medication changes were made  Doing well on addition of beta-blocker therapy  Offered 3 or 6-month follow-up as she is stable she would like to return in 3 months-we will arrange that  Return in about 3 months (around 1/22/2025) for Alessia Lindquist or Dr. Hardin .        Thank you for allowing me to participate in this patient's care. Please call with any questions or concerns.          Dragon dictation device was utilized in this note.

## 2024-10-25 ENCOUNTER — OFFICE VISIT (OUTPATIENT)
Dept: FAMILY MEDICINE CLINIC | Facility: CLINIC | Age: 39
End: 2024-10-25
Payer: MEDICARE

## 2024-10-25 VITALS
OXYGEN SATURATION: 100 % | BODY MASS INDEX: 39.84 KG/M2 | SYSTOLIC BLOOD PRESSURE: 118 MMHG | DIASTOLIC BLOOD PRESSURE: 82 MMHG | TEMPERATURE: 97.8 F | HEART RATE: 76 BPM | WEIGHT: 211 LBS | HEIGHT: 61 IN

## 2024-10-25 DIAGNOSIS — G89.29 CHRONIC BILATERAL LOW BACK PAIN WITHOUT SCIATICA: ICD-10-CM

## 2024-10-25 DIAGNOSIS — I10 ESSENTIAL HYPERTENSION: ICD-10-CM

## 2024-10-25 DIAGNOSIS — M24.80 HYPERMOBILE JOINT SYNDROME OF MULTIPLE SITES: ICD-10-CM

## 2024-10-25 DIAGNOSIS — E66.01 CLASS 2 SEVERE OBESITY DUE TO EXCESS CALORIES WITH SERIOUS COMORBIDITY AND BODY MASS INDEX (BMI) OF 38.0 TO 38.9 IN ADULT: ICD-10-CM

## 2024-10-25 DIAGNOSIS — R73.9 HYPERGLYCEMIA: Primary | ICD-10-CM

## 2024-10-25 DIAGNOSIS — M54.50 CHRONIC BILATERAL LOW BACK PAIN WITHOUT SCIATICA: ICD-10-CM

## 2024-10-25 DIAGNOSIS — E66.812 CLASS 2 SEVERE OBESITY DUE TO EXCESS CALORIES WITH SERIOUS COMORBIDITY AND BODY MASS INDEX (BMI) OF 38.0 TO 38.9 IN ADULT: ICD-10-CM

## 2024-10-25 PROCEDURE — 99214 OFFICE O/P EST MOD 30 MIN: CPT | Performed by: FAMILY MEDICINE

## 2024-10-25 PROCEDURE — 1125F AMNT PAIN NOTED PAIN PRSNT: CPT | Performed by: FAMILY MEDICINE

## 2024-10-25 PROCEDURE — 3079F DIAST BP 80-89 MM HG: CPT | Performed by: FAMILY MEDICINE

## 2024-10-25 PROCEDURE — 1160F RVW MEDS BY RX/DR IN RCRD: CPT | Performed by: FAMILY MEDICINE

## 2024-10-25 PROCEDURE — 1159F MED LIST DOCD IN RCRD: CPT | Performed by: FAMILY MEDICINE

## 2024-10-25 PROCEDURE — 3074F SYST BP LT 130 MM HG: CPT | Performed by: FAMILY MEDICINE

## 2024-10-25 RX ORDER — FLUTICASONE PROPIONATE AND SALMETEROL XINAFOATE 230; 21 UG/1; UG/1
2 AEROSOL, METERED RESPIRATORY (INHALATION)
COMMUNITY
Start: 2024-10-14

## 2024-10-25 RX ORDER — BUPROPION HYDROCHLORIDE 150 MG/1
150 TABLET ORAL EVERY MORNING
COMMUNITY
Start: 2024-10-10 | End: 2025-04-08

## 2024-10-25 NOTE — PROGRESS NOTES
"Chief Complaint  Hyperglycemia (Had labs prior  no complains ) obesity lower back pain, EDS    Subjective        Marycarmen Gauthier presents to [facility]  Hyperglycemia      Obesity not well controlled and gaining     Lower back pain, over all doing well and having an ablation that has helped tremendously,     EDS: She carried groceries in their house at her parents and then had golf and tennis elbow, had shots and braces and flairs her helps her elbows.  She had nother situation with carrying groceries in her house again due to not having someone else to carry them in and now has a flair in her elbows on both sides.  Now doing voltaren and elbow braces.     Objective   Vital Signs:  /82   Pulse 76   Temp 97.8 °F (36.6 °C)   Ht 154.9 cm (61\")   Wt 95.7 kg (211 lb)   SpO2 100%   BMI 39.87 kg/m²   Estimated body mass index is 39.87 kg/m² as calculated from the following:    Height as of this encounter: 154.9 cm (61\").    Weight as of this encounter: 95.7 kg (211 lb).    Physical Exam  Vitals and nursing note reviewed.   Constitutional:       General: She is not in acute distress.     Appearance: She is well-developed.   HENT:      Head: Normocephalic.      Nose: Nose normal.   Cardiovascular:      Rate and Rhythm: Normal rate and regular rhythm.      Heart sounds: Normal heart sounds. No murmur heard.  Pulmonary:      Effort: Pulmonary effort is normal. No respiratory distress.      Breath sounds: Normal breath sounds.   Musculoskeletal:         General: Normal range of motion.   Skin:     General: Skin is warm and dry.      Findings: No rash.   Neurological:      Mental Status: She is alert and oriented to person, place, and time.   Psychiatric:         Behavior: Behavior normal.         Thought Content: Thought content normal.         Judgment: Judgment normal.        Result Review :  The following data was reviewed by: Hellen Lagunas MD on 10/25/2024:         TSH Rfx On " Abnormal To Free T4 (10/18/2024 13:07)  Hemoglobin A1c (10/18/2024 13:07)  Lipid Panel With LDL / HDL Ratio (10/18/2024 13:07)  Comprehensive Metabolic Panel (10/18/2024 13:07)  CBC & Differential (10/18/2024 13:07)     Assessment and Plan   Diagnoses and all orders for this visit:    1. Hyperglycemia (Primary)    2. Essential hypertension    3. Class 2 severe obesity due to excess calories with serious comorbidity and body mass index (BMI) of 38.0 to 38.9 in adult  Assessment & Plan:  Patient's (Body mass index is 39.87 kg/m².) indicates that they are morbidly/severely obese (BMI > 40 or > 35 with obesity - related health condition) with health conditions that include impaired fasting glucose, dyslipidemias, and osteoarthritis . Weight is unchanged. BMI  is above average; BMI management plan is completed. We discussed portion control and increasing exercise.     Orders:  -     Semaglutide-Weight Management 0.25 MG/0.5ML solution auto-injector; Inject 0.5 mL under the skin into the appropriate area as directed 1 (One) Time Per Week. OK to compound with B12  Dispense: 2 mL; Refill: 2    4. Chronic bilateral low back pain without sciatica    5. Hypermobile joint syndrome of multiple sites    Pleasant 39-year-old female to discuss multiple concerns, hypertension well-controlled hyperglycemia improving, she is working very hard to make healthy lifestyle choices but is still struggling to lose weight which has been very frustrating, her goal is to be under 200 pounds as this is substantially better for her back, she has had a nerve ablation with her back which has been helpful she is not pain-free but able to walk.    She does have multiple arthritic and areas of joint pain that can be precipitated with out any substantial work.  For instance she has had 2 episodes now where she has triggered both golf and tennis elbow on both elbows at the same time with simply carrying some light groceries into the house.  She will  have moments of substantial pain or even postural tachycardia that occur when seated and will need to lay down, she has been doing much better on current medications overall but still does not have complete resolution of symptoms which will flare on occasion.    We spent some time talking through weight loss which I think will help all of her symptoms although I do not think they are the ultimate cause as they were present prior to gaining weight.  But it definitely does not help, plan to restart semaglutide she has been on Ozempic twice before with improvement and felt much better in terms of energy.  Due to formulary requirements will transition to a compounding pharmacy risk benefits discussed, consent form signed.  Will start with low dose and taper up gradually.         Follow Up   Return in about 3 months (around 1/25/2025), or if symptoms worsen or fail to improve, for Recheck weight loss.  Patient was given instructions and counseling regarding her condition or for health maintenance advice. Please see specific information pulled into the AVS if appropriate.

## 2024-10-25 NOTE — ASSESSMENT & PLAN NOTE
Patient's (Body mass index is 39.87 kg/m².) indicates that they are morbidly/severely obese (BMI > 40 or > 35 with obesity - related health condition) with health conditions that include impaired fasting glucose, dyslipidemias, and osteoarthritis . Weight is unchanged. BMI  is above average; BMI management plan is completed. We discussed portion control and increasing exercise.

## 2024-10-30 ENCOUNTER — TELEPHONE (OUTPATIENT)
Dept: FAMILY MEDICINE CLINIC | Facility: CLINIC | Age: 39
End: 2024-10-30
Payer: MEDICARE

## 2024-10-30 ENCOUNTER — PATIENT MESSAGE (OUTPATIENT)
Dept: FAMILY MEDICINE CLINIC | Facility: CLINIC | Age: 39
End: 2024-10-30
Payer: MEDICARE

## 2024-10-30 DIAGNOSIS — E66.812 CLASS 2 SEVERE OBESITY DUE TO EXCESS CALORIES WITH SERIOUS COMORBIDITY AND BODY MASS INDEX (BMI) OF 38.0 TO 38.9 IN ADULT: ICD-10-CM

## 2024-10-30 DIAGNOSIS — E66.01 CLASS 2 SEVERE OBESITY DUE TO EXCESS CALORIES WITH SERIOUS COMORBIDITY AND BODY MASS INDEX (BMI) OF 38.0 TO 38.9 IN ADULT: ICD-10-CM

## 2024-10-30 NOTE — TELEPHONE ENCOUNTER
A representative from Dr. Monroy's office called, after reviewing patients disability claim it looks like it will require a peer to peer. Callback number is [phone].

## 2024-10-31 NOTE — TELEPHONE ENCOUNTER
Called and left a voicemail to call back tomorrow, I am available during the lunch break or after 5 PM.

## 2024-11-06 ENCOUNTER — OFFICE VISIT (OUTPATIENT)
Dept: FAMILY MEDICINE CLINIC | Facility: CLINIC | Age: 39
End: 2024-11-06
Payer: MEDICARE

## 2024-11-06 VITALS
WEIGHT: 210 LBS | HEART RATE: 81 BPM | SYSTOLIC BLOOD PRESSURE: 130 MMHG | BODY MASS INDEX: 39.65 KG/M2 | HEIGHT: 61 IN | DIASTOLIC BLOOD PRESSURE: 82 MMHG | OXYGEN SATURATION: 99 % | TEMPERATURE: 98 F

## 2024-11-06 DIAGNOSIS — J45.41 MODERATE PERSISTENT ASTHMA WITH EXACERBATION: ICD-10-CM

## 2024-11-06 DIAGNOSIS — R05.1 ACUTE COUGH: Primary | ICD-10-CM

## 2024-11-06 LAB
EXPIRATION DATE: NORMAL
FLUAV AG UPPER RESP QL IA.RAPID: NOT DETECTED
FLUBV AG UPPER RESP QL IA.RAPID: NOT DETECTED
INTERNAL CONTROL: NORMAL
Lab: NORMAL
SARS-COV-2 AG UPPER RESP QL IA.RAPID: NOT DETECTED

## 2024-11-06 PROCEDURE — 1160F RVW MEDS BY RX/DR IN RCRD: CPT | Performed by: NURSE PRACTITIONER

## 2024-11-06 PROCEDURE — 1125F AMNT PAIN NOTED PAIN PRSNT: CPT | Performed by: NURSE PRACTITIONER

## 2024-11-06 PROCEDURE — 1159F MED LIST DOCD IN RCRD: CPT | Performed by: NURSE PRACTITIONER

## 2024-11-06 PROCEDURE — 87428 SARSCOV & INF VIR A&B AG IA: CPT | Performed by: NURSE PRACTITIONER

## 2024-11-06 PROCEDURE — 3079F DIAST BP 80-89 MM HG: CPT | Performed by: NURSE PRACTITIONER

## 2024-11-06 PROCEDURE — 99213 OFFICE O/P EST LOW 20 MIN: CPT | Performed by: NURSE PRACTITIONER

## 2024-11-06 PROCEDURE — 3075F SYST BP GE 130 - 139MM HG: CPT | Performed by: NURSE PRACTITIONER

## 2024-11-06 RX ORDER — PREDNISONE 20 MG/1
20 TABLET ORAL DAILY
Qty: 5 TABLET | Refills: 0 | Status: SHIPPED | OUTPATIENT
Start: 2024-11-06 | End: 2024-11-11

## 2024-11-06 NOTE — PROGRESS NOTES
"Chief Complaint  Cough (Sore throat and fatigue Sun, dry cough Mon, difficulty taking deep breath yesterday. Denies fever. )    Subjective        Marycarmen Gauthier presents to [facility]  Cough  Chronicity: 2-3 days ago. The cough is Dry. Associated symptoms include myalgias, a sore throat and shortness of breath. Pertinent negatives include no chills, ear congestion, ear pain, fever, headaches, nasal congestion, rhinorrhea or wheezing. Associated symptoms comments: fatigue. She has tried a beta-agonist inhaler (Tylenol and Advil) for the symptoms. The treatment provided no relief. Her past medical history is significant for asthma.   Used rescue inhaler more this weekend. No shortness of breath with lying down.     Objective   Vital Signs:  /82   Pulse 81   Temp 98 °F (36.7 °C)   Ht 154.9 cm (60.98\")   Wt 95.3 kg (210 lb)   SpO2 99%   BMI 39.70 kg/m²   Estimated body mass index is 39.7 kg/m² as calculated from the following:    Height as of this encounter: 154.9 cm (60.98\").    Weight as of this encounter: 95.3 kg (210 lb).            Physical Exam  Constitutional:       General: She is not in acute distress.     Appearance: Normal appearance. She is ill-appearing. She is not toxic-appearing or diaphoretic.   HENT:      Head: Normocephalic and atraumatic.   Eyes:      General: No scleral icterus.        Right eye: No discharge.         Left eye: No discharge.      Extraocular Movements: Extraocular movements intact.   Pulmonary:      Effort: Pulmonary effort is normal. No respiratory distress.      Breath sounds: No stridor. No wheezing or rhonchi.   Neurological:      General: No focal deficit present.      Mental Status: She is alert and oriented to person, place, and time.      Motor: No weakness.      Gait: Gait normal.   Psychiatric:         Mood and Affect: Mood normal.         Behavior: Behavior normal.        Result Review :         POCT SARS-CoV-2 Antigen KYLE + Flu " (11/06/2024 11:33)          Assessment and Plan   Diagnoses and all orders for this visit:    1. Acute cough (Primary)  -     POCT SARS-CoV-2 Antigen KYLE + Flu  -     predniSONE (DELTASONE) 20 MG tablet; Take 1 tablet by mouth Daily for 5 days.  Dispense: 5 tablet; Refill: 0    2. Moderate persistent asthma with exacerbation  -     predniSONE (DELTASONE) 20 MG tablet; Take 1 tablet by mouth Daily for 5 days.  Dispense: 5 tablet; Refill: 0      COVID-19 and influenza testing negative. Lung sounds are clear. Recommended starting oral steroid if symptoms do not improve. Continue albuterol nebulizer as needed. Follow-up if symptoms worsen or do not improve.          Follow Up   Return if symptoms worsen or fail to improve.  Patient was given instructions and counseling regarding her condition or for health maintenance advice. Please see specific information pulled into the AVS if appropriate.         Electronically signed by MAGGI Malagon, 11/06/24, 12:13 PM EST.

## 2024-11-13 ENCOUNTER — TELEPHONE (OUTPATIENT)
Dept: FAMILY MEDICINE CLINIC | Facility: CLINIC | Age: 39
End: 2024-11-13
Payer: MEDICARE

## 2024-11-13 NOTE — TELEPHONE ENCOUNTER
Josselyn representative for [employer], called to set up a peer to peer with Dr. Lagunas to discuss patients disability claim. To set up peer to peer clinical can contact [phone].      Case Number: [id number]

## 2024-11-14 NOTE — TELEPHONE ENCOUNTER
Called back, spoke with a representative, they reached out to the physician reviewing the case and left a voicemail

## 2024-11-15 ENCOUNTER — PATIENT MESSAGE (OUTPATIENT)
Dept: FAMILY MEDICINE CLINIC | Facility: CLINIC | Age: 39
End: 2024-11-15
Payer: MEDICARE

## 2024-11-15 NOTE — TELEPHONE ENCOUNTER
I was called back by Dylon Rome but unfortunately Dr. Diehl was not available.  I did make them aware that this is now the 9th time waiting for the physician with being told that she is unavailable.

## 2024-11-15 NOTE — TELEPHONE ENCOUNTER
Called an 8th time.  Dr Anjana Esteban stated that she was not available until after 230, unfortunately I will be seeing patients until 430.  I did provide my cell phone number to try to make myself as available as possible to the patient, I did voice my frustration with having called 8 times now.  I did give my cell phone number and let them know that I can be available between 11 30-12 30 during the workday or after 430.  Otherwise I have an extremely full patient panel and not available by phone.

## 2024-11-19 NOTE — TELEPHONE ENCOUNTER
----- Message from Jeaneth Tariq sent at 10/1/2018 11:12 AM EDT -----  Contact: Patient   Checking on disability paperwork.  Please advise.     Patient:  [phone]  
Informed pt her paper work is complete and she can pick them up at the .  
(4) rarely moist

## 2024-11-21 PROBLEM — Z86.19 H/O INFECTIOUS MONONUCLEOSIS: Status: ACTIVE | Noted: 2019-08-24

## 2024-11-27 RX ORDER — PRAMIPEXOLE DIHYDROCHLORIDE 0.5 MG/1
TABLET ORAL
Qty: 90 TABLET | Refills: 1 | Status: SHIPPED | OUTPATIENT
Start: 2024-11-27

## 2024-12-06 ENCOUNTER — TELEPHONE (OUTPATIENT)
Dept: FAMILY MEDICINE CLINIC | Facility: CLINIC | Age: 39
End: 2024-12-06
Payer: MEDICARE

## 2024-12-06 NOTE — TELEPHONE ENCOUNTER
Caller: BETTYE    Relationship to patient: Provider    Best call back number:     [phone]       Patient is needing: PATIENT HAS APPLIED FOR DISABILITY AND THE PROVIDER IS A THIRD PARTY REVIEWER AND IS REQUESTING A PEER TO PEER. PLEASE ADVISE. THANKS. ZAYNAB 9-5 PACIFIC TIME PLEASE ADVISE. ONE WEEK FROM TODAY DUE DATE.

## 2024-12-09 NOTE — TELEPHONE ENCOUNTER
Unfortunately my schedule is extremely busy with patients I am not available until after 5 PM Tuesday, Thursday or Friday.  I have called potentially 9 times trying to get a hold of this provider previously.  I did give a written statement to 2 questions that I understand needed to be answered on the peer to peer a couple of weeks ago.

## 2024-12-13 NOTE — TELEPHONE ENCOUNTER
Left another voice message with detail about  dr Lagunas hours and message and she already called them several times and left voice messages  unfortunately we haven't had any response  back today another failed  call trying to set up  peer to peer

## 2025-01-07 ENCOUNTER — TELEPHONE (OUTPATIENT)
Dept: FAMILY MEDICINE CLINIC | Facility: CLINIC | Age: 40
End: 2025-01-07
Payer: MEDICARE

## 2025-01-07 NOTE — TELEPHONE ENCOUNTER
Caller: KRISTEN - DR HENSON    Relationship to patient:     Best call back number:     [phone]       Patient is needing:   PATIENT FILED FOR DISABILITY, DR HENSON IS REQUESTING A PEER TO PEER REVIEW TO GO OVER ANY RESTRICTIONS OR LIMITATIONS.

## 2025-01-28 ENCOUNTER — OFFICE VISIT (OUTPATIENT)
Dept: FAMILY MEDICINE CLINIC | Facility: CLINIC | Age: 40
End: 2025-01-28
Payer: MEDICARE

## 2025-01-28 VITALS
DIASTOLIC BLOOD PRESSURE: 84 MMHG | WEIGHT: 210 LBS | TEMPERATURE: 93.7 F | BODY MASS INDEX: 39.65 KG/M2 | HEART RATE: 88 BPM | OXYGEN SATURATION: 100 % | HEIGHT: 61 IN | SYSTOLIC BLOOD PRESSURE: 134 MMHG

## 2025-01-28 DIAGNOSIS — I10 ESSENTIAL HYPERTENSION: ICD-10-CM

## 2025-01-28 DIAGNOSIS — E66.812 CLASS 2 SEVERE OBESITY DUE TO EXCESS CALORIES WITH SERIOUS COMORBIDITY AND BODY MASS INDEX (BMI) OF 38.0 TO 38.9 IN ADULT: Primary | ICD-10-CM

## 2025-01-28 DIAGNOSIS — R73.9 HYPERGLYCEMIA: ICD-10-CM

## 2025-01-28 DIAGNOSIS — R11.0 NAUSEA: ICD-10-CM

## 2025-01-28 DIAGNOSIS — E66.01 CLASS 2 SEVERE OBESITY DUE TO EXCESS CALORIES WITH SERIOUS COMORBIDITY AND BODY MASS INDEX (BMI) OF 38.0 TO 38.9 IN ADULT: Primary | ICD-10-CM

## 2025-01-28 PROCEDURE — 1159F MED LIST DOCD IN RCRD: CPT | Performed by: FAMILY MEDICINE

## 2025-01-28 PROCEDURE — 1126F AMNT PAIN NOTED NONE PRSNT: CPT | Performed by: FAMILY MEDICINE

## 2025-01-28 PROCEDURE — 3079F DIAST BP 80-89 MM HG: CPT | Performed by: FAMILY MEDICINE

## 2025-01-28 PROCEDURE — 1160F RVW MEDS BY RX/DR IN RCRD: CPT | Performed by: FAMILY MEDICINE

## 2025-01-28 PROCEDURE — 99214 OFFICE O/P EST MOD 30 MIN: CPT | Performed by: FAMILY MEDICINE

## 2025-01-28 PROCEDURE — 3075F SYST BP GE 130 - 139MM HG: CPT | Performed by: FAMILY MEDICINE

## 2025-01-28 NOTE — PROGRESS NOTES
"Chief Complaint  Weight Check (.25  is doing well still a little nausea started  a little over a month )    Subjective        Marycarmen Gauthier presents to [facility]  History of Present Illness  Pleasant 40-year-old female to follow-up for weight loss, currently on semaglutide 0.25 mg weekly.  She had some delay in starting, hr dad has been in poor health, he has had 4 admission and is not ready for hospice yet. Her son is not handling well and having a lot of regression.  Planning to get back to the gym as she hasn't for months.     She has been on 7 shots now and less with injection in the thigh than the abdomen, she is in a better spot.    Started dupixent at the ent, some cold but has not gotten really sick yet,     Objective   Vital Signs:  /84   Pulse 88   Temp 93.7 °F (34.3 °C)   Ht 154.9 cm (61\")   Wt 95.3 kg (210 lb)   SpO2 100%   BMI 39.68 kg/m²   Estimated body mass index is 39.68 kg/m² as calculated from the following:    Height as of this encounter: 154.9 cm (61\").    Weight as of this encounter: 95.3 kg (210 lb).      Physical Exam  Vitals and nursing note reviewed.   Constitutional:       General: She is not in acute distress.     Appearance: She is well-developed.   HENT:      Head: Normocephalic.      Nose: Nose normal.   Cardiovascular:      Rate and Rhythm: Normal rate and regular rhythm.      Heart sounds: Normal heart sounds. No murmur heard.  Pulmonary:      Effort: Pulmonary effort is normal. No respiratory distress.      Breath sounds: Normal breath sounds.   Musculoskeletal:         General: Normal range of motion.   Skin:     General: Skin is warm and dry.      Findings: No rash.   Neurological:      Mental Status: She is alert and oriented to person, place, and time.   Psychiatric:         Behavior: Behavior normal.         Thought Content: Thought content normal.         Judgment: Judgment normal.        Result Review :                Assessment " and Plan   Diagnoses and all orders for this visit:    1. Class 2 severe obesity due to excess calories with serious comorbidity and body mass index (BMI) of 38.0 to 38.9 in adult (Primary)  Assessment & Plan:  Patient's (There is no height or weight on file to calculate BMI.) indicates that they are morbidly/severely obese (BMI > 40 or > 35 with obesity - related health condition) with health conditions that include impaired fasting glucose, dyslipidemias, and osteoarthritis . Weight is unchanged. BMI  is above average; BMI management plan is completed.     Plan:  - Continue with lifestyle management including portion control and increasing exercise.  Especially with eating more protein and less carbohydrates  -Continue with pharmacotherapy, semaglutide 0.25 mg weekly  - Follow-up in 3 months with labs prior      Orders:  -     Semaglutide-Weight Management 0.25 MG/0.5ML solution auto-injector; Inject 0.5 mL under the skin into the appropriate area as directed 1 (One) Time Per Week. OK to compound with B12  Dispense: 2 mL; Refill: 2    2. Hyperglycemia  -     Hemoglobin A1c; Future    3. Essential hypertension  -     Comprehensive Metabolic Panel; Future  -     CBC & Differential; Future  -     Lipid Panel; Future  -     TSH; Future  -     T4, free; Future    4. Nausea  -     Lipase; Future           Follow Up   Return in about 3 months (around 4/28/2025), or if symptoms worsen or fail to improve, for Recheck weight loss and labs prior .  Patient was given instructions and counseling regarding her condition or for health maintenance advice. Please see specific information pulled into the AVS if appropriate.

## 2025-01-28 NOTE — ASSESSMENT & PLAN NOTE
Patient's (There is no height or weight on file to calculate BMI.) indicates that they are morbidly/severely obese (BMI > 40 or > 35 with obesity - related health condition) with health conditions that include impaired fasting glucose, dyslipidemias, and osteoarthritis . Weight is unchanged. BMI  is above average; BMI management plan is completed.     Plan:  - Continue with lifestyle management including portion control and increasing exercise.  Especially with eating more protein and less carbohydrates  -Continue with pharmacotherapy, semaglutide 0.25 mg weekly  - Follow-up in 3 months with labs prior

## 2025-02-25 ENCOUNTER — OFFICE VISIT (OUTPATIENT)
Dept: CARDIOLOGY | Facility: CLINIC | Age: 40
End: 2025-02-25
Payer: MEDICARE

## 2025-02-25 VITALS
BODY MASS INDEX: 39.68 KG/M2 | HEIGHT: 61 IN | HEART RATE: 84 BPM | OXYGEN SATURATION: 99 % | SYSTOLIC BLOOD PRESSURE: 129 MMHG | DIASTOLIC BLOOD PRESSURE: 87 MMHG

## 2025-02-25 DIAGNOSIS — G90.A POTS (POSTURAL ORTHOSTATIC TACHYCARDIA SYNDROME): Primary | ICD-10-CM

## 2025-02-25 DIAGNOSIS — E78.01 FAMILIAL HYPERCHOLESTEROLEMIA: ICD-10-CM

## 2025-02-25 DIAGNOSIS — I10 ESSENTIAL HYPERTENSION: ICD-10-CM

## 2025-02-25 PROCEDURE — 93000 ELECTROCARDIOGRAM COMPLETE: CPT | Performed by: FAMILY MEDICINE

## 2025-02-25 PROCEDURE — 3074F SYST BP LT 130 MM HG: CPT | Performed by: FAMILY MEDICINE

## 2025-02-25 PROCEDURE — 99214 OFFICE O/P EST MOD 30 MIN: CPT | Performed by: FAMILY MEDICINE

## 2025-02-25 PROCEDURE — 3078F DIAST BP <80 MM HG: CPT | Performed by: FAMILY MEDICINE

## 2025-02-25 NOTE — PROGRESS NOTES
Date of Office Visit: 2025  Encounter Provider: MAGGI Marquez  Place of Service: [facility] CARDIOLOGY  Established cardiologist: Jazz Hardin MD  Patient Name: Marycarmen Gauthier  :1985      Patient ID:  Marycarmen Gauthier is a 40 y.o. female is here for  followup    With a pertinent medical history of hypertension, fibromyalgia, hypermobile joints/sicca syndrome (Dr. Crook), posttraumatic stress disorder, anxiety, Lyme disease, hysterectomy 2018. POTS and Lisbeth-Danlos syndrome III     History of Present Illness  She had a tilt table study done 17 which showed inappropriate sinus tachycardia but no evidence of vasovagal syncope.  Tachycardia occurred after nitroglycerin.     She was seen at the [facility] 2018.  She went there for POTS.  In , she had premature rupture of membranes and had her son prematurely at 29 weeks.  He was in the  intensive care unit for 3 months.  A after that, she began having severe anxiety.  Her autonomic testing done at [facility] showed a normal QSART-no peripheral autonomic neuropathy.  Her tilt table study showed postural tachycardia but stable blood pressure.  They thought she had postural orthostatic tachycardia syndrome and recommended exercise and consideration of beta blockers.  In addition they recommended 3-5 g of sodium a day, to 2.5 L of fluid per day and sleeping with her head raised 6-10 inches as well as compression stockings avoiding alcohol and large meals.     She is  and has one child who was born premature.  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  She has occasional alcohol.  She has one cup of coffee per day.  There is no family history of premature cardiovascular disease.     She had a hysterectomy with Dr. Mason 2018.  She had a cholecystectomy 2019 and had a postop infection.       She continues to have stressors in life; her parents and  her in-laws have been ill and she also cares full-time for her son.  Her psychiatrist prescribed hydroxyzine which has been helping. She saw the [facility] in 2022 for adrenaline surges; guanfacine was stopped and she was started on a diazepam patch which made her feel terrible.  That was stopped and she is now on guanfacine 2 mg twice daily.  Her amlodipine is now 10 mg daily.  She had bilateral meniscus repair in 2022 and has been having more leg swelling; she is using compression stockings.      Echo done 2/24/2023 shows ejection fraction 56- 60% with mild mitral and tricuspid insufficiency.       She had a nonischemic treadmill stress study done 2/24/2023, exercising on a Antony protocol for 6 minutes and 30 seconds, elevated blood pressure response to exercise.     I saw Marycarmen in the office 9/10/2024.  She was having high blood pressures.  We did do her orthostatics in the office and her blood pressure remained elevated.  I added metoprolol tartrate 25 mg twice daily.    I last saw her in the office 10/22/2024.  Blood pressures were much better on twice daily metoprolol and she had no heart racing.     Marycarmen is here for follow-up.  Her blood pressure is fairly well-controlled but she notes it has been creeping up some she has had a lot of increased stress.  Her father is ill her parents live next-door and she helps care for them.  She also is caring for her son who has autism, IBS, and school difficulties.  He has been doing well at the [employer] school.  She feels metoprolol overall has still had good control of her heart racing and palpitations.  During stress her heart rate might get up to 110 bpm and this is really an improvement.  There is no angina, shortness of breath, VICKERS, PND, presyncope/syncope, or leg swelling.      Current Outpatient Medications on File Prior to Visit   Medication Sig Dispense Refill    Advair -21 MCG/ACT inhaler Inhale 2 puffs 2 (Two) Times a Day.       azelastine (ASTELIN) 0.1 % nasal spray       buPROPion XL (WELLBUTRIN XL) 150 MG 24 hr tablet Take 1 tablet by mouth Every Morning.      Cholecalciferol (Vitamin D3) 509574 UNIT/GM powder Use.      clobetasol (TEMOVATE) 0.05 % ointment       diclofenac (VOLTAREN) 1 % gel gel Apply 4 g topically to the appropriate area as directed 4 (Four) Times a Day As Needed.      Fexofenadine HCl (ALLEGRA ALLERGY PO) Take 180 mg by mouth As Needed.      FLUoxetine (PROzac) 40 MG capsule Take 1 capsule by mouth Daily.      guanFACINE (TENEX) 2 MG tablet Take 1 tablet by mouth Daily.      levalbuterol (XOPENEX HFA) 45 MCG/ACT inhaler       lidocaine 3 % cream cream Apply 1 drop topically to the appropriate area as directed.      Melatonin 5 MG chewable tablet       metoprolol tartrate (LOPRESSOR) 25 MG tablet Take 1 tablet by mouth 2 (Two) Times a Day. 60 tablet 11    mometasone (NASONEX) 50 MCG/ACT nasal spray       montelukast (SINGULAIR) 10 MG tablet TAKE ONE TABLET BY MOUTH EVERY NIGHT AT BEDTIME 90 tablet 1    Omega-3 Fatty Acids (Omega-3 Fish Oil) 500 MG capsule       omeprazole (priLOSEC) 20 MG capsule Take 1 capsule by mouth Daily. Indications: Gastroesophageal Reflux Disease      pramipexole (MIRAPEX) 0.5 MG tablet TAKE ONE TABLET BY MOUTH ONCE NIGHTLY 90 tablet 1    pregabalin (LYRICA) 75 MG capsule Take 1 capsule by mouth Daily.      PreviDent 5000 Sensitive 1.1-5 % paste Daily.      rizatriptan (Maxalt) 10 MG tablet Take 1 tablet by mouth 1 (One) Time As Needed for Migraine. May repeat in 2 hours if needed 30 tablet 1    Semaglutide-Weight Management 0.25 MG/0.5ML solution auto-injector Inject 0.5 mL under the skin into the appropriate area as directed 1 (One) Time Per Week. OK to compound with B12 2 mL 2    vitamin B-12 (CYANOCOBALAMIN) 100 MCG tablet Take 3 tablets by mouth Daily.      [DISCONTINUED] Suvorexant 10 MG tablet Take 1 tablet by mouth Every Night.       No current facility-administered medications on file  "prior to visit.         Procedures    ECG 12 Lead    Date/Time: 2/25/2025 2:55 PM  Performed by: Alessia Lindquist APRN    Authorized by: Alessia Lindquist APRN  Comparison: compared with previous ECG from 10/22/2024  Rhythm: sinus rhythm  Rate: normal  BPM: 84  QRS axis: normal              Objective:      Vitals:    02/25/25 1403 02/25/25 1458   BP: 130/70 129/87   Pulse: 84    SpO2: 99%    Height: 154.9 cm (61\")      Body mass index is 39.68 kg/m².  Wt Readings from Last 3 Encounters:   01/28/25 95.3 kg (210 lb)   11/06/24 95.3 kg (210 lb)   10/25/24 95.7 kg (211 lb)     Constitutional:       Appearance: Overweight, otherwise healthy appearance. Not in distress.   Pulmonary:      Effort: Pulmonary effort is normal.      Breath sounds: Normal breath sounds.   Cardiovascular:      Normal rate. Regular rhythm.      Murmurs: There is no murmur.   Pulses:     Radial: 2+ bilaterally.     Dorsalis pedis: 2+ bilaterally.     Posterior tibial: 2+ bilaterally.  Edema:     Peripheral edema absent.   Skin:     General: Skin is warm and dry.   Neurological:      Mental Status: Alert and oriented to person, place and time.         Lab Review:   10/18/2024 hemoglobin A1c 5.5, total cholesterol 214,  HDL 46 , CMP ALT 40 otherwise unremarkable    Assessment:     1. POTS (postural orthostatic tachycardia syndrome)    2. Essential hypertension    3. Familial hypercholesterolemia        POTS symptoms continue to be well-managed with positional maneuvers, fluid intake, limited salt intake due to high blood pressure, and twice daily metoprolol tartrate 25 mg.  Hypertension, this is improving on metoprolol tartrate 25 mg twice daily.  Remains fairly well-controlled with this.  She has had some increased stress and notices sometimes systolic blood pressure in 130s.   Hyperlipidemia, familial, working on dietary changes and following with PCP for this.  Joint hypermobility  Lumbar back pain limiting physical activity " with steroid injection, ablation in past    Plan:   No medication changes were made  Continue monitoring blood pressures at home she will call us for elevated readings, parameters reviewed,  She will see Dr. Hardin in 6 months        Thank you for allowing me to participate in this patient's care. Please call with any questions or concerns.          Dragon dictation device was utilized in this note.

## 2025-03-30 RX ORDER — MONTELUKAST SODIUM 10 MG/1
10 TABLET ORAL
Qty: 90 TABLET | Refills: 1 | Status: SHIPPED | OUTPATIENT
Start: 2025-03-30

## 2025-05-01 ENCOUNTER — OFFICE VISIT (OUTPATIENT)
Dept: FAMILY MEDICINE CLINIC | Facility: CLINIC | Age: 40
End: 2025-05-01
Payer: MEDICARE

## 2025-05-01 VITALS
HEART RATE: 84 BPM | HEIGHT: 61 IN | SYSTOLIC BLOOD PRESSURE: 112 MMHG | OXYGEN SATURATION: 97 % | TEMPERATURE: 97.8 F | BODY MASS INDEX: 40.4 KG/M2 | DIASTOLIC BLOOD PRESSURE: 76 MMHG | WEIGHT: 214 LBS

## 2025-05-01 DIAGNOSIS — E66.812 CLASS 2 SEVERE OBESITY DUE TO EXCESS CALORIES WITH SERIOUS COMORBIDITY AND BODY MASS INDEX (BMI) OF 38.0 TO 38.9 IN ADULT: ICD-10-CM

## 2025-05-01 DIAGNOSIS — M77.02 MEDIAL EPICONDYLITIS OF BOTH ELBOWS: ICD-10-CM

## 2025-05-01 DIAGNOSIS — M77.01 MEDIAL EPICONDYLITIS OF BOTH ELBOWS: ICD-10-CM

## 2025-05-01 DIAGNOSIS — M77.12 LATERAL EPICONDYLITIS OF BOTH ELBOWS: ICD-10-CM

## 2025-05-01 DIAGNOSIS — F50.811 MODERATE BINGE-EATING DISORDER: ICD-10-CM

## 2025-05-01 DIAGNOSIS — M77.11 LATERAL EPICONDYLITIS OF BOTH ELBOWS: ICD-10-CM

## 2025-05-01 DIAGNOSIS — E66.01 CLASS 2 SEVERE OBESITY DUE TO EXCESS CALORIES WITH SERIOUS COMORBIDITY AND BODY MASS INDEX (BMI) OF 38.0 TO 38.9 IN ADULT: ICD-10-CM

## 2025-05-01 DIAGNOSIS — E24.0 CUSHING DISEASE: Primary | ICD-10-CM

## 2025-05-01 RX ORDER — NALTREXONE HYDROCHLORIDE 50 MG/1
50 TABLET, FILM COATED ORAL DAILY
Qty: 30 TABLET | Refills: 2 | Status: SHIPPED | OUTPATIENT
Start: 2025-05-01

## 2025-05-01 RX ORDER — DUPILUMAB 100 MG/.67ML
INJECTION, SOLUTION SUBCUTANEOUS
COMMUNITY

## 2025-05-01 RX ORDER — BUPROPION HYDROCHLORIDE 150 MG/1
150 TABLET ORAL
COMMUNITY
Start: 2025-03-31

## 2025-05-01 RX ORDER — LISDEXAMFETAMINE DIMESYLATE 10 MG/1
10 CAPSULE ORAL DAILY
COMMUNITY
Start: 2025-04-10 | End: 2025-05-08

## 2025-05-01 NOTE — PROGRESS NOTES
Chief Complaint  Weight Check (Pt says she feels weight loss is not going well. ) and Pain (Bilateral elbow pain and weakness. Pt wants to be checked for cushing syndrome.)    Subjective        Marycarmen Gauthier presents to [facility]  History of Present Illness      History of Present Illness  The patient is a 40-year-old female presenting for evaluation of suspected Cushing's syndrome, elbow pain, asthma, and binge eating.    Elbow Pain, bilateral, chronic problem seen by hand surgery.   She reports significant elbow pain due to hypermobility, interfering with daily activities despite injections for tennis and golf elbow. Family history includes ulnar nerve surgery in her sister. She has not undergone PRP treatment but finds some relief with lidocaine patches.  - Onset: Not specified.  - Location: Elbow.  - Character: Significant pain due to hypermobility, interfering with daily activities.  - Alleviating Factors: Injections for tennis and golf elbow, lidocaine patches.  - Severity: Significant pain.    Asthma  She has been on Dupixent for 4 months, eliminating the need for oral steroids. She uses Advair twice daily and her rescue inhaler 5-6 times. Referred to ENT for hoarseness since 08/2024; recommended speech therapy.  - Onset: Hoarseness since 08/2024.  - Duration: Dupixent for 4 months.  - Character: Hoarseness.  - Alleviating Factors: Dupixent, Advair, rescue inhaler, speech therapy.  - Timing: Uses Advair twice daily and rescue inhaler 5-6 times.    Binge Eating, chronic problem  Discussed binge eating disorder with psychiatrist, who suggested lisdislofaxine but hesitated due to POTS diagnosis. Previously on naltrexone, discontinued due to kidney stones. Currently on Wellbutrin.  - Onset: Not specified.  - Character: Binge eating disorder.  - Alleviating Factors: Wellbutrin.  - Severity: Not specified.    Suspected Cushing's Syndrome  Patient with symptoms of Cushing's  "including hirsutism, oligomenorrhea, acne female balding, progressive weight gain, central obesity facial rounding, impaired glucose tolerance, history MATEUS, back pain, proximal muscle weakness, hypertension, edema fatigue with abdominal pain.    FAMILY HISTORY  - Sister had ulnar nerve surgery    MEDICATIONS  Current: Dupixent, Advair, Wellbutrin  Past: naltrexone       Objective   Vital Signs:  /76   Pulse 84   Temp 97.8 °F (36.6 °C)   Ht 154.9 cm (61\")   Wt 97.1 kg (214 lb)   SpO2 97%   BMI 40.43 kg/m²   Estimated body mass index is 40.43 kg/m² as calculated from the following:    Height as of this encounter: 154.9 cm (61\").    Weight as of this encounter: 97.1 kg (214 lb).      Physical Exam  Vitals and nursing note reviewed.   Constitutional:       General: She is not in acute distress.     Appearance: She is well-developed.   HENT:      Head: Normocephalic.      Nose: Nose normal.   Cardiovascular:      Rate and Rhythm: Normal rate and regular rhythm.      Heart sounds: Normal heart sounds. No murmur heard.  Pulmonary:      Effort: Pulmonary effort is normal. No respiratory distress.      Breath sounds: Normal breath sounds.   Musculoskeletal:         General: Normal range of motion.   Skin:     General: Skin is warm and dry.      Findings: No rash.   Neurological:      Mental Status: She is alert and oriented to person, place, and time.   Psychiatric:         Behavior: Behavior normal.         Thought Content: Thought content normal.         Judgment: Judgment normal.            Result Review :  The following data was reviewed by: Hellen Lagunas MD on 05/01/2025:         T4, free (04/28/2025 08:40)  TSH (04/28/2025 08:40)  Lipase (04/28/2025 08:40)  Hemoglobin A1c (04/28/2025 08:40)  Lipid Panel (04/28/2025 08:40)  CBC & Differential (04/28/2025 08:40)  Comprehensive Metabolic Panel (04/28/2025 08:40)       Assessment and Plan   Diagnoses and all orders for this visit:    1. Cushing disease " (Primary)  -     Cortisol, Urine, Free 24Hr - Urine, Clean Catch  -     Cortisol, Urine, Free 24Hr - Urine, Clean Catch    2. Class 2 severe obesity due to excess calories with serious comorbidity and body mass index (BMI) of 38.0 to 38.9 in adult    3. Moderate binge-eating disorder  -     naltrexone (DEPADE) 50 MG tablet; Take 1 tablet by mouth Daily.  Dispense: 30 tablet; Refill: 2    4. Lateral epicondylitis of both elbows  -     Ambulatory Referral to Sports Medicine    5. Medial epicondylitis of both elbows  -     Ambulatory Referral to Sports Medicine      Assessment & Plan  1. Suspected Cushing's syndrome  - Possible Cushingoid syndrome secondary to frequent steroid use vs. true Cushing's syndrome  - Blood work normal  - Order 24-hour urine test, will repeat after the first test has been performed  - Refer to endocrinologist if positive    2. Elbow pain  - Significant pain and weakness, not improved with steroid injections  - Refer to Dr. Rios for PRP evaluation    3. Asthma  - Improved control with Dupixent and Advair  - Continue current regimen    4. Binge eating disorder  - Start naltrexone 50 mg, half tablet daily  - Schedule one-week lab appointment    Follow-up  - Follow-up in 07/2025 for wellness visit      Follow Up   Return if symptoms worsen or fail to improve, for needs lab apt in 1 week for another 24 hour urine.  Patient was given instructions and counseling regarding her condition or for health maintenance advice. Please see specific information pulled into the AVS if appropriate.         Hellen Lagunas MD      Patient or patient representative verbalized consent for the use of Ambient Listening during the visit with  Hellen Lagunas MD for chart documentation. 5/1/2025  08:40 EDT

## 2025-05-06 DIAGNOSIS — E24.0 CUSHING DISEASE: ICD-10-CM

## 2025-05-07 LAB
CORTIS F 24H UR-MCNC: <1 UG/L
CORTIS F 24H UR-MRATE: <1 UG/24 HR (ref 6–42)

## 2025-05-12 LAB
CORTIS F 24H UR-MCNC: 1 UG/L
CORTIS F 24H UR-MRATE: 1 UG/24 HR (ref 6–42)

## 2025-05-16 ENCOUNTER — OFFICE VISIT (OUTPATIENT)
Dept: SPORTS MEDICINE | Facility: CLINIC | Age: 40
End: 2025-05-16
Payer: MEDICARE

## 2025-05-16 VITALS
RESPIRATION RATE: 16 BRPM | DIASTOLIC BLOOD PRESSURE: 80 MMHG | BODY MASS INDEX: 40.4 KG/M2 | HEIGHT: 61 IN | WEIGHT: 214 LBS | HEART RATE: 75 BPM | OXYGEN SATURATION: 99 % | SYSTOLIC BLOOD PRESSURE: 130 MMHG

## 2025-05-16 DIAGNOSIS — M25.521 BILATERAL ELBOW JOINT PAIN: Primary | ICD-10-CM

## 2025-05-16 DIAGNOSIS — M77.02 MEDIAL EPICONDYLITIS OF BOTH ELBOWS: ICD-10-CM

## 2025-05-16 DIAGNOSIS — M77.11 LATERAL EPICONDYLITIS OF BOTH ELBOWS: ICD-10-CM

## 2025-05-16 DIAGNOSIS — M77.12 LATERAL EPICONDYLITIS OF BOTH ELBOWS: ICD-10-CM

## 2025-05-16 DIAGNOSIS — M35.7 HYPERMOBILITY SYNDROME: ICD-10-CM

## 2025-05-16 DIAGNOSIS — M25.522 BILATERAL ELBOW JOINT PAIN: Primary | ICD-10-CM

## 2025-05-16 DIAGNOSIS — M77.01 MEDIAL EPICONDYLITIS OF BOTH ELBOWS: ICD-10-CM

## 2025-05-16 NOTE — PROGRESS NOTES
Marycarmen is a 40 y.o. year old female presents to [facility]    Chief Complaint   Patient presents with    Elbow Pain     PCP referral for eval of b/l elbow pain - reports no injuries and she is hypermobile, reports having neuro sxs, more concerned with weakness in the arms - seen at [facility] with steroid injection and no relief for tennis and golfers elbow - here with new x-rays for further evaluation and treatment      Seen at request of Dr. Lagunas, new patient to practice.    History of Present Illness  History of Present Illness  The patient presents for evaluation of bilateral elbow pain.    Bilateral Elbow Pain  - Experienced for over a year  - Initially managed with cortisone injections  - Avoided overextension during Pilates  - Sustained a back injury leading to current symptoms  - Incident where unable to hold a drink prompted medical attention  - Diagnosed with golf and tennis elbow in both arms by Dr. 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  - Experiences pain in middle finger and thumb  - Underwent physical therapy for elbows, neck, and back  - Tried various treatments including wearing volleyball pads and keeping arms straight at night  - No significant relief from cortisone injections    Cushing's Syndrome  - Suspected in April due to steroid use  - Confirmed by PCP  - Currently avoiding steroids    Other Medical History  - Right rotator cuff surgery  - FHL tendon release in ankle  - Bilateral meniscus surgery    Supplemental information: She has been on Dupixent injections for the past 4 to 5 months due to constant steroid use for asthma, which she  "reports has been highly effective.    MEDICATIONS  Current: Dupixent    I have reviewed the patient's medical, family, and social history in detail and updated the computerized patient record.    /80 (BP Location: Right arm, Patient Position: Sitting, Cuff Size: Large Adult)   Pulse 75   Resp 16   Ht 154.9 cm (60.98\")   Wt 97.1 kg (214 lb)   SpO2 99%   BMI 40.46 kg/m²      Physical Exam    Vital signs reviewed.   General: No acute distress.  Eyes: conjunctiva clear; pupils equally round and reactive  ENT: external ears atraumatic  CV: no peripheral edema  Resp: normal respiratory effort, no use of accessory muscles  Skin: no rashes or wounds; normal turgor  Psych: mood and affect appropriate; recent and remote memory intact  Neuro: sensation to light touch intact    MSK Exam  Physical Exam  Both elbows have full ROM. Hypermobility with extension approximately 5 degrees beyond normal. Soft tissue tenderness along the origin of bilateral common extensor and flexor tendons at the epicondyles. No change in pain intensity with resisted wrist extension, wrist flexion, middle finger flexion, middle finger extension, resisted pronation, and supination at the wrist.    Bilateral elbow X-Ray  Indication: Pain  Views: AP and Lateral views    Findings:  No fracture  No bony lesion  Calcification noted along the right lateral epicondyle, suspect calcific tendinitis of common extensor tendon  Normal joint spaces    No prior studies were available for comparison.             Diagnoses and all orders for this visit:    1. Bilateral elbow joint pain (Primary)  -     XR Elbow 2 View Bilateral; Future  -     XR Elbow 2 View Bilateral    2. Lateral epicondylitis of both elbows    3. Medial epicondylitis of both elbows    4. Hypermobility syndrome        Assessment & Plan  1. Bilateral elbow pain: Chronic. X-ray shows calcification along the lateral epicondyle of the right elbow. Associated with Cushing's-like syndrome due " to steroid use.  - Recommend PRP injections as a non-steroidal treatment option.  - Procedure involves drawing blood, spinning it, and injecting platelets under ultrasound guidance into the tendons.  - PRP is not covered by insurance and costs $400 per injection site.  - Will receive 2 injections in each elbow.  - If PRP does not provide relief, consider prolotherapy.    2. Asthma: Stable.  - On Dupixent injections for the past 4 to 5 months due to constant steroid use for asthma, which has been highly effective.    Follow-up  - Follow up in 6 weeks post-injection to assess treatment effectiveness.    PROCEDURE  Received cortisone injections in both elbows in July and January. Carpal tunnel surgery performed by Dr. Lagunas. History of right rotator cuff surgery, FHL tendon release in ankle, and bilateral meniscus surgery.          Patient or patient representative verbalized consent for the use of Ambient Listening during the visit with  LEXUS Rios Jr.,  for chart documentation on 05/16/2025 at 09:25 EDT.

## 2025-05-28 ENCOUNTER — PROCEDURE VISIT (OUTPATIENT)
Dept: SPORTS MEDICINE | Facility: CLINIC | Age: 40
End: 2025-05-28

## 2025-05-28 VITALS
OXYGEN SATURATION: 99 % | DIASTOLIC BLOOD PRESSURE: 70 MMHG | HEIGHT: 61 IN | HEART RATE: 93 BPM | WEIGHT: 214 LBS | SYSTOLIC BLOOD PRESSURE: 128 MMHG | BODY MASS INDEX: 40.4 KG/M2 | RESPIRATION RATE: 16 BRPM

## 2025-05-28 DIAGNOSIS — M77.12 LATERAL EPICONDYLITIS OF BOTH ELBOWS: ICD-10-CM

## 2025-05-28 DIAGNOSIS — M77.02 MEDIAL EPICONDYLITIS OF BOTH ELBOWS: ICD-10-CM

## 2025-05-28 DIAGNOSIS — M77.01 MEDIAL EPICONDYLITIS OF BOTH ELBOWS: ICD-10-CM

## 2025-05-28 DIAGNOSIS — M25.521 BILATERAL ELBOW JOINT PAIN: Primary | ICD-10-CM

## 2025-05-28 DIAGNOSIS — M77.11 LATERAL EPICONDYLITIS OF BOTH ELBOWS: ICD-10-CM

## 2025-05-28 DIAGNOSIS — M25.522 BILATERAL ELBOW JOINT PAIN: Primary | ICD-10-CM

## 2025-05-28 NOTE — PROGRESS NOTES
"Ultrasound Guided Lateral Epicondylitis Injection Procedure Note    Right lateral epicondyle injection was discussed with the patient in detail, including indication, risks, benefits, and alternatives. Verbal consent was given for the procedure. Injection was performed by physician.  Injection site was identified by physical examination and cleaned with Betadine and alcohol swabs. Prior to needle insertion, ethyl chloride spray was used for surface anesthesia. Sterile technique was used.   A 25-gauge, 1.5\" needle was directed to the lateral epicondyle, common extensor tendon bundle by continuous ultrasound visualization. Injectate was passed into the space without difficulty. The needle was removed and a simple bandage was applied. The procedure was tolerated well without difficulty.    Injection mixture:  ACP PRP 2.5 mL    Ultrasound Guided Medial Epicondylitis Injection Procedure Note    Right medial  epicondyle injection was discussed with the patient in detail, including indication, risks, benefits, and alternatives. Verbal consent was given for the procedure. Injection was performed by physician.  Injection site was identified by physical examination and cleaned with Betadine and alcohol swabs. Prior to needle insertion, ethyl chloride spray was used for surface anesthesia. Sterile technique was used.   A 25-gauge, 1.5\" needle was directed to the medial epicondyle, common flexor tendon bundle by continuous ultrasound visualization. Injectate was passed into the space without difficulty. The needle was removed and a simple bandage was applied. The procedure was tolerated well without difficulty.    Injection mixture:  ACP PRP 2.5 mL    Ultrasound Guided Lateral Epicondylitis Injection Procedure Note    Left lateral epicondyle injection was discussed with the patient in detail, including indication, risks, benefits, and alternatives. Verbal consent was given for the procedure. Injection was performed by " "physician.  Injection site was identified by physical examination and cleaned with Betadine and alcohol swabs. Prior to needle insertion, ethyl chloride spray was used for surface anesthesia. Sterile technique was used.   A 25-gauge, 1.5\" needle was directed to the lateral epicondyle, common extensor tendon bundle by continuous ultrasound visualization. Injectate was passed into the space without difficulty. The needle was removed and a simple bandage was applied. The procedure was tolerated well without difficulty.    Injection mixture:  ACP PRP 2.5 mL    Ultrasound Guided Medial Epicondylitis Injection Procedure Note    Left medial  epicondyle injection was discussed with the patient in detail, including indication, risks, benefits, and alternatives. Verbal consent was given for the procedure. Injection was performed by physician.  Injection site was identified by physical examination and cleaned with Betadine and alcohol swabs. Prior to needle insertion, ethyl chloride spray was used for surface anesthesia. Sterile technique was used.   A 25-gauge, 1.5\" needle was directed to the medial epicondyle, common flexor tendon bundle by continuous ultrasound visualization. Injectate was passed into the space without difficulty. The needle was removed and a simple bandage was applied. The procedure was tolerated well without difficulty.    Injection mixture:  ACP PRP 2.5 mL  "

## 2025-06-26 RX ORDER — PRAMIPEXOLE DIHYDROCHLORIDE 0.5 MG/1
0.5 TABLET ORAL NIGHTLY
Qty: 90 TABLET | Refills: 1 | Status: SHIPPED | OUTPATIENT
Start: 2025-06-26

## 2025-07-10 ENCOUNTER — OFFICE VISIT (OUTPATIENT)
Dept: SPORTS MEDICINE | Facility: CLINIC | Age: 40
End: 2025-07-10
Payer: MEDICARE

## 2025-07-10 VITALS
WEIGHT: 214 LBS | SYSTOLIC BLOOD PRESSURE: 112 MMHG | HEIGHT: 61 IN | BODY MASS INDEX: 40.4 KG/M2 | RESPIRATION RATE: 16 BRPM | DIASTOLIC BLOOD PRESSURE: 70 MMHG | OXYGEN SATURATION: 97 % | HEART RATE: 81 BPM

## 2025-07-10 DIAGNOSIS — M77.11 LATERAL EPICONDYLITIS OF BOTH ELBOWS: ICD-10-CM

## 2025-07-10 DIAGNOSIS — M77.02 MEDIAL EPICONDYLITIS OF BOTH ELBOWS: ICD-10-CM

## 2025-07-10 DIAGNOSIS — M77.01 MEDIAL EPICONDYLITIS OF BOTH ELBOWS: ICD-10-CM

## 2025-07-10 DIAGNOSIS — M35.7 HYPERMOBILITY SYNDROME: ICD-10-CM

## 2025-07-10 DIAGNOSIS — M77.12 LATERAL EPICONDYLITIS OF BOTH ELBOWS: ICD-10-CM

## 2025-07-10 DIAGNOSIS — M25.521 BILATERAL ELBOW JOINT PAIN: Primary | ICD-10-CM

## 2025-07-10 DIAGNOSIS — M25.522 BILATERAL ELBOW JOINT PAIN: Primary | ICD-10-CM

## 2025-07-10 PROCEDURE — 3078F DIAST BP <80 MM HG: CPT | Performed by: FAMILY MEDICINE

## 2025-07-10 PROCEDURE — 99213 OFFICE O/P EST LOW 20 MIN: CPT | Performed by: FAMILY MEDICINE

## 2025-07-10 PROCEDURE — 1159F MED LIST DOCD IN RCRD: CPT | Performed by: FAMILY MEDICINE

## 2025-07-10 PROCEDURE — 1160F RVW MEDS BY RX/DR IN RCRD: CPT | Performed by: FAMILY MEDICINE

## 2025-07-10 PROCEDURE — 3074F SYST BP LT 130 MM HG: CPT | Performed by: FAMILY MEDICINE

## 2025-07-10 NOTE — PROGRESS NOTES
"Marycarmen is a 40 y.o. year old female presents to [facility]    Chief Complaint   Patient presents with    Follow-up     F/u for b/l elbows s/p ACP PRP on 05/28 - reports she is doing well but still having some pain that is tolerable - here with mom for today's visit        History of Present Illness  History of Present Illness  The patient presents for evaluation of elbow pain, accompanied by her mother.    Elbow Pain  - She reports a 90% reduction in pain but experiences severe discomfort at night if she engages in activities like laundry, dishes, or vacuuming during the day.  - Pain subsides by morning.  - Overextension of her arm can trigger pain.  - She tries to keep her arm straight while sleeping, but some nights are more painful.  - The lateral epicondyle is most affected, especially at night.    She has experienced various unusual injuries over the years.    PAST SURGICAL HISTORY:  Surgeries on ankle, rotator cuff, wrists, spine, and knees. No hip surgery.    I have reviewed the patient's medical, family, and social history in detail and updated the computerized patient record.    /70 (BP Location: Left arm, Patient Position: Sitting, Cuff Size: Large Adult)   Pulse 81   Resp 16   Ht 154.9 cm (60.98\")   Wt 97.1 kg (214 lb)   SpO2 97%   BMI 40.46 kg/m²      Physical Exam    Vital signs reviewed.   General: No acute distress.  Eyes: conjunctiva clear; pupils equally round and reactive  ENT: external ears atraumatic  CV: no peripheral edema  Resp: normal respiratory effort, no use of accessory muscles  Skin: no rashes or wounds; normal turgor  Psych: mood and affect appropriate; recent and remote memory intact  Neuro: sensation to light touch intact    MSK Exam  Physical Exam  Musculoskeletal  - Right elbow: Tenderness at lateral epicondyle  - Medial condyles of elbows: No tenderness      XR Elbow 2 View Bilateral (05/16/2025 09:15)            Diagnoses and all " orders for this visit:    1. Bilateral elbow joint pain (Primary)    2. Lateral epicondylitis of both elbows    3. Medial epicondylitis of both elbows    4. Hypermobility syndrome        Assessment & Plan  1. Lateral epicondylitis: Chronic.  - Reports 90% pain relief but still experiences night pain after overuse during daily activities. Pain most pronounced at right lateral epicondyle. Informed PRP treatment will continue to provide benefits for next 5 months.  - Discussed repeat PRP injection at right lateral epicondyle as potential future treatment if pain persists or worsens.  - Advised to avoid overexertion and be mindful of activities to prevent symptom exacerbation.    Follow-up  - Follow-ups are at the patient's discretion based on symptom recurrence or exacerbation.          Patient or patient representative verbalized consent for the use of Ambient Listening during the visit with  LEXUS Rios Jr.,  for chart documentation on 07/10/2025 at 11:31 EDT.

## 2025-07-31 ENCOUNTER — OFFICE VISIT (OUTPATIENT)
Dept: FAMILY MEDICINE CLINIC | Facility: CLINIC | Age: 40
End: 2025-07-31
Payer: MEDICARE

## 2025-07-31 VITALS
HEART RATE: 78 BPM | DIASTOLIC BLOOD PRESSURE: 80 MMHG | BODY MASS INDEX: 40.4 KG/M2 | WEIGHT: 214 LBS | OXYGEN SATURATION: 98 % | HEIGHT: 61 IN | TEMPERATURE: 98.7 F | SYSTOLIC BLOOD PRESSURE: 110 MMHG

## 2025-07-31 DIAGNOSIS — I10 ESSENTIAL HYPERTENSION: ICD-10-CM

## 2025-07-31 DIAGNOSIS — R11.0 NAUSEA: ICD-10-CM

## 2025-07-31 DIAGNOSIS — Z00.00 MEDICARE ANNUAL WELLNESS VISIT, SUBSEQUENT: Primary | ICD-10-CM

## 2025-07-31 DIAGNOSIS — R73.9 HYPERGLYCEMIA: ICD-10-CM

## 2025-07-31 PROCEDURE — G0439 PPPS, SUBSEQ VISIT: HCPCS | Performed by: FAMILY MEDICINE

## 2025-07-31 PROCEDURE — 1126F AMNT PAIN NOTED NONE PRSNT: CPT | Performed by: FAMILY MEDICINE

## 2025-07-31 PROCEDURE — 1160F RVW MEDS BY RX/DR IN RCRD: CPT | Performed by: FAMILY MEDICINE

## 2025-07-31 PROCEDURE — 3074F SYST BP LT 130 MM HG: CPT | Performed by: FAMILY MEDICINE

## 2025-07-31 PROCEDURE — 3079F DIAST BP 80-89 MM HG: CPT | Performed by: FAMILY MEDICINE

## 2025-07-31 PROCEDURE — 1159F MED LIST DOCD IN RCRD: CPT | Performed by: FAMILY MEDICINE

## 2025-07-31 NOTE — PROGRESS NOTES
Subjective   The ABCs of the Annual Wellness Visit  Medicare Wellness Visit      Marycarmen Gauthier is a 40 y.o. patient who presents for a Medicare Wellness Visit.    The following portions of the patient's history were reviewed and   updated as appropriate: allergies, current medications, past family history, past medical history, past social history, past surgical history, and problem list.    Compared to one year ago, the patient's physical   health is worse.  Compared to one year ago, the patient's mental   health is better.    Recent Hospitalizations:  She was not admitted to the hospital during the last year.     Current Medical Providers:  Patient Care Team:  Hellen Lagunas MD as PCP - General (Family Medicine)  Adam Carbajal MD as Consulting Physician (Hematology and Oncology)    Outpatient Medications Prior to Visit   Medication Sig Dispense Refill    Advair -21 MCG/ACT inhaler Inhale 2 puffs 2 (Two) Times a Day.      azelastine (ASTELIN) 0.1 % nasal spray       buPROPion XL (WELLBUTRIN XL) 150 MG 24 hr tablet Take 1 tablet by mouth.      Cholecalciferol (Vitamin D3) 740367 UNIT/GM powder Use.      clobetasol (TEMOVATE) 0.05 % ointment       diclofenac (VOLTAREN) 1 % gel gel Apply 4 g topically to the appropriate area as directed 4 (Four) Times a Day As Needed.      Dupilumab (Dupixent) 100 MG/0.67ML solution prefilled syringe Inject  under the skin into the appropriate area as directed.      Fexofenadine HCl (ALLEGRA ALLERGY PO) Take 180 mg by mouth As Needed.      FLUoxetine (PROzac) 40 MG capsule Take 1 capsule by mouth Daily.      guanFACINE (TENEX) 2 MG tablet Take 1 tablet by mouth Daily.      levalbuterol (XOPENEX HFA) 45 MCG/ACT inhaler       lidocaine 3 % cream cream Apply 1 drop topically to the appropriate area as directed.      Melatonin 5 MG chewable tablet       metoprolol tartrate (LOPRESSOR) 25 MG tablet Take 1 tablet by mouth 2 (Two) Times a Day. 60 tablet 11    mometasone  (NASONEX) 50 MCG/ACT nasal spray       montelukast (SINGULAIR) 10 MG tablet TAKE ONE TABLET BY MOUTH EVERY NIGHT AT BEDTIME 90 tablet 1    Omega-3 Fatty Acids (Omega-3 Fish Oil) 500 MG capsule       omeprazole (priLOSEC) 20 MG capsule Take 1 capsule by mouth Daily. Indications: Gastroesophageal Reflux Disease      pramipexole (MIRAPEX) 0.5 MG tablet TAKE ONE TABLET BY MOUTH ONCE NIGHTLY 90 tablet 1    pregabalin (LYRICA) 75 MG capsule Take 1 capsule by mouth Daily.      PreviDent 5000 Sensitive 1.1-5 % paste Daily.      rizatriptan (Maxalt) 10 MG tablet Take 1 tablet by mouth 1 (One) Time As Needed for Migraine. May repeat in 2 hours if needed 30 tablet 1    vitamin B-12 (CYANOCOBALAMIN) 100 MCG tablet Take 3 tablets by mouth Daily.      lisdexamfetamine dimesylate (VYVANSE) 10 MG capsule Take 1 capsule by mouth Daily      naltrexone (DEPADE) 50 MG tablet Take 1 tablet by mouth Daily. 30 tablet 2    Semaglutide-Weight Management 0.25 MG/0.5ML solution auto-injector Inject 0.5 mL under the skin into the appropriate area as directed 1 (One) Time Per Week. OK to compound with B12 (Patient not taking: Reported on 5/1/2025) 2 mL 2     No facility-administered medications prior to visit.     No opioid medication identified on active medication list. I have reviewed chart for other potential  high risk medication/s and harmful drug interactions in the elderly.      Aspirin is not on active medication list.  Aspirin use is not indicated based on review of current medical condition/s. Risk of harm outweighs potential benefits.  .    Patient Active Problem List   Diagnosis    POTS (postural orthostatic tachycardia syndrome)    Essential hypertension    Hyperlipidemia    Bilateral numbness and tingling of arms and legs    Anxiety    Carpal tunnel syndrome on both sides    Chronic frontal sinusitis    Chronic seasonal allergic rhinitis due to pollen    VICKERS (dyspnea on exertion)    Falls    Chronic fatigue    Hypermobility  "syndrome    IgA deficiency, selective    Low back pain at multiple sites    Myalgia    Nausea    Nephrolithiasis    Nystagmus, optokinetic    Osteoarthritis    PTSD (post-traumatic stress disorder)    RAD (reactive airway disease)    Small fiber neuropathy    Tachycardia    Cervical spondylosis    DDD (degenerative disc disease), cervical    Leukocytosis    Acid reflux    Disequilibrium    Disorder of autonomic nervous system    Endometriosis    Paroxysmal ventricular tachycardia    Class 2 severe obesity due to excess calories with serious comorbidity and body mass index (BMI) of 38.0 to 38.9 in adult    Hemorrhoid    Hypermobile joint syndrome of multiple sites    Snoring    Spinal stenosis    Lisbeth-Danlos syndrome    Arthralgia    Fatty liver disease, nonalcoholic    Candidiasis    Chronic interstitial cystitis    Lichen sclerosus of vulva    Elevated LFTs    HSV-1 infection    Chronic bilateral low back pain without sciatica    Sacroiliac joint dysfunction of both sides    Lumbar facet arthropathy    H/O infectious mononucleosis     Advance Care Planning Advance Directive is on file.  ACP discussion was held with the patient during this visit. Patient has an advance directive in EMR which is still valid.             Objective   Vitals:    07/31/25 0833   BP: 110/80   Pulse: 78   Temp: 98.7 °F (37.1 °C)   SpO2: 98%   Weight: 97.1 kg (214 lb)   Height: 154.9 cm (60.98\")   PainSc: 0-No pain       Estimated body mass index is 40.46 kg/m² as calculated from the following:    Height as of this encounter: 154.9 cm (60.98\").    Weight as of this encounter: 97.1 kg (214 lb).                Does the patient have evidence of cognitive impairment? No                                                                                                Health  Risk Assessment    Smoking Status:  Social History     Tobacco Use   Smoking Status Never    Passive exposure: Never   Smokeless Tobacco Never   Tobacco Comments    CAFFEINE " USE     Alcohol Consumption:  Social History     Substance and Sexual Activity   Alcohol Use Yes    Comment: 1 drink every few months socially       Fall Risk Screen  RAUL Fall Risk Assessment was completed, and patient is at LOW risk for falls.Assessment completed on:2025    Depression Screening   Little interest or pleasure in doing things? Several days   Feeling down, depressed, or hopeless? Not at all   PHQ-2 Total Score 1      Health Habits and Functional and Cognitive Screenin/29/2025     2:53 PM   Functional & Cognitive Status   Do you have difficulty preparing food and eating? Yes   Do you have difficulty bathing yourself, getting dressed or grooming yourself? Yes   Do you have difficulty using the toilet? No   Do you have difficulty moving around from place to place? No   Do you have trouble with steps or getting out of a bed or a chair? Yes   Current Diet Limited Junk Food   Dental Exam Up to date   Eye Exam Up to date   Exercise (times per week) 3 times per week   Current Exercises Include Walking   Do you need help using the phone?  No   Are you deaf or do you have serious difficulty hearing?  No   Do you need help to go to places out of walking distance? No   Do you need help shopping? Yes   Do you need help preparing meals?  Yes   Do you need help with housework?  Yes   Do you need help with laundry? Yes   Do you need help taking your medications? No   Do you need help managing money? No   Do you ever drive or ride in a car without wearing a seat belt? No   Have you felt unusual fatigue (could be tiredness), stress, anger or loneliness in the last month? Yes   Who do you live with? Spouse   If you need help, do you have trouble finding someone available to you? No   Have you been bothered in the last four weeks by sexual problems? No   Do you have difficulty concentrating, remembering or making decisions? Yes           Age-appropriate Screening Schedule:  Refer to the list below for  future screening recommendations based on patient's age, sex and/or medical conditions. Orders for these recommended tests are listed in the plan section. The patient has been provided with a written plan.    Health Maintenance List  Health Maintenance   Topic Date Due    COVID-19 Vaccine (8 - Mixed Product risk 2024-25 season) 03/11/2025    INFLUENZA VACCINE  10/01/2025    LIPID PANEL  04/28/2026    ANNUAL WELLNESS VISIT  07/31/2026    MAMMOGRAM  11/07/2026    TDAP/TD VACCINES (2 - Td or Tdap) 09/01/2033    HEPATITIS C SCREENING  Completed    Pneumococcal Vaccine 0-49  Completed                                                                                                                                                CMS Preventative Services Quick Reference  Risk Factors Identified During Encounter  Inadequate Social Support, Isolation, Loneliness, Lack of Transportation, Financial Difficulties, or Caregiver Stress: caregiver stress for son with special needs, caring for parents, and lots of personal health issues.     The above risks/problems have been discussed with the patient.  Pertinent information has been shared with the patient in the After Visit Summary.  An After Visit Summary and PPPS were made available to the patient.    Follow Up:   Next Medicare Wellness visit to be scheduled in 1 year.         Additional E&M Note during same encounter follows:  Patient has additional, significant, and separately identifiable condition(s)/problem(s) that require work above and beyond the Medicare Wellness Visit     Chief Complaint  Medicare Wellness-subsequent (Pt doing well ,no complains )    Subjective    HPI         The patient presents for a Medicare wellness visit.    Weight Gain and Exercise  She engages in a walking program, resulting in a 3-pound weight gain.  - Onset: Since starting the walking program.  - Character: 3-pound weight gain.    Sleep Disruption  Her sleep is disrupted due to her son's  "medication changes.  - Onset: Since her son's medication changes.  - Character: Disrupted sleep.    Blood Pressure and Mental Health  Reports good blood pressure control and improved mental health compared to last year.    Memory Lapses  Attributes minor memory lapses to stress and lack of sleep.  - Character: Minor memory lapses.  - Alleviating/Aggravating Factors: Stress and lack of sleep.    Medication Side Effects  Discontinued semaglutide due to nausea and experienced constipation with naltrexone despite stool softeners.  - Onset: After starting semaglutide and naltrexone.  - Character: Nausea with semaglutide; constipation with naltrexone.  - Alleviating/Aggravating Factors: Stool softeners did not alleviate constipation.    Back Pain  Has not resumed Pilates since back issues began. Back pain is manageable following PRP injections. Tried Pilates and physical therapy for back pain.  - Onset: Since back issues began.  - Character: Manageable back pain.  - Alleviating/Aggravating Factors: PRP injections, Pilates, and physical therapy.    Other Health Management  Considering COVID-19 vaccine. Seeking handicap parking permit and does not require medication refills. Resumed valacyclovir and increased Lyrica to twice daily. Finds Dupixent effective and has not needed steroids.    MEDICATIONS  Current: Wellbutrin, pramipexole, valacyclovir, Lyrica, Dupixent.  Discontinued: semaglutide, naltrexone    IMMUNIZATIONS  Up to date, including tetanus vaccine in 2023.          Objective   Vital Signs:  /80   Pulse 78   Temp 98.7 °F (37.1 °C)   Ht 154.9 cm (60.98\")   Wt 97.1 kg (214 lb)   SpO2 98%   BMI 40.46 kg/m²   Physical Exam  Vitals and nursing note reviewed.   Constitutional:       General: She is not in acute distress.     Appearance: Normal appearance. She is well-developed.   HENT:      Head: Normocephalic.      Right Ear: Tympanic membrane and ear canal normal. There is no impacted cerumen.      Left " Ear: Tympanic membrane and ear canal normal. There is no impacted cerumen.      Nose: Nose normal.   Eyes:      Conjunctiva/sclera: Conjunctivae normal.      Pupils: Pupils are equal, round, and reactive to light.   Neck:      Vascular: No carotid bruit.   Cardiovascular:      Rate and Rhythm: Normal rate and regular rhythm.      Heart sounds: Normal heart sounds. No murmur heard.  Pulmonary:      Effort: Pulmonary effort is normal. No respiratory distress.      Breath sounds: Normal breath sounds.   Abdominal:      General: Abdomen is flat. Bowel sounds are normal. There is no distension.      Tenderness: There is no abdominal tenderness.   Musculoskeletal:         General: Normal range of motion.   Skin:     General: Skin is warm and dry.      Findings: No rash.   Neurological:      Mental Status: She is alert and oriented to person, place, and time.   Psychiatric:         Behavior: Behavior normal.         Thought Content: Thought content normal.         Judgment: Judgment normal.           Lungs auscultated.                     Assessment and Plan   Diagnoses and all orders for this visit:    1. Medicare annual wellness visit, subsequent (Primary)    2. Essential hypertension  -     Comprehensive Metabolic Panel; Future  -     CBC & Differential; Future  -     Lipid Panel; Future  -     TSH; Future  -     T4, free; Future    3. Hyperglycemia  -     Hemoglobin A1c; Future    4. Nausea  -     Lipase; Future         1. Medicare wellness visit:  - Blood pressure readings within normal range  - Cushing's test negative in April 2025  - Recent lab work completed, no additional tests needed  - Current with immunizations and screenings (mammogram in November 2024, tetanus vaccine in 2023)  - Colon cancer screening due at age 43  - Advised to consider COVID-19 vaccine due to son's health  - Handicap parking permit form provided  - Lab work scheduled for January 2026    2. Depression:  - Slightly better than last year,  likely due to Wellbutrin  - Continue Wellbutrin, Prozac.  Now off Vyvanse.  - Continue follow-up with psychiatry    3. Anxiety:  - Symptoms improved with Wellbutrin  - Continue Wellbutrin    4. Insomnia:  - Poor sleep habits due to son's medication changes and hormonal fluctuations  - Recommended non-pharmacological interventions: consistent sleep schedule, reduce screen time before bed    5. Back pain:  - Tolerable after PRP injections  - No new treatment required    6. Nausea:  - Stopped semaglutide due to severe nausea  - No alternative medication considered    7. Constipation:  - Severe constipation with naltrexone, discontinued  - No new treatment required    Weight loss, challenges with medications, severe nausea with semaglutide, constipation with naltrexone.  Continue with walking and eating healthier as she has been doing         Follow Up   Return in about 6 months (around 1/31/2026), or if symptoms worsen or fail to improve, for Recheck weight loss with labs prior .  Patient was given instructions and counseling regarding her condition or for health maintenance advice. Please see specific information pulled into the AVS if appropriate.  Patient or patient representative verbalized consent for the use of Ambient Listening during the visit with  Hellen Lagunas MD for chart documentation. 7/31/2025  09:15 EDT

## 2025-08-18 ENCOUNTER — PATIENT ROUNDING (BHMG ONLY) (OUTPATIENT)
Dept: URGENT CARE | Facility: CLINIC | Age: 40
End: 2025-08-18
Payer: MEDICARE

## 2025-08-18 RX ORDER — METOPROLOL TARTRATE 25 MG/1
25 TABLET, FILM COATED ORAL 2 TIMES DAILY
Qty: 60 TABLET | Refills: 11 | Status: SHIPPED | OUTPATIENT
Start: 2025-08-18

## 2025-08-26 ENCOUNTER — OFFICE VISIT (OUTPATIENT)
Dept: CARDIOLOGY | Age: 40
End: 2025-08-26
Payer: MEDICARE

## 2025-08-26 VITALS
HEIGHT: 60 IN | BODY MASS INDEX: 42.6 KG/M2 | WEIGHT: 217 LBS | DIASTOLIC BLOOD PRESSURE: 80 MMHG | SYSTOLIC BLOOD PRESSURE: 100 MMHG | HEART RATE: 70 BPM

## 2025-08-26 DIAGNOSIS — I10 ESSENTIAL HYPERTENSION: ICD-10-CM

## 2025-08-26 DIAGNOSIS — G90.A POTS (POSTURAL ORTHOSTATIC TACHYCARDIA SYNDROME): Primary | ICD-10-CM

## 2025-08-26 DIAGNOSIS — E78.01 FAMILIAL HYPERCHOLESTEROLEMIA: ICD-10-CM

## 2025-08-26 PROCEDURE — 99214 OFFICE O/P EST MOD 30 MIN: CPT | Performed by: INTERNAL MEDICINE

## 2025-08-26 PROCEDURE — 3074F SYST BP LT 130 MM HG: CPT | Performed by: INTERNAL MEDICINE

## 2025-08-26 PROCEDURE — 3079F DIAST BP 80-89 MM HG: CPT | Performed by: INTERNAL MEDICINE

## 2025-08-26 PROCEDURE — 93000 ELECTROCARDIOGRAM COMPLETE: CPT | Performed by: INTERNAL MEDICINE

## 2025-08-26 RX ORDER — LORAZEPAM 0.5 MG/1
TABLET ORAL
COMMUNITY
Start: 2025-08-18

## 2025-08-26 RX ORDER — DUPILUMAB 300 MG/2ML
INJECTION, SOLUTION SUBCUTANEOUS
COMMUNITY

## 2025-08-26 RX ORDER — VALACYCLOVIR HYDROCHLORIDE 500 MG/1
500 TABLET, FILM COATED ORAL DAILY
COMMUNITY
Start: 2025-06-20 | End: 2026-06-20

## (undated) DEVICE — EPIDURAL TRAY: Brand: MEDLINE INDUSTRIES, INC.

## (undated) DEVICE — NDL SPINE 22G 31/2IN BLK

## (undated) DEVICE — NEEDLE, QUINCKE, 22GX5": Brand: MEDLINE

## (undated) DEVICE — Device

## (undated) DEVICE — GLV SURG TRIUMPH PF LTX 7.5 STRL

## (undated) DEVICE — PAD GRND CATHAY W/CABL DISP

## (undated) DEVICE — GLV SURG TRIUMPH PF LTX 7 STRL

## (undated) DEVICE — TOWEL,OR,DSP,ST,BLUE,STD,4/PK,20PK/CS: Brand: MEDLINE